# Patient Record
Sex: MALE | Race: WHITE | NOT HISPANIC OR LATINO | Employment: FULL TIME | URBAN - METROPOLITAN AREA
[De-identification: names, ages, dates, MRNs, and addresses within clinical notes are randomized per-mention and may not be internally consistent; named-entity substitution may affect disease eponyms.]

---

## 2017-05-30 ENCOUNTER — OFFICE VISIT (OUTPATIENT)
Dept: FAMILY MEDICINE | Facility: CLINIC | Age: 42
End: 2017-05-30
Payer: OTHER GOVERNMENT

## 2017-05-30 ENCOUNTER — LAB VISIT (OUTPATIENT)
Dept: LAB | Facility: HOSPITAL | Age: 42
End: 2017-05-30
Attending: FAMILY MEDICINE
Payer: OTHER GOVERNMENT

## 2017-05-30 VITALS
BODY MASS INDEX: 28.84 KG/M2 | DIASTOLIC BLOOD PRESSURE: 78 MMHG | WEIGHT: 217.63 LBS | HEIGHT: 73 IN | HEART RATE: 62 BPM | SYSTOLIC BLOOD PRESSURE: 122 MMHG | RESPIRATION RATE: 16 BRPM

## 2017-05-30 DIAGNOSIS — Z00.00 ROUTINE ADULT HEALTH MAINTENANCE: Primary | ICD-10-CM

## 2017-05-30 DIAGNOSIS — Z00.00 ROUTINE ADULT HEALTH MAINTENANCE: ICD-10-CM

## 2017-05-30 LAB
ALBUMIN SERPL BCP-MCNC: 3.8 G/DL
ALP SERPL-CCNC: 89 U/L
ALT SERPL W/O P-5'-P-CCNC: 30 U/L
ANION GAP SERPL CALC-SCNC: 9 MMOL/L
AST SERPL-CCNC: 28 U/L
BILIRUB SERPL-MCNC: 0.6 MG/DL
BUN SERPL-MCNC: 18 MG/DL
CALCIUM SERPL-MCNC: 9.1 MG/DL
CHLORIDE SERPL-SCNC: 104 MMOL/L
CHOLEST/HDLC SERPL: 4.2 {RATIO}
CO2 SERPL-SCNC: 26 MMOL/L
CREAT SERPL-MCNC: 1.2 MG/DL
EST. GFR  (AFRICAN AMERICAN): >60 ML/MIN/1.73 M^2
EST. GFR  (NON AFRICAN AMERICAN): >60 ML/MIN/1.73 M^2
GLUCOSE SERPL-MCNC: 89 MG/DL
HDL/CHOLESTEROL RATIO: 23.6 %
HDLC SERPL-MCNC: 174 MG/DL
HDLC SERPL-MCNC: 41 MG/DL
LDLC SERPL CALC-MCNC: 93.6 MG/DL
NONHDLC SERPL-MCNC: 133 MG/DL
POTASSIUM SERPL-SCNC: 4.2 MMOL/L
PROT SERPL-MCNC: 7.4 G/DL
SODIUM SERPL-SCNC: 139 MMOL/L
TRIGL SERPL-MCNC: 197 MG/DL

## 2017-05-30 PROCEDURE — 80053 COMPREHEN METABOLIC PANEL: CPT

## 2017-05-30 PROCEDURE — 99396 PREV VISIT EST AGE 40-64: CPT | Mod: S$PBB,,, | Performed by: FAMILY MEDICINE

## 2017-05-30 PROCEDURE — 36415 COLL VENOUS BLD VENIPUNCTURE: CPT | Mod: PO

## 2017-05-30 PROCEDURE — 80061 LIPID PANEL: CPT

## 2017-05-30 PROCEDURE — 99999 PR PBB SHADOW E&M-EST. PATIENT-LVL III: CPT | Mod: PBBFAC,,, | Performed by: FAMILY MEDICINE

## 2017-05-30 NOTE — PROGRESS NOTES
HPI  Hany Caba is a 41 y.o. male with multiple medical diagnoses as listed in the medical history and problem list that presents for Annual Exam (hm due: tetanus)  .      Not using CPAP, discussed dental device.      Here today for routine health maintenance.     PAST MEDICAL HISTORY:  Past Medical History:   Diagnosis Date    AR (allergic rhinitis)     KATELYNN (obstructive sleep apnea)     PPD positive, treated         PAST SURGICAL HISTORY:  Past Surgical History:   Procedure Laterality Date    APPENDECTOMY      SEPTORHINOPLASTY      ULNAR NERVE TRANSPOSITION         SOCIAL HISTORY:  Social History     Social History    Marital status:      Spouse name: N/A    Number of children: N/A    Years of education: N/A     Occupational History    Physician Assistant [employer]     Social History Main Topics    Smoking status: Never Smoker    Smokeless tobacco: Never Used    Alcohol use Yes      Comment: Occasional    Drug use: No    Sexual activity: Not on file     Other Topics Concern    Not on file     Social History Narrative    No narrative on file       FAMILY HISTORY:  Family History   Problem Relation Age of Onset    Diabetes Mother        ALLERGIES AND MEDICATIONS: updated and reviewed.  Review of patient's allergies indicates:  No Known Allergies  No current outpatient prescriptions on file.     No current facility-administered medications for this visit.        ROS  Review of Systems   Constitutional: Negative for appetite change and fatigue.   HENT: Negative for ear pain and hearing loss.    Eyes: Negative for visual disturbance.   Respiratory: Negative for cough, chest tightness and shortness of breath.    Cardiovascular: Negative for chest pain and palpitations.   Gastrointestinal: Negative for abdominal pain, blood in stool, constipation, diarrhea, nausea and vomiting.   Genitourinary: Negative for difficulty urinating.   Musculoskeletal: Negative for back  "pain, joint swelling, neck pain and neck stiffness.   Skin: Negative.    Neurological: Negative for weakness and headaches.   Psychiatric/Behavioral: Negative for dysphoric mood.       Physical Exam  Vitals:    05/30/17 0857   BP: 122/78   Pulse: 62   Resp: 16    Body mass index is 28.71 kg/m².  Weight: 98.7 kg (217 lb 9.5 oz)   Height: 6' 1" (185.4 cm)     Physical Exam   Constitutional: He is oriented to person, place, and time. He appears well-developed and well-nourished. No distress.   HENT:   Head: Normocephalic and atraumatic.   Right Ear: External ear normal.   Left Ear: External ear normal.   Eyes: Conjunctivae and EOM are normal. Pupils are equal, round, and reactive to light. No scleral icterus.   Neck: Normal range of motion. Neck supple. No JVD present. No thyromegaly present.   Cardiovascular: Normal rate, regular rhythm and intact distal pulses.  Exam reveals no gallop and no friction rub.    No murmur heard.  Pulmonary/Chest: Effort normal and breath sounds normal. He has no wheezes. He has no rales.   Abdominal: Soft. Bowel sounds are normal. He exhibits no distension and no mass. There is no tenderness.   Musculoskeletal: Normal range of motion. He exhibits no edema or tenderness.   Lymphadenopathy:     He has no cervical adenopathy.   Neurological: He is alert and oriented to person, place, and time. No cranial nerve deficit. Coordination normal.   Skin: Skin is warm and dry. No rash noted.   Psychiatric: He has a normal mood and affect.   Vitals reviewed.      Health Maintenance       Date Due Completion Date    TETANUS VACCINE 11/03/1993 ---    Influenza Vaccine 08/01/2017 10/28/2015 (Done)    Override on 10/28/2015: Done    Override on 9/1/2014: Done (through )    Lipid Panel 03/15/2021 3/15/2016          Assessment & Plan    Routine adult health maintenance  -     Comprehensive metabolic panel; Future; Expected date: 05/30/2017  -     Lipid panel; Future; Expected date: " 05/30/2017  - Health maintenance reviewed  - Diet and exercise education.        Return in about 1 year (around 5/30/2018).

## 2017-08-02 ENCOUNTER — PATIENT MESSAGE (OUTPATIENT)
Dept: FAMILY MEDICINE | Facility: CLINIC | Age: 42
End: 2017-08-02

## 2017-08-02 NOTE — TELEPHONE ENCOUNTER
Please advise, pt is wanting 90 day supply of glucosamine and chondroitin sent to mail order.  Med isnt in [facility].

## 2018-03-21 ENCOUNTER — OFFICE VISIT (OUTPATIENT)
Dept: FAMILY MEDICINE | Facility: CLINIC | Age: 43
End: 2018-03-21
Payer: OTHER GOVERNMENT

## 2018-03-21 ENCOUNTER — LAB VISIT (OUTPATIENT)
Dept: LAB | Facility: HOSPITAL | Age: 43
End: 2018-03-21
Attending: FAMILY MEDICINE
Payer: OTHER GOVERNMENT

## 2018-03-21 VITALS
SYSTOLIC BLOOD PRESSURE: 124 MMHG | WEIGHT: 216.25 LBS | HEART RATE: 54 BPM | DIASTOLIC BLOOD PRESSURE: 74 MMHG | HEIGHT: 73 IN | RESPIRATION RATE: 16 BRPM | BODY MASS INDEX: 28.66 KG/M2

## 2018-03-21 DIAGNOSIS — L82.1 SK (SEBORRHEIC KERATOSIS): ICD-10-CM

## 2018-03-21 DIAGNOSIS — Z00.00 ROUTINE ADULT HEALTH MAINTENANCE: ICD-10-CM

## 2018-03-21 DIAGNOSIS — I49.3 PVC (PREMATURE VENTRICULAR CONTRACTION): ICD-10-CM

## 2018-03-21 DIAGNOSIS — L91.8 SKIN TAG: ICD-10-CM

## 2018-03-21 DIAGNOSIS — Z00.00 ROUTINE ADULT HEALTH MAINTENANCE: Primary | ICD-10-CM

## 2018-03-21 LAB
ALBUMIN SERPL BCP-MCNC: 4.1 G/DL
ALP SERPL-CCNC: 82 U/L
ALT SERPL W/O P-5'-P-CCNC: 46 U/L
ANION GAP SERPL CALC-SCNC: 6 MMOL/L
AST SERPL-CCNC: 32 U/L
BILIRUB SERPL-MCNC: 0.6 MG/DL
BUN SERPL-MCNC: 18 MG/DL
CALCIUM SERPL-MCNC: 9.8 MG/DL
CHLORIDE SERPL-SCNC: 105 MMOL/L
CO2 SERPL-SCNC: 28 MMOL/L
CREAT SERPL-MCNC: 1.2 MG/DL
EST. GFR  (AFRICAN AMERICAN): >60 ML/MIN/1.73 M^2
EST. GFR  (NON AFRICAN AMERICAN): >60 ML/MIN/1.73 M^2
GLUCOSE SERPL-MCNC: 92 MG/DL
POTASSIUM SERPL-SCNC: 4.5 MMOL/L
PROT SERPL-MCNC: 7.6 G/DL
SODIUM SERPL-SCNC: 139 MMOL/L

## 2018-03-21 PROCEDURE — 99999 PR PBB SHADOW E&M-EST. PATIENT-LVL III: CPT | Mod: PBBFAC,,, | Performed by: FAMILY MEDICINE

## 2018-03-21 PROCEDURE — 36415 COLL VENOUS BLD VENIPUNCTURE: CPT | Mod: PO

## 2018-03-21 PROCEDURE — 99396 PREV VISIT EST AGE 40-64: CPT | Mod: 25,S$PBB,, | Performed by: FAMILY MEDICINE

## 2018-03-21 PROCEDURE — 17003 DESTRUCT PREMALG LES 2-14: CPT | Mod: PBBFAC,PO | Performed by: FAMILY MEDICINE

## 2018-03-21 PROCEDURE — 17003 DESTRUCT PREMALG LES 2-14: CPT | Mod: S$PBB,,, | Performed by: FAMILY MEDICINE

## 2018-03-21 PROCEDURE — 80053 COMPREHEN METABOLIC PANEL: CPT

## 2018-03-21 PROCEDURE — 17000 DESTRUCT PREMALG LESION: CPT | Mod: S$PBB,,, | Performed by: FAMILY MEDICINE

## 2018-03-21 PROCEDURE — 99213 OFFICE O/P EST LOW 20 MIN: CPT | Mod: PBBFAC,PO | Performed by: FAMILY MEDICINE

## 2018-03-21 PROCEDURE — 17000 DESTRUCT PREMALG LESION: CPT | Mod: PBBFAC,PO | Performed by: FAMILY MEDICINE

## 2018-03-21 NOTE — PROGRESS NOTES
HPI  Hany Caba is a 42 y.o. male with multiple medical diagnoses as listed in the medical history and problem list that presents for Annual Exam (abnormal ekg; hm due: tetanus)  .      HPI  Here today for routine health maintenance.  Recent ECG showed PVCs.  Has pending shoulder surgery for rotator cuff injury  Has recent MRI showing right knee meniscal tear.    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  Review of patient's allergies indicates:  No Known Allergies  No current outpatient prescriptions on file.     No current facility-administered medications for this visit.        ROS  Review of Systems   Constitutional: Negative for activity change and unexpected weight change.   HENT: Negative for hearing loss, rhinorrhea and trouble swallowing.    Eyes: Negative for discharge and visual disturbance.   Respiratory: Negative for chest tightness and wheezing.    Cardiovascular: Negative for chest pain and palpitations.   Gastrointestinal: Negative for  "blood in stool, constipation, diarrhea and vomiting.   Endocrine: Negative for polydipsia and polyuria.   Genitourinary: Negative for difficulty urinating, hematuria and urgency.   Musculoskeletal: Negative for arthralgias, joint swelling and neck pain.   Neurological: Negative for weakness and headaches.   Psychiatric/Behavioral: Negative for confusion and dysphoric mood.       Physical Exam  Vitals:    03/21/18 0848   BP: 124/74   Pulse: (!) 54   Resp: 16    Body mass index is 28.53 kg/m².  Weight: 98.1 kg (216 lb 4.3 oz)   Height: 6' 1" (185.4 cm)     Physical Exam   Constitutional: He is oriented to person, place, and time. He appears well-developed and well-nourished. No distress.   HENT:   Head: Normocephalic and atraumatic.   Right Ear: External ear normal.   Left Ear: External ear normal.   Eyes: Conjunctivae and EOM are normal. Pupils are equal, round, and reactive to light. No scleral icterus.   Neck: Normal range of motion. Neck supple. No JVD present. No thyromegaly present.   Cardiovascular: Normal rate, regular rhythm and intact distal pulses.  Exam reveals no gallop and no friction rub.    No murmur heard.  Pulmonary/Chest: Effort normal and breath sounds normal. He has no wheezes. He has no rales.   Abdominal: Soft. Bowel sounds are normal. He exhibits no distension and no mass. There is no tenderness.   Musculoskeletal: Normal range of motion. He exhibits no edema or tenderness.   Lymphadenopathy:     He has no cervical adenopathy.   Neurological: He is alert and oriented to person, place, and time. No cranial nerve deficit. Coordination normal.   Skin: Skin is warm and dry. No rash noted.   Seborrheic keratosis on the left neck and right thigh  Skin tag on the right eyelid   Psychiatric: He has a normal mood and affect.   Vitals reviewed.      Health Maintenance       Date Due Completion Date    TETANUS VACCINE 11/03/1993 ---    Influenza Vaccine 08/01/2017 10/28/2015 (Done)    Override on " 10/28/2015: Done    Override on 9/1/2014: Done (through )    Lipid Panel 05/30/2022 5/30/2017          Assessment & Plan    Routine adult health maintenance  -     Comprehensive metabolic panel; Future; Expected date: 03/21/2018  - Health maintenance reviewed  - Diet and exercise education.    PVC (premature ventricular contraction)  -     Holter monitor - 48 hour; Future    SK (seborrheic keratosis)  - Cryotherapy    Skin tag  -     Ambulatory referral to Ophthalmology          Follow-up in about 1 year (around 3/21/2019).

## 2018-04-12 ENCOUNTER — CLINICAL SUPPORT (OUTPATIENT)
Dept: CARDIOLOGY | Facility: CLINIC | Age: 43
End: 2018-04-12
Attending: FAMILY MEDICINE
Payer: OTHER GOVERNMENT

## 2018-04-12 DIAGNOSIS — I49.3 PVC (PREMATURE VENTRICULAR CONTRACTION): ICD-10-CM

## 2018-04-12 PROCEDURE — 93226 XTRNL ECG REC<48 HR SCAN A/R: CPT | Mod: PBBFAC,PO | Performed by: INTERNAL MEDICINE

## 2018-04-12 PROCEDURE — 93227 XTRNL ECG REC<48 HR R&I: CPT | Mod: S$PBB,,, | Performed by: INTERNAL MEDICINE

## 2018-05-10 ENCOUNTER — INITIAL CONSULT (OUTPATIENT)
Dept: OPHTHALMOLOGY | Facility: CLINIC | Age: 43
End: 2018-05-10
Payer: OTHER GOVERNMENT

## 2018-05-10 DIAGNOSIS — D23.10 PAPILLOMA OF EYELID, RIGHT: Primary | ICD-10-CM

## 2018-05-10 PROCEDURE — 99999 PR PBB SHADOW E&M-EST. PATIENT-LVL II: CPT | Mod: PBBFAC,,, | Performed by: OPHTHALMOLOGY

## 2018-05-10 PROCEDURE — 67840 REMOVE EYELID LESION: CPT | Mod: E3,S$PBB,, | Performed by: OPHTHALMOLOGY

## 2018-05-10 PROCEDURE — 92002 INTRM OPH EXAM NEW PATIENT: CPT | Mod: S$PBB,,, | Performed by: OPHTHALMOLOGY

## 2018-05-10 PROCEDURE — 99212 OFFICE O/P EST SF 10 MIN: CPT | Mod: PBBFAC,PO | Performed by: OPHTHALMOLOGY

## 2018-05-10 PROCEDURE — 67840 REMOVE EYELID LESION: CPT | Mod: E3,PBBFAC,PO | Performed by: OPHTHALMOLOGY

## 2018-05-10 RX ORDER — ERYTHROMYCIN 5 MG/G
OINTMENT OPHTHALMIC
Qty: 3.5 G | Refills: 0 | Status: SHIPPED | OUTPATIENT
Start: 2018-05-10 | End: 2018-06-26

## 2018-06-01 ENCOUNTER — PATIENT MESSAGE (OUTPATIENT)
Dept: FAMILY MEDICINE | Facility: CLINIC | Age: 43
End: 2018-06-01

## 2018-06-01 DIAGNOSIS — I49.3 PVC (PREMATURE VENTRICULAR CONTRACTION): Primary | ICD-10-CM

## 2018-06-06 ENCOUNTER — PATIENT MESSAGE (OUTPATIENT)
Dept: FAMILY MEDICINE | Facility: CLINIC | Age: 43
End: 2018-06-06

## 2018-09-13 ENCOUNTER — OFFICE VISIT (OUTPATIENT)
Dept: DERMATOLOGY | Facility: CLINIC | Age: 43
End: 2018-09-13
Payer: OTHER GOVERNMENT

## 2018-09-13 VITALS — WEIGHT: 211 LBS | BODY MASS INDEX: 27.96 KG/M2 | HEIGHT: 73 IN

## 2018-09-13 DIAGNOSIS — L81.4 LENTIGINES: ICD-10-CM

## 2018-09-13 DIAGNOSIS — L91.0 HYPERTROPHIC SCAR: ICD-10-CM

## 2018-09-13 DIAGNOSIS — L82.1 SEBORRHEIC KERATOSES: Primary | ICD-10-CM

## 2018-09-13 DIAGNOSIS — D22.9 MULTIPLE BENIGN NEVI: ICD-10-CM

## 2018-09-13 DIAGNOSIS — D18.00 ANGIOMA: ICD-10-CM

## 2018-09-13 PROCEDURE — 99212 OFFICE O/P EST SF 10 MIN: CPT | Mod: PBBFAC,PO | Performed by: DERMATOLOGY

## 2018-09-13 PROCEDURE — 99203 OFFICE O/P NEW LOW 30 MIN: CPT | Mod: S$PBB,,, | Performed by: DERMATOLOGY

## 2018-09-13 PROCEDURE — 99999 PR PBB SHADOW E&M-EST. PATIENT-LVL II: CPT | Mod: PBBFAC,,, | Performed by: DERMATOLOGY

## 2018-09-13 NOTE — PROGRESS NOTES
Subjective:       Patient ID:  Hany Caba is a 42 y.o. male who presents for   Chief Complaint   Patient presents with    Skin Check    Lesion     43 yo M presents for skin check.  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; works in ER        Review of Systems   Skin: Positive for activity-related sunscreen use and tendency to form keloidal scars (L shoulder surgery scar). Negative for daily sunscreen use and recent sunburn.   Hematologic/Lymphatic: Does not bruise/bleed easily.        Objective:    Physical Exam   Constitutional: He appears well-developed and well-nourished.   HENT:   Mouth/Throat: Lips normal.    Eyes: Lids are normal.    Neurological: He is alert and oriented to person, place, and time.   Psychiatric: He has a normal mood and affect.   Skin:   Areas Examined (abnormalities noted in diagram):   Scalp / Hair Palpated and Inspected  Head / Face Inspection Performed  Neck Inspection Performed  Chest / Axilla Inspection Performed  Abdomen Inspection Performed  Back Inspection Performed  RUE Inspected  LUE Inspection Performed  RLE Inspected  LLE Inspection Performed                   Diagram Legend     Erythematous scaling macule/papule c/w actinic keratosis       Vascular papule c/w angioma      Pigmented verrucoid papule/plaque c/w seborrheic keratosis      Yellow umbilicated papule c/w sebaceous hyperplasia      Irregularly shaped tan macule c/w lentigo     1-2 mm smooth white papules consistent with Milia      Movable subcutaneous cyst with punctum c/w epidermal inclusion cyst      Subcutaneous movable cyst c/w pilar cyst      Firm pink to brown papule c/w dermatofibroma      Pedunculated fleshy papule(s) c/w skin tag(s)      Evenly pigmented macule c/w junctional nevus     Mildly variegated pigmented, slightly  irregular-bordered macule c/w mildly atypical nevus      Flesh colored to evenly pigmented papule c/w intradermal nevus       Pink pearly papule/plaque c/w basal cell carcinoma      Erythematous hyperkeratotic cursted plaque c/w SCC      Surgical scar with no sign of skin cancer recurrence      Open and closed comedones      Inflammatory papules and pustules      Verrucoid papule consistent consistent with wart     Erythematous eczematous patches and plaques     Dystrophic onycholytic nail with subungual debris c/w onychomycosis     Umbilicated papule    Erythematous-base heme-crusted tan verrucoid plaque consistent with inflamed seborrheic keratosis     Erythematous Silvery Scaling Plaque c/w Psoriasis     See annotation      Assessment / Plan:        Seborrheic keratoses  These are benign inherited growths without a malignant potential. Reassurance given to patient. No treatment is necessary.   Discussed LN    Angioma  This is a benign vascular lesion. Reassurance given. No treatment required.     Lentigines  These are benign hyperpigmented sun induced lesions. Daily sun protection will reduce the number of new lesions    Multiple benign nevi  Reassurance that his nevi appear benign with regular and consistent pigment pattern on dermoscopy    Hypertrophic scar  Asymptomatic.  Declined ILK           Follow-up in about 1 year (around 9/13/2019).

## 2019-05-15 ENCOUNTER — PATIENT MESSAGE (OUTPATIENT)
Dept: FAMILY MEDICINE | Facility: CLINIC | Age: 44
End: 2019-05-15

## 2019-05-15 DIAGNOSIS — E78.5 HYPERLIPIDEMIA, UNSPECIFIED HYPERLIPIDEMIA TYPE: Primary | ICD-10-CM

## 2019-05-16 ENCOUNTER — LAB VISIT (OUTPATIENT)
Dept: LAB | Facility: HOSPITAL | Age: 44
End: 2019-05-16
Attending: FAMILY MEDICINE
Payer: OTHER GOVERNMENT

## 2019-05-16 DIAGNOSIS — E78.5 HYPERLIPIDEMIA, UNSPECIFIED HYPERLIPIDEMIA TYPE: ICD-10-CM

## 2019-05-16 LAB
ALBUMIN SERPL BCP-MCNC: 4 G/DL (ref 3.5–5.2)
ALP SERPL-CCNC: 101 U/L (ref 55–135)
ALT SERPL W/O P-5'-P-CCNC: 30 U/L (ref 10–44)
ANION GAP SERPL CALC-SCNC: 10 MMOL/L (ref 8–16)
AST SERPL-CCNC: 21 U/L (ref 10–40)
BILIRUB SERPL-MCNC: 0.3 MG/DL (ref 0.1–1)
BUN SERPL-MCNC: 22 MG/DL (ref 6–20)
CALCIUM SERPL-MCNC: 10 MG/DL (ref 8.7–10.5)
CHLORIDE SERPL-SCNC: 103 MMOL/L (ref 95–110)
CHOLEST SERPL-MCNC: 210 MG/DL (ref 120–199)
CHOLEST/HDLC SERPL: 3.7 {RATIO} (ref 2–5)
CO2 SERPL-SCNC: 24 MMOL/L (ref 23–29)
CREAT SERPL-MCNC: 1.1 MG/DL (ref 0.5–1.4)
EST. GFR  (AFRICAN AMERICAN): >60 ML/MIN/1.73 M^2
EST. GFR  (NON AFRICAN AMERICAN): >60 ML/MIN/1.73 M^2
GLUCOSE SERPL-MCNC: 117 MG/DL (ref 70–110)
HDLC SERPL-MCNC: 57 MG/DL (ref 40–75)
HDLC SERPL: 27.1 % (ref 20–50)
LDLC SERPL CALC-MCNC: 109.6 MG/DL (ref 63–159)
NONHDLC SERPL-MCNC: 153 MG/DL
POTASSIUM SERPL-SCNC: 4.2 MMOL/L (ref 3.5–5.1)
PROT SERPL-MCNC: 7.7 G/DL (ref 6–8.4)
SODIUM SERPL-SCNC: 137 MMOL/L (ref 136–145)
TRIGL SERPL-MCNC: 217 MG/DL (ref 30–150)

## 2019-05-16 PROCEDURE — 80053 COMPREHEN METABOLIC PANEL: CPT

## 2019-05-16 PROCEDURE — 80061 LIPID PANEL: CPT

## 2019-05-16 PROCEDURE — 36415 COLL VENOUS BLD VENIPUNCTURE: CPT | Mod: PO

## 2019-05-19 DIAGNOSIS — R73.9 HYPERGLYCEMIA: Primary | ICD-10-CM

## 2019-05-21 ENCOUNTER — LAB VISIT (OUTPATIENT)
Dept: LAB | Facility: HOSPITAL | Age: 44
End: 2019-05-21
Attending: FAMILY MEDICINE
Payer: OTHER GOVERNMENT

## 2019-05-21 DIAGNOSIS — R73.9 HYPERGLYCEMIA: ICD-10-CM

## 2019-05-21 LAB
ESTIMATED AVG GLUCOSE: 108 MG/DL (ref 68–131)
HBA1C MFR BLD HPLC: 5.4 % (ref 4–5.6)

## 2019-05-21 PROCEDURE — 83036 HEMOGLOBIN GLYCOSYLATED A1C: CPT

## 2019-05-21 PROCEDURE — 36415 COLL VENOUS BLD VENIPUNCTURE: CPT | Mod: PO

## 2019-06-24 ENCOUNTER — OFFICE VISIT (OUTPATIENT)
Dept: DERMATOLOGY | Facility: CLINIC | Age: 44
End: 2019-06-24
Payer: OTHER GOVERNMENT

## 2019-06-24 VITALS — HEIGHT: 73 IN | WEIGHT: 211 LBS | BODY MASS INDEX: 27.96 KG/M2

## 2019-06-24 DIAGNOSIS — L91.0 HYPERTROPHIC SCAR: ICD-10-CM

## 2019-06-24 DIAGNOSIS — L81.4 LENTIGINES: ICD-10-CM

## 2019-06-24 DIAGNOSIS — L82.1 SEBORRHEIC KERATOSES: Primary | ICD-10-CM

## 2019-06-24 DIAGNOSIS — D22.9 MULTIPLE BENIGN NEVI: ICD-10-CM

## 2019-06-24 PROCEDURE — 99213 OFFICE O/P EST LOW 20 MIN: CPT | Mod: S$PBB,,, | Performed by: DERMATOLOGY

## 2019-06-24 PROCEDURE — 99999 PR PBB SHADOW E&M-EST. PATIENT-LVL III: ICD-10-PCS | Mod: PBBFAC,,, | Performed by: DERMATOLOGY

## 2019-06-24 PROCEDURE — 99213 OFFICE O/P EST LOW 20 MIN: CPT | Mod: PBBFAC,PO | Performed by: DERMATOLOGY

## 2019-06-24 PROCEDURE — 99999 PR PBB SHADOW E&M-EST. PATIENT-LVL III: CPT | Mod: PBBFAC,,, | Performed by: DERMATOLOGY

## 2019-06-24 PROCEDURE — 99213 PR OFFICE/OUTPT VISIT, EST, LEVL III, 20-29 MIN: ICD-10-PCS | Mod: S$PBB,,, | Performed by: DERMATOLOGY

## 2019-06-24 NOTE — PROGRESS NOTES
Subjective:       Patient ID:  Hany Caba is a 43 y.o. male who presents for   Chief Complaint   Patient presents with    Lesion     Last OV 09/03/2018    44 y/o M present with sons for evaluation of a lesion on his back x few weeks,irritated. His wife noticed it.  No prior tx. 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  Past Medical History:   Diagnosis Date    AR (allergic rhinitis)     KATELYNN (obstructive sleep apnea)     PPD positive, treated     Skin tag     Right upper eyelid     Review of Systems   Skin: Positive for itching, activity-related sunscreen use, tendency to form keloidal scars (L shoulder surgery scar) and wears hat. Negative for dry skin, daily sunscreen use and recent sunburn.   Hematologic/Lymphatic: Does not bruise/bleed easily.        Objective:    Physical Exam   Constitutional: He appears well-developed and well-nourished.   Neurological: He is alert and oriented to person, place, and time.   Psychiatric: He has a normal mood and affect.   Skin:   Areas Examined (abnormalities noted in diagram):   Head / Face Inspection Performed  Neck Inspection Performed  Chest / Axilla Inspection Performed  Abdomen Inspection Performed  RLE Inspected              Diagram Legend     Erythematous scaling macule/papule c/w actinic keratosis       Vascular papule c/w angioma      Pigmented verrucoid papule/plaque c/w seborrheic keratosis      Yellow umbilicated papule c/w sebaceous hyperplasia      Irregularly shaped tan macule c/w lentigo     1-2 mm smooth white papules consistent with Milia      Movable subcutaneous cyst with punctum c/w epidermal inclusion cyst      Subcutaneous movable cyst c/w pilar cyst      Firm pink to brown papule c/w dermatofibroma      Pedunculated fleshy papule(s) c/w skin tag(s)      Evenly pigmented macule c/w junctional nevus     Mildly variegated pigmented, slightly  irregular-bordered macule c/w mildly atypical nevus      Flesh colored to evenly pigmented papule c/w intradermal nevus       Pink pearly papule/plaque c/w basal cell carcinoma      Erythematous hyperkeratotic cursted plaque c/w SCC      Surgical scar with no sign of skin cancer recurrence      Open and closed comedones      Inflammatory papules and pustules      Verrucoid papule consistent consistent with wart     Erythematous eczematous patches and plaques     Dystrophic onycholytic nail with subungual debris c/w onychomycosis     Umbilicated papule    Erythematous-base heme-crusted tan verrucoid plaque consistent with inflamed seborrheic keratosis     Erythematous Silvery Scaling Plaque c/w Psoriasis     See annotation      Assessment / Plan:        Seborrheic keratoses  These are benign inherited growths without a malignant potential. Reassurance given to patient. No treatment is necessary.   Discussed LN to lesions on R thigh and mid back    Multiple benign nevi  Reassurance that his nevi appear benign with regular and consistent pigment pattern on dermoscopy    Lentigines  These are benign hyperpigmented sun induced lesions. Daily sun protection will reduce the number of new lesions    Hypertrophic scar  Declined ILK at last OV           Follow up if symptoms worsen or fail to improve.

## 2019-07-24 ENCOUNTER — PATIENT MESSAGE (OUTPATIENT)
Dept: FAMILY MEDICINE | Facility: CLINIC | Age: 44
End: 2019-07-24

## 2019-09-30 ENCOUNTER — PATIENT MESSAGE (OUTPATIENT)
Dept: FAMILY MEDICINE | Facility: CLINIC | Age: 44
End: 2019-09-30

## 2019-09-30 ENCOUNTER — OFFICE VISIT (OUTPATIENT)
Dept: FAMILY MEDICINE | Facility: CLINIC | Age: 44
End: 2019-09-30
Payer: OTHER GOVERNMENT

## 2019-09-30 ENCOUNTER — HOSPITAL ENCOUNTER (OUTPATIENT)
Dept: RADIOLOGY | Facility: HOSPITAL | Age: 44
Discharge: HOME | End: 2019-09-30
Attending: FAMILY MEDICINE
Payer: OTHER GOVERNMENT

## 2019-09-30 VITALS
SYSTOLIC BLOOD PRESSURE: 98 MMHG | HEIGHT: 73 IN | HEART RATE: 70 BPM | WEIGHT: 213.38 LBS | BODY MASS INDEX: 28.28 KG/M2 | TEMPERATURE: 98 F | OXYGEN SATURATION: 98 % | DIASTOLIC BLOOD PRESSURE: 74 MMHG

## 2019-09-30 DIAGNOSIS — M25.551 PAIN OF BOTH HIP JOINTS: ICD-10-CM

## 2019-09-30 DIAGNOSIS — Z30.09 VASECTOMY EVALUATION: ICD-10-CM

## 2019-09-30 DIAGNOSIS — M25.552 PAIN OF BOTH HIP JOINTS: ICD-10-CM

## 2019-09-30 DIAGNOSIS — Z00.00 ROUTINE HEALTH MAINTENANCE: Primary | ICD-10-CM

## 2019-09-30 PROCEDURE — 99999 PR PBB SHADOW E&M-EST. PATIENT-LVL IV: CPT | Mod: PBBFAC,,, | Performed by: FAMILY MEDICINE

## 2019-09-30 PROCEDURE — 73522 X-RAY EXAM HIPS BI 3-4 VIEWS: CPT | Mod: 50,TC,FY,PO

## 2019-09-30 PROCEDURE — 99396 PR PREVENTIVE VISIT,EST,40-64: ICD-10-PCS | Mod: S$PBB,,, | Performed by: FAMILY MEDICINE

## 2019-09-30 PROCEDURE — 73522 XR HIP 3 OR 4 VIEWS BILATERAL: ICD-10-PCS | Mod: 26,,, | Performed by: RADIOLOGY

## 2019-09-30 PROCEDURE — 99396 PREV VISIT EST AGE 40-64: CPT | Mod: S$PBB,,, | Performed by: FAMILY MEDICINE

## 2019-09-30 PROCEDURE — 99214 OFFICE O/P EST MOD 30 MIN: CPT | Mod: PBBFAC,PO,25 | Performed by: FAMILY MEDICINE

## 2019-09-30 PROCEDURE — 73522 X-RAY EXAM HIPS BI 3-4 VIEWS: CPT | Mod: 26,,, | Performed by: RADIOLOGY

## 2019-09-30 PROCEDURE — 90471 IMMUNIZATION ADMIN: CPT | Mod: PBBFAC,PO

## 2019-09-30 PROCEDURE — 99999 PR PBB SHADOW E&M-EST. PATIENT-LVL IV: ICD-10-PCS | Mod: PBBFAC,,, | Performed by: FAMILY MEDICINE

## 2019-09-30 RX ORDER — PANTOPRAZOLE SODIUM 40 MG/1
40 TABLET, DELAYED RELEASE ORAL DAILY
Qty: 90 TABLET | Refills: 3 | Status: SHIPPED | OUTPATIENT
Start: 2019-09-30 | End: 2020-07-22

## 2019-09-30 NOTE — PROGRESS NOTES
HPI  Hany Caba is a 43 y.o. male with multiple medical diagnoses as listed in the medical history and problem list that presents for Annual Exam; Hip Pain (left; noticed with certain movements); and Flu Vaccine  .      HPI  Here today for routine health maintenance.    PAST MEDICAL HISTORY:  Past Medical History:   Diagnosis Date    AR (allergic rhinitis)     KATELYNN (obstructive sleep apnea)     PPD positive, treated     Skin tag     Right upper eyelid       PAST SURGICAL HISTORY:  Past Surgical History:   Procedure Laterality Date    APPENDECTOMY      ROTATOR CUFF REPAIR Right     SEPTORHINOPLASTY      ULNAR NERVE TRANSPOSITION         SOCIAL HISTORY:  Social History     Socioeconomic History    Marital status:      Spouse name: Lauryn    Number of children: 3    Years of education: 6    Highest education level: Not on file   Occupational History    Occupation: Physician Assistant     Employer: [employer]     Comment: [employer]   Social Needs    Financial resource strain: Not very hard    Food insecurity:     Worry: Never true     Inability: Never true    Transportation needs:     Medical: No     Non-medical: No   Tobacco Use    Smoking status: Never Smoker    Smokeless tobacco: Never Used   Substance and Sexual Activity    Alcohol use: Yes     Frequency: Monthly or less     Drinks per session: 1 or 2     Binge frequency: Never     Comment: Occasional    Drug use: No    Sexual activity: Not on file   Lifestyle    Physical activity:     Days per week: 5 days     Minutes per session: Not on file    Stress: To some extent   Relationships    Social connections:     Talks on phone: Twice a week     Gets together: Never     Attends Hoahaoism service: Not on file     Active member of club or organization: No     Attends meetings of clubs or organizations: Never     Relationship status:    Other Topics Concern    Not on file   Social History Narrative    Not on file  "      FAMILY HISTORY:  Family History   Problem Relation Age of Onset    Diabetes Mother     Cancer Maternal Grandmother         breast    Macular degeneration Maternal Grandmother     Amblyopia Neg Hx     Blindness Neg Hx     Cataracts Neg Hx     Glaucoma Neg Hx     Hypertension Neg Hx     Retinal detachment Neg Hx     Strabismus Neg Hx     Stroke Neg Hx     Thyroid disease Neg Hx        ALLERGIES AND MEDICATIONS: updated and reviewed.  Review of patient's allergies indicates:  No Known Allergies  Current Outpatient Medications   Medication Sig Dispense Refill    meloxicam (MOBIC) 15 MG tablet Take 15 mg by mouth once daily.       No current facility-administered medications for this visit.        ROS  Review of Systems   Constitutional: Negative for activity change and unexpected weight change.   HENT: Negative for hearing loss, rhinorrhea and trouble swallowing.    Eyes: Negative for discharge and visual disturbance.   Respiratory: Negative for chest tightness and wheezing.    Cardiovascular: Negative for chest pain and palpitations.   Gastrointestinal: Negative for blood in stool, constipation, diarrhea and vomiting.   Endocrine: Negative for polydipsia and polyuria.   Genitourinary: Negative for difficulty urinating, hematuria and urgency.   Musculoskeletal: Positive for arthralgias (hip). Negative for joint swelling and neck pain.   Neurological: Negative for weakness and headaches.   Psychiatric/Behavioral: Negative for confusion and dysphoric mood.       Physical Exam  Vitals:    09/30/19 0943   BP: 98/74   Pulse: 70   Temp: 98 °F (36.7 °C)    Body mass index is 28.16 kg/m².  Weight: 96.8 kg (213 lb 6.5 oz)   Height: 6' 1" (185.4 cm)     Physical Exam   Constitutional: He is oriented to person, place, and time. He appears well-developed and well-nourished. No distress.   HENT:   Head: Normocephalic and atraumatic.   Right Ear: External ear normal.   Left Ear: External ear normal.   Eyes: Pupils " are equal, round, and reactive to light. Conjunctivae and EOM are normal. No scleral icterus.   Neck: Normal range of motion. Neck supple. No JVD present. No thyromegaly present.   Cardiovascular: Normal rate, regular rhythm and intact distal pulses. Exam reveals no gallop and no friction rub.   No murmur heard.  Pulmonary/Chest: Effort normal and breath sounds normal. He has no wheezes. He has no rales.   Abdominal: Soft. Bowel sounds are normal. He exhibits no distension and no mass. There is no tenderness.   Musculoskeletal: Normal range of motion. He exhibits no edema or tenderness.   Lymphadenopathy:     He has no cervical adenopathy.   Neurological: He is alert and oriented to person, place, and time. No cranial nerve deficit. Coordination normal.   Skin: Skin is warm and dry. No rash noted.   Psychiatric: He has a normal mood and affect.   Vitals reviewed.    Results for orders placed or performed in visit on 05/21/19   Hemoglobin A1c   Result Value Ref Range    Hemoglobin A1C 5.4 4.0 - 5.6 %    Estimated Avg Glucose 108 68 - 131 mg/dL         Health Maintenance       Date Due Completion Date    TETANUS VACCINE 11/03/1993 ---    Influenza Vaccine (1) 09/01/2019 10/3/2018    Lipid Panel 05/16/2024 5/16/2019          Assessment & Plan    Routine health maintenance  -     Ambulatory consult to Optometry  - Health maintenance reviewed  - Diet and exercise education.    Pain of both hip joints  -     X-Ray Pelvis 3 View inc Hip 2 view Bilat; Future; Expected date: 09/30/2019    Vasectomy evaluation  -     Ambulatory consult to Urology        Follow up in about 1 year (around 9/30/2020).

## 2019-10-22 ENCOUNTER — PATIENT MESSAGE (OUTPATIENT)
Dept: FAMILY MEDICINE | Facility: CLINIC | Age: 44
End: 2019-10-22

## 2019-11-05 ENCOUNTER — PATIENT MESSAGE (OUTPATIENT)
Dept: FAMILY MEDICINE | Facility: CLINIC | Age: 44
End: 2019-11-05

## 2019-12-19 ENCOUNTER — OFFICE VISIT (OUTPATIENT)
Dept: DERMATOLOGY | Facility: CLINIC | Age: 44
End: 2019-12-19
Payer: OTHER GOVERNMENT

## 2019-12-19 VITALS — BODY MASS INDEX: 28.28 KG/M2 | HEIGHT: 73 IN | WEIGHT: 213.38 LBS | RESPIRATION RATE: 18 BRPM

## 2019-12-19 DIAGNOSIS — B07.8 COMMON WART: Primary | ICD-10-CM

## 2019-12-19 PROCEDURE — 99999 PR PBB SHADOW E&M-EST. PATIENT-LVL III: ICD-10-PCS | Mod: PBBFAC,,, | Performed by: DERMATOLOGY

## 2019-12-19 PROCEDURE — 99999 PR PBB SHADOW E&M-EST. PATIENT-LVL III: CPT | Mod: PBBFAC,,, | Performed by: DERMATOLOGY

## 2019-12-19 PROCEDURE — 17110 DESTRUCTION B9 LES UP TO 14: CPT | Mod: S$PBB,,, | Performed by: DERMATOLOGY

## 2019-12-19 PROCEDURE — 17110 PR DESTRUCTION BENIGN LESIONS UP TO 14: ICD-10-PCS | Mod: S$PBB,,, | Performed by: DERMATOLOGY

## 2019-12-19 PROCEDURE — 99213 OFFICE O/P EST LOW 20 MIN: CPT | Mod: PBBFAC,PO | Performed by: DERMATOLOGY

## 2019-12-19 PROCEDURE — 99499 UNLISTED E&M SERVICE: CPT | Mod: S$PBB,,, | Performed by: DERMATOLOGY

## 2019-12-19 PROCEDURE — 17110 DESTRUCTION B9 LES UP TO 14: CPT | Mod: PBBFAC,PO | Performed by: DERMATOLOGY

## 2019-12-19 PROCEDURE — 99499 NO LOS: ICD-10-PCS | Mod: S$PBB,,, | Performed by: DERMATOLOGY

## 2019-12-19 NOTE — PROGRESS NOTES
Subjective:       Patient ID:  Hany Caba is a 44 y.o. male who presents for   Chief Complaint   Patient presents with    Follow-up     44 y.o. M presents for a possible nail fungal infection on his L middle finger that has been present for years. Started as nail thickening in the corner and here recently started cracking and is painful.     Patient hasn't tried any OTC medications.  Patient was last seen on 06-.     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 Negative for dry skin, daily sunscreen use and recent sunburn.   Hematologic/Lymphatic: Does not bruise/bleed easily.       Past Medical History:   Diagnosis Date    AR (allergic rhinitis)     KATELYNN (obstructive sleep apnea)     PPD positive, treated     Skin tag     Right upper eyelid     Objective:    Physical Exam   Constitutional: He appears well-developed and well-nourished.   HENT:   Mouth/Throat: Lips normal.    Eyes: Lids are normal.    Neurological: He is alert and oriented to person, place, and time.   Psychiatric: He has a normal mood and affect.   Skin:   Areas Examined (abnormalities noted in diagram):   LUE Inspection Performed             Diagram Legend       Verrucoid papule consistent consistent with wart       Assessment / Plan:        Common wart    Cryosurgery procedure note:    Verbal consent from the patient is obtained. Liquid nitrogen cryosurgery is applied to 1 verruca, as detailed in the physical exam, to produce a freeze injury. 3 consecutive freeze thaw cycles are applied to each verruca. The patient is aware that blisters (possibly blood blisters) may form.         Pt will send picture and msg in 2 weeks at which time will determine which topical treatment is best    Follow up for Pt will send msg via [facility].

## 2020-01-02 ENCOUNTER — PATIENT MESSAGE (OUTPATIENT)
Dept: DERMATOLOGY | Facility: CLINIC | Age: 45
End: 2020-01-02

## 2020-01-11 ENCOUNTER — PATIENT MESSAGE (OUTPATIENT)
Dept: DERMATOLOGY | Facility: CLINIC | Age: 45
End: 2020-01-11

## 2020-02-03 ENCOUNTER — PATIENT OUTREACH (OUTPATIENT)
Dept: ADMINISTRATIVE | Facility: OTHER | Age: 45
End: 2020-02-03

## 2020-02-04 ENCOUNTER — OFFICE VISIT (OUTPATIENT)
Dept: DERMATOLOGY | Facility: CLINIC | Age: 45
End: 2020-02-04
Payer: OTHER GOVERNMENT

## 2020-02-04 VITALS — BODY MASS INDEX: 28.28 KG/M2 | RESPIRATION RATE: 18 BRPM | WEIGHT: 213.38 LBS | HEIGHT: 73 IN

## 2020-02-04 DIAGNOSIS — B07.8 COMMON WART: Primary | ICD-10-CM

## 2020-02-04 PROBLEM — I49.3 PVC (PREMATURE VENTRICULAR CONTRACTION): Status: ACTIVE | Noted: 2020-02-04

## 2020-02-04 PROCEDURE — 17110 PR DESTRUCTION BENIGN LESIONS UP TO 14: ICD-10-PCS | Mod: S$PBB,,, | Performed by: DERMATOLOGY

## 2020-02-04 PROCEDURE — 17110 DESTRUCTION B9 LES UP TO 14: CPT | Mod: PBBFAC,PO | Performed by: DERMATOLOGY

## 2020-02-04 PROCEDURE — 99999 PR PBB SHADOW E&M-EST. PATIENT-LVL III: ICD-10-PCS | Mod: PBBFAC,,, | Performed by: DERMATOLOGY

## 2020-02-04 PROCEDURE — 99499 NO LOS: ICD-10-PCS | Mod: S$PBB,,, | Performed by: DERMATOLOGY

## 2020-02-04 PROCEDURE — 99999 PR PBB SHADOW E&M-EST. PATIENT-LVL III: CPT | Mod: PBBFAC,,, | Performed by: DERMATOLOGY

## 2020-02-04 PROCEDURE — 17110 DESTRUCTION B9 LES UP TO 14: CPT | Mod: S$PBB,,, | Performed by: DERMATOLOGY

## 2020-02-04 PROCEDURE — 99499 UNLISTED E&M SERVICE: CPT | Mod: S$PBB,,, | Performed by: DERMATOLOGY

## 2020-02-04 PROCEDURE — 99213 OFFICE O/P EST LOW 20 MIN: CPT | Mod: PBBFAC,PO | Performed by: DERMATOLOGY

## 2020-02-04 NOTE — PROGRESS NOTES
Subjective:       Patient ID:  Hany Caba is a 44 y.o. male who presents for   Chief Complaint   Patient presents with    Warts     43 yo M presents for wart f/u.  He has a wart on L middle finger. Treated with cryotherapy at last visit 12/19/19.  He thinks the lesion is getting smaller with therapy.    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        Objective:    Physical Exam   Constitutional: He appears well-developed and well-nourished.   HENT:   Mouth/Throat: Lips normal.    Eyes: Lids are normal.    Neurological: He is alert and oriented to person, place, and time.   Psychiatric: He has a normal mood and affect.   Skin:   Areas Examined (abnormalities noted in diagram):   LUE Inspection Performed             Diagram Legend       Verrucoid papule consistent consistent with wart      Assessment / Plan:             Common Wart  Cryosurgery procedure note:    Verbal consent from the patient is obtained. Liquid nitrogen cryosurgery is applied to 1 verruca, as detailed in the physical exam, to produce a freeze injury. 3 consecutive freeze thaw cycles are applied to each verruca. The patient is aware that blisters (possibly blood blisters) may form    Pt will send photo

## 2020-02-05 ENCOUNTER — HOSPITAL ENCOUNTER (OUTPATIENT)
Dept: RADIOLOGY | Facility: HOSPITAL | Age: 45
Discharge: HOME | End: 2020-02-05
Attending: ORTHOPAEDIC SURGERY
Payer: OTHER GOVERNMENT

## 2020-02-05 DIAGNOSIS — M79.662 PAIN OF LEFT CALF: ICD-10-CM

## 2020-02-05 PROCEDURE — 93971 EXTREMITY STUDY: CPT | Mod: 26,LT,, | Performed by: RADIOLOGY

## 2020-02-05 PROCEDURE — 93971 EXTREMITY STUDY: CPT | Mod: TC,PO,LT

## 2020-02-05 PROCEDURE — 93971 US LOWER EXTREMITY VEINS LEFT: ICD-10-PCS | Mod: 26,LT,, | Performed by: RADIOLOGY

## 2020-02-20 ENCOUNTER — PATIENT MESSAGE (OUTPATIENT)
Dept: DERMATOLOGY | Facility: CLINIC | Age: 45
End: 2020-02-20

## 2020-02-20 NOTE — TELEPHONE ENCOUNTER
2 week F/U post last cryo L 3rd finger.     Similar thickening I have noted to my right 2nd finger as well.     We're did we leave the conversation on orals? Who knows if I spread it with the clippers over all those years.     Thoughts?          Attachments     L 3rd finger p 2nd cryo   Responsible Party

## 2020-03-24 ENCOUNTER — CLINICAL SUPPORT (OUTPATIENT)
Dept: URGENT CARE | Facility: CLINIC | Age: 45
End: 2020-03-24
Payer: OTHER GOVERNMENT

## 2020-03-24 DIAGNOSIS — R05.9 COUGH: Primary | ICD-10-CM

## 2020-03-24 PROCEDURE — U0002 COVID-19 LAB TEST NON-CDC: HCPCS

## 2020-03-25 LAB — SARS-COV-2 RNA RESP QL NAA+PROBE: NOT DETECTED

## 2020-03-31 ENCOUNTER — TELEPHONE (OUTPATIENT)
Dept: OPTOMETRY | Facility: CLINIC | Age: 45
End: 2020-03-31

## 2020-04-23 DIAGNOSIS — Z01.84 ANTIBODY RESPONSE EXAMINATION: ICD-10-CM

## 2020-04-27 ENCOUNTER — LAB VISIT (OUTPATIENT)
Dept: LAB | Facility: HOSPITAL | Age: 45
End: 2020-04-27
Attending: INTERNAL MEDICINE
Payer: OTHER GOVERNMENT

## 2020-04-27 DIAGNOSIS — Z01.84 ANTIBODY RESPONSE EXAMINATION: ICD-10-CM

## 2020-04-27 LAB — SARS-COV-2 IGG SERPL QL IA: NEGATIVE

## 2020-04-27 PROCEDURE — 86769 SARS-COV-2 COVID-19 ANTIBODY: CPT

## 2020-04-27 PROCEDURE — 36415 COLL VENOUS BLD VENIPUNCTURE: CPT

## 2020-05-13 ENCOUNTER — HOSPITAL ENCOUNTER (OUTPATIENT)
Dept: RADIOLOGY | Facility: HOSPITAL | Age: 45
Discharge: HOME | End: 2020-05-13
Attending: FAMILY MEDICINE
Payer: OTHER GOVERNMENT

## 2020-05-13 ENCOUNTER — PATIENT MESSAGE (OUTPATIENT)
Dept: FAMILY MEDICINE | Facility: CLINIC | Age: 45
End: 2020-05-13

## 2020-05-13 ENCOUNTER — OFFICE VISIT (OUTPATIENT)
Dept: FAMILY MEDICINE | Facility: CLINIC | Age: 45
End: 2020-05-13
Payer: OTHER GOVERNMENT

## 2020-05-13 VITALS
HEART RATE: 78 BPM | BODY MASS INDEX: 30.09 KG/M2 | HEIGHT: 73 IN | WEIGHT: 227.06 LBS | DIASTOLIC BLOOD PRESSURE: 70 MMHG | TEMPERATURE: 99 F | OXYGEN SATURATION: 97 % | SYSTOLIC BLOOD PRESSURE: 124 MMHG

## 2020-05-13 DIAGNOSIS — K21.9 GASTROESOPHAGEAL REFLUX DISEASE WITHOUT ESOPHAGITIS: ICD-10-CM

## 2020-05-13 DIAGNOSIS — R06.02 SHORTNESS OF BREATH: ICD-10-CM

## 2020-05-13 DIAGNOSIS — Z00.01 ENCOUNTER FOR ROUTINE ADULT HEALTH EXAMINATION WITH ABNORMAL FINDINGS: Primary | ICD-10-CM

## 2020-05-13 DIAGNOSIS — I49.8 BIGEMINY: ICD-10-CM

## 2020-05-13 DIAGNOSIS — R76.11 PPD POSITIVE, TREATED: ICD-10-CM

## 2020-05-13 DIAGNOSIS — M54.50 CHRONIC MIDLINE LOW BACK PAIN WITHOUT SCIATICA: ICD-10-CM

## 2020-05-13 DIAGNOSIS — R53.83 OTHER FATIGUE: ICD-10-CM

## 2020-05-13 DIAGNOSIS — G89.29 CHRONIC MIDLINE LOW BACK PAIN WITHOUT SCIATICA: ICD-10-CM

## 2020-05-13 DIAGNOSIS — R73.09 ABNORMAL GLUCOSE: ICD-10-CM

## 2020-05-13 PROCEDURE — 99396 PR PREVENTIVE VISIT,EST,40-64: ICD-10-PCS | Mod: S$PBB,,, | Performed by: FAMILY MEDICINE

## 2020-05-13 PROCEDURE — 72100 XR LUMBAR SPINE AP AND LATERAL: ICD-10-PCS | Mod: 26,,, | Performed by: RADIOLOGY

## 2020-05-13 PROCEDURE — 99999 PR PBB SHADOW E&M-EST. PATIENT-LVL IV: ICD-10-PCS | Mod: PBBFAC,,, | Performed by: FAMILY MEDICINE

## 2020-05-13 PROCEDURE — 71046 X-RAY EXAM CHEST 2 VIEWS: CPT | Mod: TC,FY,PO

## 2020-05-13 PROCEDURE — 71046 X-RAY EXAM CHEST 2 VIEWS: CPT | Mod: 26,,, | Performed by: RADIOLOGY

## 2020-05-13 PROCEDURE — 99396 PREV VISIT EST AGE 40-64: CPT | Mod: S$PBB,,, | Performed by: FAMILY MEDICINE

## 2020-05-13 PROCEDURE — 71046 XR CHEST PA AND LATERAL: ICD-10-PCS | Mod: 26,,, | Performed by: RADIOLOGY

## 2020-05-13 PROCEDURE — 72100 X-RAY EXAM L-S SPINE 2/3 VWS: CPT | Mod: TC,FY,PO

## 2020-05-13 PROCEDURE — 99999 PR PBB SHADOW E&M-EST. PATIENT-LVL IV: CPT | Mod: PBBFAC,,, | Performed by: FAMILY MEDICINE

## 2020-05-13 PROCEDURE — 72100 X-RAY EXAM L-S SPINE 2/3 VWS: CPT | Mod: 26,,, | Performed by: RADIOLOGY

## 2020-05-13 PROCEDURE — 99214 OFFICE O/P EST MOD 30 MIN: CPT | Mod: PBBFAC,25,PO | Performed by: FAMILY MEDICINE

## 2020-05-14 DIAGNOSIS — G89.29 CHRONIC MIDLINE LOW BACK PAIN WITHOUT SCIATICA: Primary | ICD-10-CM

## 2020-05-14 DIAGNOSIS — M54.50 CHRONIC MIDLINE LOW BACK PAIN WITHOUT SCIATICA: Primary | ICD-10-CM

## 2020-05-14 NOTE — PROGRESS NOTES
Subjective:       Patient ID: Hany Caba is a 44 y.o. male.    Chief Complaint:  Annual    HPI     The patient is coming here today to establish a new primary care physician and for an annual checkup.  Fatigued, gaining weight, also pain on the lower back without radiation for months, the symptoms are getting worse.  The patient is a PA for orthopedics.  The patient also was diagnosed with bigeminy, he was placed on verapamil secondary to this problem but the patient stated that even with the medication he is having some shortness of breath which is mild.  He had a abnormal PPD that requires x-rays, the last few x-rays that were done were normal.  The patient denies any symptoms of chest pain, but he is complaining of palpitations.    Past medical history, past social history was reviewed and discussed with the patient.    Review of Systems   Constitutional: Positive for activity change, fatigue and unexpected weight change.   HENT: Negative for hearing loss, rhinorrhea and trouble swallowing.    Eyes: Positive for visual disturbance. Negative for discharge.   Respiratory: Positive for shortness of breath. Negative for chest tightness and wheezing.    Cardiovascular: Positive for palpitations. Negative for chest pain.   Gastrointestinal: Negative for blood in stool, constipation, diarrhea and vomiting.   Endocrine: Negative for polydipsia and polyuria.   Genitourinary: Negative for difficulty urinating, hematuria and urgency.   Musculoskeletal: Positive for arthralgias and back pain. Negative for joint swelling and neck pain.   Neurological: Negative for weakness and headaches.   Psychiatric/Behavioral: Negative for confusion and dysphoric mood.       Objective:      Physical Exam   Constitutional: He appears well-developed and well-nourished. No distress.   HENT:   Head: Normocephalic and atraumatic.   Right Ear: External ear normal.   Left Ear: External ear normal.   Nose: Nose normal.   Mouth/Throat: No  oropharyngeal exudate.   Eyes: Pupils are equal, round, and reactive to light. Right eye exhibits no discharge. Left eye exhibits no discharge. No scleral icterus.   Neck: Normal range of motion. Neck supple. No thyromegaly present.   Cardiovascular: Normal rate, regular rhythm and normal heart sounds. Exam reveals no friction rub.   No murmur heard.  Pulmonary/Chest: Effort normal and breath sounds normal. No respiratory distress. He has no wheezes.   Abdominal: Soft. Bowel sounds are normal. There is no tenderness. There is no guarding.   Musculoskeletal: He exhibits tenderness (Lumbar spine). He exhibits no edema.   Neurological: No cranial nerve deficit. Coordination normal.   Skin: Skin is warm and dry. No rash noted. He is not diaphoretic. No erythema. No pallor.   Psychiatric: He has a normal mood and affect. His behavior is normal. Judgment and thought content normal.   Nursing note and vitals reviewed.      Assessment:       1. Encounter for routine adult health examination with abnormal findings    2. Bigeminy    3. Abnormal glucose    4. Other fatigue    5. Gastroesophageal reflux disease without esophagitis    6. Shortness of breath    7. Chronic midline low back pain without sciatica    8. PPD positive, treated        Plan:       Encounter for routine adult health examination with abnormal findings  -     Comprehensive metabolic panel; Future; Expected date: 05/13/2020  -     Lipid Panel; Future; Expected date: 05/13/2020  -     CBC auto differential; Future; Expected date: 05/13/2020  -     TSH; Future; Expected date: 05/13/2020  -     Hemoglobin A1C; Future; Expected date: 05/13/2020  -     Testosterone; Future; Expected date: 05/13/2020  -     HIV 1/2 Ag/Ab (4th Gen); Future; Expected date: 05/13/2020    Bigeminy:  New problem, next visit workup    Abnormal glucose:  New problem workup needed  -     Hemoglobin A1C; Future; Expected date: 05/13/2020  -     INSULIN, RANDOM; Future; Expected date:  05/13/2020    Other fatigue:  New problem workup needed  -     Comprehensive metabolic panel; Future; Expected date: 05/13/2020  -     CBC auto differential; Future; Expected date: 05/13/2020  -     TSH; Future; Expected date: 05/13/2020  -     Testosterone; Future; Expected date: 05/13/2020    Gastroesophageal reflux disease without esophagitis:  New problem, no further workup needed    Shortness of breath:  New problem workup needed  -     Brain Natriuretic Peptide; Future; Expected date: 05/13/2020    Chronic midline low back pain without sciatica:  New problem workup needed  -     X-Ray Lumbar Spine AP And Lateral; Future; Expected date: 05/13/2020    PPD positive, treated:  New problem workup needed  -     X-Ray Chest PA And Lateral; Future; Expected date: 05/13/2020    Will call the patient after we have the results of the test, healthy habits, avoid fried foods, red meat and processed starches.  The patient's BMI has been recorded in the chart. The patient has been provided educational materials regarding the benefits of attaining and maintaining a normal weight. We will continue to address and follow this issue during follow up visits.   Patient agreed with assessment and plan. Patient verbalized understanding.

## 2020-05-14 NOTE — PATIENT INSTRUCTIONS
Eating Heart-Healthy Food: Using the DASH Plan    Eating for your heart doesnt have to be hard or boring. You just need to know how to make healthier choices. The DASH eating plan has been developed to help you do just that. DASH stands for Dietary Approaches to Stop Hypertension. It is a plan that has been proven to be healthier for your heart and to lower your risk for high blood pressure. It can also help lower your risk for cancer, heart disease, osteoporosis, and diabetes.  Choosing from each food group  Choose foods from each of the food groups below each day. Try to get the recommended number of servings for each food group. The serving numbers are based on a diet of 2,000 calories a day. Talk to your doctor if youre unsure about your calorie needs. Along with getting the correct servings, the DASH plan also recommends a sodium intake less than 2,300 mg per day.        Grains  Servings: 6 to 8 a day  A serving is:  · 1 slice bread  · 1 ounce dry cereal  · Half a cup cooked rice, pasta or cereal  Best choices: Whole grains and any grains high in fiber. Vegetables  Servings: 4 to 5 a day  A serving is:  · 1 cup raw leafy vegetable  · Half a cup cut-up raw or cooked vegetable  · Half a cup vegetable juice  Best choices: Fresh or frozen vegetables prepared without added salt or fat.   Fruits  Servings: 4 to 5 a day  A serving is:  · 1 medium fruit  · One-quarter cup dried fruit  · Half a cup fresh, frozen, or canned fruit  · Half a cup of 100% fruit juices  Best choices: A variety of fresh fruits of different colors. Whole fruits are a better choice than fruit juices. Low-fat or fat-free dairy  Servings: 2 to 3 a day  A serving is:  · 1 cup milk  · 1 cup yogurt  · One and a half ounces cheese  Best choices: Skim or 1% milk, low-fat or fat-free yogurt or buttermilk, and low-fat cheeses.         Lean meats, poultry, fish  Servings: 6 or fewer a day  A serving is:  · 1 ounce cooked meats, poultry, or fish  · 1  egg  Best choices: Lean poultry and fish. Trim away visible fat. Broil, grill, roast, or boil instead of frying. Remove skin from poultry before eating. Limit how much red meat you eat.  Nuts, seeds, beans  Servings: 4 to 5 a week  A serving is:  · One-third cup nuts (one and a half ounces)  · 2 tablespoons nut butter or seeds  · Half a cup cooked dry beans or legumes  Best choices: Dry roasted nuts with no salt added, lentils, kidney beans, garbanzo beans, and whole delarosa beans.   Fats and oils  Servings: 2 to 3 a day  A serving is:  · 1 teaspoon vegetable oil  · 1 teaspoon soft margarine  · 1 tablespoon mayonnaise  · 2 tablespoons salad dressing  Best choices: Nut and vegetable oils (nontropical vegetable oils), such as olive and canola oil. Sweets  Servings: 5 a week or fewer  A serving is:  · 1 tablespoon sugar, maple syrup, or honey  · 1 tablespoon jam or jelly  · 1 half-ounce jelly beans (about 15)  · 1 cup lemonade  Best choices: Dried fruit can be a satisfying sweet. Choose low-fat sweets. And watch your serving sizes!      For more on the DASH eating plan, visit:  www.nhlbi.nih.gov/health/health-topics/topics/dash   Date Last Reviewed: 6/1/2016  © 4383-2051 [employer]. [address] [place] [address], [place] [address]. All rights reserved. This information is not intended as a substitute for professional medical care. Always follow your healthcare professional's instructions.

## 2020-05-15 ENCOUNTER — LAB VISIT (OUTPATIENT)
Dept: LAB | Facility: HOSPITAL | Age: 45
End: 2020-05-15
Attending: FAMILY MEDICINE
Payer: OTHER GOVERNMENT

## 2020-05-15 DIAGNOSIS — R06.02 SHORTNESS OF BREATH: ICD-10-CM

## 2020-05-15 DIAGNOSIS — R73.09 ABNORMAL GLUCOSE: ICD-10-CM

## 2020-05-15 DIAGNOSIS — R53.83 OTHER FATIGUE: ICD-10-CM

## 2020-05-15 DIAGNOSIS — Z00.01 ENCOUNTER FOR ROUTINE ADULT HEALTH EXAMINATION WITH ABNORMAL FINDINGS: ICD-10-CM

## 2020-05-15 LAB
ALBUMIN SERPL BCP-MCNC: 4.1 G/DL (ref 3.5–5.2)
ALP SERPL-CCNC: 89 U/L (ref 55–135)
ALT SERPL W/O P-5'-P-CCNC: 33 U/L (ref 10–44)
ANION GAP SERPL CALC-SCNC: 8 MMOL/L (ref 8–16)
AST SERPL-CCNC: 25 U/L (ref 10–40)
BASOPHILS # BLD AUTO: 0.03 K/UL (ref 0–0.2)
BASOPHILS NFR BLD: 0.4 % (ref 0–1.9)
BILIRUB SERPL-MCNC: 0.7 MG/DL (ref 0.1–1)
BNP SERPL-MCNC: <10 PG/ML (ref 0–99)
BUN SERPL-MCNC: 15 MG/DL (ref 6–20)
CALCIUM SERPL-MCNC: 9.4 MG/DL (ref 8.7–10.5)
CHLORIDE SERPL-SCNC: 105 MMOL/L (ref 95–110)
CHOLEST SERPL-MCNC: 202 MG/DL (ref 120–199)
CHOLEST/HDLC SERPL: 4.8 {RATIO} (ref 2–5)
CO2 SERPL-SCNC: 27 MMOL/L (ref 23–29)
CREAT SERPL-MCNC: 1.3 MG/DL (ref 0.5–1.4)
DIFFERENTIAL METHOD: NORMAL
EOSINOPHIL # BLD AUTO: 0.4 K/UL (ref 0–0.5)
EOSINOPHIL NFR BLD: 5.1 % (ref 0–8)
ERYTHROCYTE [DISTWIDTH] IN BLOOD BY AUTOMATED COUNT: 12.9 % (ref 11.5–14.5)
EST. GFR  (AFRICAN AMERICAN): >60 ML/MIN/1.73 M^2
EST. GFR  (NON AFRICAN AMERICAN): >60 ML/MIN/1.73 M^2
ESTIMATED AVG GLUCOSE: 105 MG/DL (ref 68–131)
GLUCOSE SERPL-MCNC: 99 MG/DL (ref 70–110)
HBA1C MFR BLD HPLC: 5.3 % (ref 4–5.6)
HCT VFR BLD AUTO: 50.9 % (ref 40–54)
HDLC SERPL-MCNC: 42 MG/DL (ref 40–75)
HDLC SERPL: 20.8 % (ref 20–50)
HGB BLD-MCNC: 16.4 G/DL (ref 14–18)
HIV 1+2 AB+HIV1 P24 AG SERPL QL IA: NEGATIVE
IMM GRANULOCYTES # BLD AUTO: 0.03 K/UL (ref 0–0.04)
IMM GRANULOCYTES NFR BLD AUTO: 0.4 % (ref 0–0.5)
INSULIN COLLECTION INTERVAL: NORMAL
INSULIN SERPL-ACNC: 15.5 UU/ML
LDLC SERPL CALC-MCNC: 100.8 MG/DL (ref 63–159)
LYMPHOCYTES # BLD AUTO: 1.6 K/UL (ref 1–4.8)
LYMPHOCYTES NFR BLD: 23.4 % (ref 18–48)
MCH RBC QN AUTO: 28.7 PG (ref 27–31)
MCHC RBC AUTO-ENTMCNC: 32.2 G/DL (ref 32–36)
MCV RBC AUTO: 89 FL (ref 82–98)
MONOCYTES # BLD AUTO: 0.6 K/UL (ref 0.3–1)
MONOCYTES NFR BLD: 9.2 % (ref 4–15)
NEUTROPHILS # BLD AUTO: 4.2 K/UL (ref 1.8–7.7)
NEUTROPHILS NFR BLD: 61.5 % (ref 38–73)
NONHDLC SERPL-MCNC: 160 MG/DL
NRBC BLD-RTO: 0 /100 WBC
PLATELET # BLD AUTO: 252 K/UL (ref 150–350)
PMV BLD AUTO: 9.7 FL (ref 9.2–12.9)
POTASSIUM SERPL-SCNC: 4.2 MMOL/L (ref 3.5–5.1)
PROT SERPL-MCNC: 7.6 G/DL (ref 6–8.4)
RBC # BLD AUTO: 5.72 M/UL (ref 4.6–6.2)
SODIUM SERPL-SCNC: 140 MMOL/L (ref 136–145)
TESTOST SERPL-MCNC: 400 NG/DL (ref 304–1227)
TRIGL SERPL-MCNC: 296 MG/DL (ref 30–150)
TSH SERPL DL<=0.005 MIU/L-ACNC: 1.14 UIU/ML (ref 0.4–4)
WBC # BLD AUTO: 6.88 K/UL (ref 3.9–12.7)

## 2020-05-15 PROCEDURE — 85025 COMPLETE CBC W/AUTO DIFF WBC: CPT

## 2020-05-15 PROCEDURE — 84403 ASSAY OF TOTAL TESTOSTERONE: CPT

## 2020-05-15 PROCEDURE — 83525 ASSAY OF INSULIN: CPT

## 2020-05-15 PROCEDURE — 86703 HIV-1/HIV-2 1 RESULT ANTBDY: CPT

## 2020-05-15 PROCEDURE — 36415 COLL VENOUS BLD VENIPUNCTURE: CPT | Mod: PO

## 2020-05-15 PROCEDURE — 83036 HEMOGLOBIN GLYCOSYLATED A1C: CPT

## 2020-05-15 PROCEDURE — 80053 COMPREHEN METABOLIC PANEL: CPT

## 2020-05-15 PROCEDURE — 84443 ASSAY THYROID STIM HORMONE: CPT

## 2020-05-15 PROCEDURE — 83880 ASSAY OF NATRIURETIC PEPTIDE: CPT

## 2020-05-15 PROCEDURE — 80061 LIPID PANEL: CPT

## 2020-05-27 ENCOUNTER — CLINICAL SUPPORT (OUTPATIENT)
Dept: REHABILITATION | Facility: HOSPITAL | Age: 45
End: 2020-05-27
Attending: FAMILY MEDICINE
Payer: OTHER GOVERNMENT

## 2020-05-27 ENCOUNTER — PATIENT MESSAGE (OUTPATIENT)
Dept: FAMILY MEDICINE | Facility: CLINIC | Age: 45
End: 2020-05-27

## 2020-05-27 DIAGNOSIS — G89.29 CHRONIC MIDLINE LOW BACK PAIN WITHOUT SCIATICA: ICD-10-CM

## 2020-05-27 DIAGNOSIS — M54.50 LUMBAR PAIN ON PALPATION: ICD-10-CM

## 2020-05-27 DIAGNOSIS — M54.50 CHRONIC MIDLINE LOW BACK PAIN WITHOUT SCIATICA: ICD-10-CM

## 2020-05-27 PROCEDURE — 97110 THERAPEUTIC EXERCISES: CPT | Mod: PO

## 2020-05-27 PROCEDURE — 97161 PT EVAL LOW COMPLEX 20 MIN: CPT | Mod: PO

## 2020-05-27 NOTE — PLAN OF CARE
[facility]  Physical Therapy Initial Evaluation    Name: Hany Caba  Clinic Number: [id number]    Therapy Diagnosis:   Encounter Diagnoses   Name Primary?    Chronic midline low back pain without sciatica     Lumbar pain on palpation      Physician: Ariella Cid MD    Physician Orders: PT Eval and Treat   Medical Diagnosis from Referral: M54.5,G89.29 (ICD-10-CM) - Chronic midline low back pain without sciatica     Evaluation Date: 5/27/2020  Authorization Period Expiration:12/31/2020  Plan of Care Expiration: 7/24/2020  Visit # / Visits authorized: 1/ 1    Time In: 1500  Time Out: 1545  Total Billable Time: 45 minutes    Precautions: Standard    Subjective   Date of onset: few weeks ago  History of current condition - Hany reports: an insidious onset of lower midline lumbar pain with pressure. He doesn't have increased pain with movement or trying to do normal daily activities but with pressure in that area it causes pain. He may have some mild pain in that area if he is on his feet for a long day in clinic. 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 He hasn't had chronic back in the past nothing consistent. He also is an [employer]. No numbness/tingling reported       Past Medical History:   Diagnosis Date    AR (allergic rhinitis)     KATELYNN (obstructive sleep apnea)     PPD positive, treated     Skin tag     Right upper eyelid     Hany Caba  has a past surgical history that includes Appendectomy; Ulnar nerve transposition; Septorhinoplasty; and Rotator cuff repair (Right).    Hany has a current medication list which includes the following prescription(s): pantoprazole and verapamil.    Review of patient's allergies indicates:  No Known Allergies     Imaging, X-Ray  FINDINGS:  Bone density is normal.  Minimal chronic anterior wedging of T12 and L1 is likely physiologic.  Otherwise, the lumbar vertebral bodies maintain  normal height and alignment.  There is no acute fracture or traumatic subluxation.  The facet joints maintain normal articulation.  The disc spaces are preserved.  The paraspinous soft tissues are normal.      Impression       1. As above      Electronically signed by: Barry Clark MD  Date: 05/13/2020  Time: 16:08     FINDINGS:  Mild hip joint space loss is noted bilaterally.  No convincing fracture or dislocation is noted.      Impression       See above      Electronically signed by: Joselito Voss MD  Date: 09/30/2019  Time: 13:10       Prior Therapy: none  Social History:  lives with their family  Occupation: PA  Prior Level of Function: no limitations  Current Level of Function: No limitations    Pain:  Current 0/10, worst 3/10, best 0/10   Location: Midline Lumbar spine   Description: Aching and Dull  Aggravating Factors: Prolonged Standing(a little) and pressure  Easing Factors: change position, relief pressure    Pts goals: get rid of pain      Objective   Mental status: oriented x3  Posture/ Alignment: Good    GAIT DEVIATIONS: Hany amb with normal gait.    ROM: thoracolumbar  ROM:   AROM  Comment   Flexion: WNL     Extension: WNL     Lat Flex R: WNL     Lat Flex L: WNL     Rotation R: WNL     Rotation L: WNL               *pain  HIP  * = pain,  amount of   OP = overpressure  Initial    Right° Left°   Flexion WNL WNL   Extension WNL WNL   Internal Rotation WNL WNL   External Rotation WNL WNL   Abduction WNL WNL          Hip flexion 5/5 bilateral  Hip abd left 4+/5, right 5/5    Functional Tests (* indicates w/ pain)   Squat w/o pain    Palpation:  + TTP L4-S1 left greater than right midline:multfidus area    Joint Play:  Normal jnt mobility    Pt/family was provided educational information, including: role of PT, goals for PT, scheduling - pt verbalized understanding. Discussed insurance plan with pt.         CMS Impairment/Limitation/Restriction for FOTO lumbar Survey    Therapist reviewed FOTO  scores for Hany Caba on 5/27/2020.   FOTO documents entered into [employer] - see Media section.    Limitation Score: 6%           TREATMENT   Treatment Time In: 1535  Treatment Time Out: 1545  Total Treatment time separate from Evaluation: 10 minutes    Hany received therapeutic exercises to develop strength and flexibility for 10 minutes including:  Supine pelvic tilts  Supine trunk rotations       Home Exercises and Patient Education Provided    Education provided:   - progression of therapy  - benefits of dry needling  - progression of exercises    Written Home Exercises Provided: Patient instructed to cont prior HEP.  Exercises were reviewed and Hany was able to demonstrate them prior to the end of the session.  Hany demonstrated good  understanding of the education provided.     See EMR under Patient Instructions for exercises provided 5/27/2020.      Assessment   Pt presents with midline LBP that is mostly brought on by pressure to that area and at times prolonged standing at work. He has some localized tenderness in the area of L4-S1 more on left vs right side that should respond well to a trial of dry needling and exercises. I started him on some basic exercises for mobility of that area today that I think will be helpful and we discussed a trial of dry needling which he is interested in so I will bring him next week to start that. He will benefit from long term healthy back program that will continue to implement over our visits .    Pt prognosis is Good.   Pt will benefit from skilled outpatient Physical Therapy to address the deficits stated above and in the chart below, provide pt/family education, and to maximize pt's level of independence.     Plan of care discussed with patient: Yes  Pt's spiritual, cultural and educational needs considered and patient is agreeable to the plan of care and goals as stated below:     Anticipated Barriers for therapy: none    Medical Necessity is demonstrated by  the following  History  Co-morbidities and personal factors that may impact the plan of care Co-morbidities:   PVC, SOB, Palpations    Personal Factors:   no deficits     low   Examination  Body Structures and Functions, activity limitations and participation restrictions that may impact the plan of care Body Regions:   back  lower extremities    Body Systems:    gross symmetry  ROM  strength  gross coordinated movement  gait  transfers    Participation Restrictions:   none    Activity limitations:   Learning and applying knowledge  no deficits    General Tasks and Commands  no deficits    Communication  no deficits    Mobility  no deficits    Self care  no deficits    Domestic Life  no deficits    Interactions/Relationships  no deficits    Life Areas  no deficits    Community and Social Life  no deficits         low   Clinical Presentation stable and uncomplicated low   Decision Making/ Complexity Score: low     Goals:  Short Term Goals: 3 weeks   Decrease pain with pressure in lumbar spine  by 50% or more  Able to stand at work/clinic w/o pain    Long Term Goals: 6 weeks   1/10 or less pain with pressure in lumbar spine  Independent with HEP        Plan   Plan of care Certification: 5/27/2020 to 7/24/2020 .    Outpatient Physical Therapy 2 times weekly for 8 weeks to include the following interventions: Manual Therapy, Neuromuscular Re-ed, Patient Education, Therapeutic Exercise and dry needling.     Nick Ji, PT

## 2020-05-28 PROBLEM — M54.50 LUMBAR PAIN ON PALPATION: Status: ACTIVE | Noted: 2020-05-28

## 2020-06-03 ENCOUNTER — CLINICAL SUPPORT (OUTPATIENT)
Dept: REHABILITATION | Facility: HOSPITAL | Age: 45
End: 2020-06-03
Attending: FAMILY MEDICINE
Payer: OTHER GOVERNMENT

## 2020-06-03 DIAGNOSIS — M54.50 LUMBAR PAIN ON PALPATION: ICD-10-CM

## 2020-06-03 PROCEDURE — 97140 MANUAL THERAPY 1/> REGIONS: CPT | Mod: PO

## 2020-06-03 NOTE — PROGRESS NOTES
Physical Therapy Daily Treatment Note     Name: Hany [facility] Number: [id number]    Therapy Diagnosis:   Encounter Diagnosis   Name Primary?    Lumbar pain on palpation      Physician: Ariella Cid MD    Visit Date: 6/3/2020   Physician Orders: PT Eval and Treat   Medical Diagnosis from Referral: M54.5,G89.29 (ICD-10-CM) - Chronic midline low back pain without sciatica      Evaluation Date: 5/27/2020  Authorization Period Expiration:12/31/2020  Plan of Care Expiration: 7/24/2020  Visit # / Visits authorized: 2/ 12     Time In: 1634  Time Out: 1705  Total Billable Time: 25 minutes     Precautions: Standard      Subjective     Pt reports: no change in symptoms still midline lumbar pain with pressure in that area  .  He was compliant with home exercise program.  Response to previous treatment: no changes  Functional change: no changes    Pain: 0/10  Location: midline back      Objective     Hany received therapeutic exercises to develop flexibility for 1 minutes including:  lexx pose stretch    Hany received the following manual therapy techniques: Soft tissue Mobilization were applied to the: lumbar spine for 25 minutes, including:  Soft tissue mobilization to multifidus /paraspinals      Soft Tissue Palpation Assessment to determine the necessity for Functional Dry Needling  .   Patient provided written and verbal consent to FDN.   FDN performed to L4-5 multfidus bilateral with electrical stimulation      Home Exercises Provided and Patient Education Provided     Education provided:   - stay moving  - may have some localized soreness    Written Home Exercises Provided: Patient instructed to cont prior HEP.  Exercises were reviewed and Hany was able to demonstrate them prior to the end of the session.  Hany demonstrated good  understanding of the education provided.     See EMR under Patient Instructions for exercises provided prior visit.    Assessment     Patient did well with treatment today  with decreased pain to pressure in the area afterwards. He will continue on HEP and then follow up next week for possible additional treatment if needed.     Hany is progressing well towards his goals.   Pt prognosis is Good.     Pt will continue to benefit from skilled outpatient physical therapy to address the deficits listed in the problem list box on initial evaluation, provide pt/family education and to maximize pt's level of independence in the home and community environment.     Pt's spiritual, cultural and educational needs considered and pt agreeable to plan of care and goals.    Anticipated barriers to physical therapy: none    Goals:  Short Term Goals: 3 weeks   Decrease pain with pressure in lumbar spine  by 50% or more, progressing, not met  Able to stand at work/clinic w/o pain, progressing, not met     Long Term Goals: 6 weeks   1/10 or less pain with pressure in lumbar spine, progressing, not met  Independent with HEP, progressing, not met           Plan   Plan of care Certification: 5/27/2020 to 7/24/2020 .     Outpatient Physical Therapy 2 times weekly for 8 weeks to include the following interventions: Manual Therapy, Neuromuscular Re-ed, Patient Education, Therapeutic Exercise and dry needling.       Nick Ji, PT

## 2020-06-12 NOTE — PROGRESS NOTES
Physical Therapy Daily Treatment Note     Name: Hany [facility] Number: [id number]    Therapy Diagnosis:   Encounter Diagnosis   Name Primary?    Lumbar pain on palpation      Physician: Ariella Cid MD    Visit Date: 6/15/2020   Physician Orders: PT Eval and Treat   Medical Diagnosis from Referral: M54.5,G89.29 (ICD-10-CM) - Chronic midline low back pain without sciatica      Evaluation Date: 5/27/2020  Authorization Period Expiration:12/31/2020  Plan of Care Expiration: 7/24/2020  Visit # / Visits authorized: 3/ 12     Time In: 1630  Time Out: 1700  Total Billable Time: 30 minutes     Precautions: Standard      Subjective     Pt reports: he is having some decrease in pressure point pain in that area from last visit.   .  He was compliant with home exercise program.  Response to previous treatment: no changes  Functional change: no changes    Pain: 0/10  Location: midline back      Objective     Hany received therapeutic exercises to develop flexibility for 0 minutes including:      Hany received the following manual therapy techniques: Soft tissue Mobilization were applied to the: lumbar spine for 25 minutes, including:  Soft tissue mobilization to lumbar multifidus /paraspinals      Soft Tissue Palpation Assessment to determine the necessity for Functional Dry Needling  .   Patient provided written and verbal consent to FDN.   FDN performed to L3-5 multfidus bilateral with electrical stimulation      Home Exercises Provided and Patient Education Provided     Education provided:   - may have some localized soreness    Written Home Exercises Provided: Patient instructed to cont prior HEP and add in prone and lateral planks  Exercises were reviewed and Hany was able to demonstrate them prior to the end of the session.  Hany demonstrated good  understanding of the education provided.     See EMR under Patient Instructions for exercises provided prior visit.    Assessment     Patient is noting some  overall improvement with his pain after last treatment but does still have some pain in that area with pressure. I extended his time with electrical stimulation today to see if that would be helpful and gave him some additional exercises. I may need to add some additional Posterior to anterior central lumbar mobilization on next visit .     Hany is progressing well towards his goals.   Pt prognosis is Good.     Pt will continue to benefit from skilled outpatient physical therapy to address the deficits listed in the problem list box on initial evaluation, provide pt/family education and to maximize pt's level of independence in the home and community environment.     Pt's spiritual, cultural and educational needs considered and pt agreeable to plan of care and goals.    Anticipated barriers to physical therapy: none    Goals:  Short Term Goals: 3 weeks   Decrease pain with pressure in lumbar spine  by 50% or more, progressing, not met  Able to stand at work/clinic w/o pain, progressing, not met     Long Term Goals: 6 weeks   1/10 or less pain with pressure in lumbar spine, progressing, not met  Independent with HEP, progressing, not met           Plan   Plan of care Certification: 5/27/2020 to 7/24/2020 .     Outpatient Physical Therapy 2 times weekly for 8 weeks to include the following interventions: Manual Therapy, Neuromuscular Re-ed, Patient Education, Therapeutic Exercise and dry needling.       Nick Ji, PT

## 2020-06-15 ENCOUNTER — CLINICAL SUPPORT (OUTPATIENT)
Dept: REHABILITATION | Facility: HOSPITAL | Age: 45
End: 2020-06-15
Attending: FAMILY MEDICINE
Payer: OTHER GOVERNMENT

## 2020-06-15 DIAGNOSIS — M54.50 LUMBAR PAIN ON PALPATION: ICD-10-CM

## 2020-06-15 PROCEDURE — 97140 MANUAL THERAPY 1/> REGIONS: CPT | Mod: PO

## 2020-06-29 ENCOUNTER — CLINICAL SUPPORT (OUTPATIENT)
Dept: REHABILITATION | Facility: HOSPITAL | Age: 45
End: 2020-06-29
Attending: FAMILY MEDICINE
Payer: OTHER GOVERNMENT

## 2020-06-29 DIAGNOSIS — M54.50 LUMBAR PAIN ON PALPATION: Primary | ICD-10-CM

## 2020-06-29 PROCEDURE — 97140 MANUAL THERAPY 1/> REGIONS: CPT | Mod: PO

## 2020-06-29 NOTE — PROGRESS NOTES
Physical Therapy Daily Treatment Note     [id number]    Therapy Diagnosis:   Encounter Diagnosis   Name Primary?    Lumbar pain on palpation Yes     Physician: Ariella Cid MD    Visit Date: 6/29/2020   Physician Orders: PT Eval and Treat   Medical Diagnosis from Referral: M54.5,G89.29 (ICD-10-CM) - Chronic midline low back pain without sciatica      Evaluation Date: 5/27/2020  Authorization Period Expiration:12/31/2020  Plan of Care Expiration: 7/24/2020  Visit # / Visits authorized: 4/ 12     Time In: 1630  Time Out: 1700  Total Billable Time: 30 minutes     Precautions: Standard      Subjective     Pt reports: his pain is better than previously but hasn't noticed a big change since last visit.   .  He was compliant with home exercise program.  Response to previous treatment: able to tolerate more pressure  Functional change:     Pain: 0/10  Location: midline back      Objective     Hany received therapeutic exercises to develop flexibility for 0 minutes including:      Hany received the following manual therapy techniques: Soft tissue Mobilization were applied to the: lumbar spine for 25 minutes, including:  Soft tissue mobilization to lumbar multifidus /paraspinals      Soft Tissue Palpation Assessment to determine the necessity for Functional Dry Needling  .   Patient provided written and verbal consent to FDN.   FDN performed to L3-5 multfidus bilateral with electrical stimulation      Home Exercises Provided and Patient Education Provided     Education provided:   - instructed in self lumbar rotational mobilization    Written Home Exercises Provided: Patient instructed to cont prior HEP and add in prone and lateral planks  Exercises were reviewed and Hany was able to demonstrate them prior to the end of the session.  Hany demonstrated good  understanding of the education provided.     See EMR under Patient Instructions for exercises provided prior  visit.    Assessment     Patient has had some benefit with treatment to date so I provided an additional exercise that may be beneficial to help mobilize his lumbar spine area which could help with pain generation in the area. He will see how he does over the next 2 weeks and then follow up with me.     Hany is progressing well towards his goals.   Pt prognosis is Good.     Pt will continue to benefit from skilled outpatient physical therapy to address the deficits listed in the problem list box on initial evaluation, provide pt/family education and to maximize pt's level of independence in the home and community environment.     Pt's spiritual, cultural and educational needs considered and pt agreeable to plan of care and goals.    Anticipated barriers to physical therapy: none    Goals:  Short Term Goals: 3 weeks   Decrease pain with pressure in lumbar spine  by 50% or more, progressing, not met  Able to stand at work/clinic w/o pain, progressing, not met     Long Term Goals: 6 weeks   1/10 or less pain with pressure in lumbar spine, progressing, not met  Independent with HEP, progressing, not met           Plan   Plan of care Certification: 5/27/2020 to 7/24/2020 .     Outpatient Physical Therapy 2 times weekly for 8 weeks to include the following interventions: Manual Therapy, Neuromuscular Re-ed, Patient Education, Therapeutic Exercise and dry needling.       Nick Ji, PT

## 2020-07-15 ENCOUNTER — CLINICAL SUPPORT (OUTPATIENT)
Dept: REHABILITATION | Facility: HOSPITAL | Age: 45
End: 2020-07-15
Attending: FAMILY MEDICINE
Payer: OTHER GOVERNMENT

## 2020-07-15 DIAGNOSIS — M54.50 LUMBAR PAIN ON PALPATION: Primary | ICD-10-CM

## 2020-07-15 PROCEDURE — 97110 THERAPEUTIC EXERCISES: CPT | Mod: PO

## 2020-07-16 NOTE — PLAN OF CARE
[facility]  Physical Therapy reassessment / updated POC    Name: Hany Caba  Clinic Number: [id number]    Therapy Diagnosis:   Encounter Diagnosis   Name Primary?    Lumbar pain on palpation Yes     Physician: Ariella Cid MD    Physician Orders: PT Eval and Treat   Medical Diagnosis from Referral: M54.5,G89.29 (ICD-10-CM) - Chronic midline low back pain without sciatica     Evaluation Date: 7/15/2020  Authorization Period Expiration:12/31/2020  Plan of Care Expiration: 7/24/2020  Visit # / Visits authorized: 5/ 12    Time In: 1630  Time Out: 1715  Total Billable Time: 25 minutes    Precautions: Standard    Subjective   Date of onset: few weeks ago  History of current condition - Hany reports: an insidious onset of lower midline lumbar pain with pressure. He doesn't have increased pain with movement or trying to do normal daily activities but with pressure in that area it causes pain. He may have some mild pain in that area if he is on his feet for a long day in clinic. 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 He hasn't had chronic back in the past nothing consistent. He also is an [employer]. No numbness/tingling reported    7/16/2020  Patient report she has been doing well and hasn't thought about the pain since last visit so he thinks it has been helping.       Past Medical History:   Diagnosis Date    AR (allergic rhinitis)     KATELYNN (obstructive sleep apnea)     PPD positive, treated     Skin tag     Right upper eyelid     Hany Caba  has a past surgical history that includes Appendectomy; Ulnar nerve transposition; Septorhinoplasty; and Rotator cuff repair (Right).    Hany has a current medication list which includes the following prescription(s): pantoprazole and verapamil.    Review of patient's allergies indicates:  No Known Allergies     Imaging, X-Ray  FINDINGS:  Bone density is normal.  Minimal chronic  anterior wedging of T12 and L1 is likely physiologic.  Otherwise, the lumbar vertebral bodies maintain normal height and alignment.  There is no acute fracture or traumatic subluxation.  The facet joints maintain normal articulation.  The disc spaces are preserved.  The paraspinous soft tissues are normal.      Impression       1. As above      Electronically signed by: Barry Clark MD  Date: 05/13/2020  Time: 16:08     FINDINGS:  Mild hip joint space loss is noted bilaterally.  No convincing fracture or dislocation is noted.      Impression       See above      Electronically signed by: Joselito Voss MD  Date: 09/30/2019  Time: 13:10       Prior Therapy: none  Social History:  lives with their family  Occupation: PA  Prior Level of Function: no limitations  Current Level of Function: No limitations    Pain:  Current 0/10, worst 1/10, best 0/10   Location: Midline Lumbar spine   Description: Aching and Dull  Aggravating Factors: Prolonged Standing(a little) and pressure  Easing Factors: change position, relief pressure    Pts goals: get rid of pain      Objective   Mental status: oriented x3  Posture/ Alignment: Good    GAIT DEVIATIONS: Hany amb with normal gait.    ROM: thoracolumbar  ROM:   AROM  Comment   Flexion: WNL     Extension: WNL     Lat Flex R: WNL     Lat Flex L: WNL     Rotation R: WNL     Rotation L: WNL               *pain  HIP  * = pain,  amount of   OP = overpressure  Initial    Right° Left°   Flexion WNL WNL   Extension WNL WNL   Internal Rotation WNL WNL   External Rotation WNL WNL   Abduction WNL WNL          Hip flexion 5/5 bilateral  Hip abd left 4+/5, right 5/5    Functional Tests (* indicates w/ pain)   Squat w/o pain    Palpation:  + TTP L4-S1,  midline:multfidus area    Joint Play:  Normal jnt mobility    Pt/family was provided educational information, including: role of PT, goals for PT, scheduling - pt verbalized understanding. Discussed insurance plan with pt.              TREATMENT   Treatment Time In: 1645  Treatment Time Out: 1710  Total Treatment time separate from Evaluation: 25 minutes    Hany received therapeutic exercises to develop flexibility for 0 minutes including:        Hany received the following manual therapy techniques: Soft tissue Mobilization were applied to the: lumbar spine for 25 minutes, including:  Soft tissue mobilization to lumbar multifidus /paraspinals        Soft Tissue Palpation Assessment to determine the necessity for Functional Dry Needling  .   Patient provided written and verbal consent to FDN.   FDN performed to L2-S1 multfidus bilateral with electrical stimulation    Home Exercises and Patient Education Provided    Education provided:   - progression of therapy  - progression of exercises    Written Home Exercises Provided: Patient instructed to cont prior HEP.  Exercises were reviewed and Hany was able to demonstrate them prior to the end of the session.  Hany demonstrated good  understanding of the education provided.     See EMR under Patient Instructions for exercises provided 5/27/2020.      Assessment   Pt is doing well with more localized and less intense pain with pressure. He will continue on HEP and follow up in 2-3 weeks.     Pt prognosis is Good.   Pt will benefit from skilled outpatient Physical Therapy to address the deficits stated above and in the chart below, provide pt/family education, and to maximize pt's level of independence.     Plan of care discussed with patient: Yes  Pt's spiritual, cultural and educational needs considered and patient is agreeable to the plan of care and goals as stated below:     Anticipated Barriers for therapy: none    Goals:  Short Term Goals: 3 weeks   Decrease pain with pressure in lumbar spine  by 50% or more: met  Able to stand at work/clinic w/o pain: met    Long Term Goals: 6 weeks   1/10 or less pain with pressure in lumbar spine: progressing, not met  Independent with HEP:  met        Plan   Plan of care Certification: 7/15/2020 to 1/24/2020 .    Outpatient Physical Therapy 2 times weekly for 6 weeks to include the following interventions: Manual Therapy, Neuromuscular Re-ed, Patient Education, Therapeutic Exercise and dry needling.     Nick Ji, PT

## 2020-07-21 NOTE — TELEPHONE ENCOUNTER
No new care gaps identified.  Powered by [employer]. Reference number: [id number]. 7/21/2020 7:36:07 AM GARYT

## 2020-07-22 RX ORDER — PANTOPRAZOLE SODIUM 40 MG/1
TABLET, DELAYED RELEASE ORAL
Qty: 90 TABLET | Refills: 3 | Status: SHIPPED | OUTPATIENT
Start: 2020-07-22 | End: 2021-03-26 | Stop reason: SDUPTHER

## 2020-07-22 NOTE — PROGRESS NOTES
Refill Routing Note   Medication(s) are not appropriate for processing by [facility]:       - Medication not previously prescribed by PCP        Medication Therapy Plan: GERD LCO at LOV; Pantoprazole not previously prescribed by you; Defer to you   Medication reconciliation completed: No      Automatic Epic Generated Protocol Data:        Requested Prescriptions   Pending Prescriptions Disp Refills    pantoprazole (PROTONIX) 40 MG tablet [Pharmacy Med Name: PANTOPRAZOLE SODIUM DR TABS 40MG] 90 tablet 3     Sig: TAKE 1 TABLET DAILY       Gastroenterology: Proton Pump Inhibitors 2 Failed - 7/21/2020  7:35 AM        Failed - An appropriate indication is on the problem list        Passed - Patient is at least 18 years old        Passed - Osteoporosis is not on problem list        Passed - Office visit in past 6 months or future 90 days.     Recent Outpatient Visits            2 months ago PVC (premature ventricular contraction)    [facility] Cardiology Raul Pop III, MD    2 months ago Encounter for routine adult health examination with abnormal findings    [facility] Medicine Ariella Cid MD    5 months ago PVC (premature ventricular contraction)    [facility] Cardiology Jeanine Yarbrough NP    5 months ago [facility] - Dermatology Madalyn Byrd MD    7 months ago [facility] Dermatology Madalyn Byrd MD          Future Appointments              In 3 weeks Nick Ji, SANDRA Kennedysmarylu [place] [facility], [place]    In 3 weeks LISET Blanchard OD [place] [facility], [place]    In 2 months Raul Pop III, MD [facility] Cardiology, [facility] Card                      Appointments  past 12m or future 3m with PCP    Date Provider   Last Visit   5/13/2020 Ariella Cid MD   Next Visit   Visit date not found Ariella Cid MD   ED visits in past 90 days: 0     Note composed:11:11 AM 07/22/2020

## 2020-08-14 ENCOUNTER — OFFICE VISIT (OUTPATIENT)
Dept: OPTOMETRY | Facility: CLINIC | Age: 45
End: 2020-08-14
Payer: OTHER GOVERNMENT

## 2020-08-14 DIAGNOSIS — D31.31 CHOROIDAL NEVUS, RIGHT: ICD-10-CM

## 2020-08-14 DIAGNOSIS — H43.393 VITREOUS FLOATERS, BILATERAL: ICD-10-CM

## 2020-08-14 DIAGNOSIS — H52.4 MYOPIA WITH ASTIGMATISM AND PRESBYOPIA, BILATERAL: ICD-10-CM

## 2020-08-14 DIAGNOSIS — Z01.00 EXAMINATION OF EYES AND VISION: Primary | ICD-10-CM

## 2020-08-14 DIAGNOSIS — H52.203 MYOPIA WITH ASTIGMATISM AND PRESBYOPIA, BILATERAL: ICD-10-CM

## 2020-08-14 DIAGNOSIS — H52.13 MYOPIA WITH ASTIGMATISM AND PRESBYOPIA, BILATERAL: ICD-10-CM

## 2020-08-14 PROCEDURE — 99213 OFFICE O/P EST LOW 20 MIN: CPT | Mod: PBBFAC,PO | Performed by: OPTOMETRIST

## 2020-08-14 PROCEDURE — 92015 DETERMINE REFRACTIVE STATE: CPT | Mod: ,,, | Performed by: OPTOMETRIST

## 2020-08-14 PROCEDURE — 99999 PR PBB SHADOW E&M-EST. PATIENT-LVL III: ICD-10-PCS | Mod: PBBFAC,,, | Performed by: OPTOMETRIST

## 2020-08-14 PROCEDURE — 92015 PR REFRACTION: ICD-10-PCS | Mod: ,,, | Performed by: OPTOMETRIST

## 2020-08-14 PROCEDURE — 99999 PR PBB SHADOW E&M-EST. PATIENT-LVL III: CPT | Mod: PBBFAC,,, | Performed by: OPTOMETRIST

## 2020-08-14 PROCEDURE — 92004 PR EYE EXAM, NEW PATIENT,COMPREHESV: ICD-10-PCS | Mod: S$PBB,,, | Performed by: OPTOMETRIST

## 2020-08-14 PROCEDURE — 92004 COMPRE OPH EXAM NEW PT 1/>: CPT | Mod: S$PBB,,, | Performed by: OPTOMETRIST

## 2020-08-14 NOTE — PATIENT INSTRUCTIONS
"DRY EYES -- BURNING OR ROBINSON SYMPTOMS:  Use Over The Counter artificial tears as needed for dry eye symptoms.   Some common brands include:  Systane, Optive, Refresh, and Thera-Tears.  These drops can be used as frequently as desired, but may be most helpful use during long periods of concentrated work.  For example, reading / working at the computer. Start with 3-4x per day.     Nighttime Ophthalmic gel or ointments are available: Refresh PM, Genteal, and Lacrilube.    Avoid drops that "get redness out" (Visine, Murine, Clear Eyes), as these may contain medication that could further irritate the eyes, especially with chronic use.    ALLERGY EYES -- ITCHING SYMPTOMS:  Over the counter medications include--Pataday, Zaditor, and Alaway.  Use as directed 1-2 drops daily for symptoms of itching / watering eyes.  These drops will not help for dry eye or exposure symptoms.    REDNESS RELIEF:  Lumify---is a good redness reliever that will not cause irritation if used chronically.         FLASHES / FLOATERS / POSTERIOR VITREOUS DETACHMENT    Call the clinic if you have any further changes in symptoms.  Including:  Increased numbers of floaters or flashing lights, dimness or darkness that moves through or stays constant in your vision, or any pain in the eye (s).    You may sometimes see small specks or clouds moving in your field of vision.  They are called FLOATERS.  You can often see them when looking at a plain background, like a blank wall or blue clifton.  Floaters are actually tiny clumps of gel or cells inside the VITREOUS, the clear jelly-like fluid that fills the inside of your eye.    While these objects look like they are in front of your eye, they are actually floating inside.  What you see are the shadows they cast on the RETINA, the nerve layer at the back of the eye that senses light and allows you to see.      POSTERIOR VITREOUS DETACHMENT    The appearance of new floaters may be alarming.  If you suddenly " develop new floaters, you should contact your eye care professional  right away.    The retina can tear if the shrinking vitreous pulls away from the wall of the eye.  This sometimes causes a small amount of bleeding in the eye that may appear as new floaters.    A torn retina is always a serious problem, since it can lead to a retinal detachment.  You should see your eye care professional as soon as possible if:     even one new floater appears suddenly;   you see sudden flashes of light;   you notice other symptoms, like the loss of side vision, or a curtain closes down in your vision        POSTERIOR VITREOUS DETACHMENT is more common for people who:     are nearsighted;   have had cataract surgery;   have had YAG laser surgery of the eye;   have had inflammation inside the eye;   are over age 60.      While some floaters may remain visible, many of them will fade over time and become less noticeable.  Even if you've had some floaters for years, you should have your eyes checked as soon as possible if you notice new ones.    FLASHING LIGHTS    When the vitreous gel rubs or pulls on the retina, you may see what look like flashing lights or lightning streaks.  These flashes can appear off and on for several weeks or months.      Some people experience flashes of light that appear as jagged lines or heat waves in both eyes, lasting 10-20 minutes.  These flashes are caused by a spasm of blood vessels in the brain, which is called a migraine.    If a headache follows these flashes, it's called a migraine headache.  If   no headache occurs, these flashes are called Ophthalmic or Ocular Migraine.

## 2020-08-14 NOTE — PROGRESS NOTES
HPI     Routine eye exam-dle-5 years    Pt complains of blurred vision at distance. Has rx glasses he wears for   night driving sometimes. Denies any eye pain. No flashes or floaters.     Last edited by Beverly Vega on 8/14/2020  2:57 PM. (History)        ROS     Positive for: Eyes    Negative for: Constitutional, Gastrointestinal, Neurological, Skin,   Genitourinary, Musculoskeletal, HENT, Endocrine, Cardiovascular,   Respiratory, Psychiatric, Allergic/Imm, Heme/Lymph    Last edited by LISET Blanchard, OD on 8/14/2020  3:03 PM. (History)        Assessment /Plan     For exam results, see Encounter Report.    Examination of eyes and vision    Vitreous floaters, bilateral    Myopia with astigmatism and presbyopia, bilateral    Choroidal nevus, right      1. Ocular health exam OU  2. Mild OU, RD precautions given and reviewed. Patient knows to call/ message if any further changes in symptoms occur.  3. Updated specs for distance only  Discussed s/s of presbyopia , may need readers or bifocals next exam  4. 2DD flat nevus / hyperpig sup nasal OD    Discussed and educated patient on current findings /plan.  RTC 1 year, prn if any changes / issues

## 2020-09-14 ENCOUNTER — ANESTHESIA EVENT (OUTPATIENT)
Dept: SURGERY | Facility: HOSPITAL | Age: 45
End: 2020-09-14
Payer: OTHER GOVERNMENT

## 2020-09-14 ENCOUNTER — ANESTHESIA (OUTPATIENT)
Dept: SURGERY | Facility: HOSPITAL | Age: 45
End: 2020-09-14
Payer: OTHER GOVERNMENT

## 2020-09-14 ENCOUNTER — OFFICE VISIT (OUTPATIENT)
Dept: ORTHOPEDICS | Facility: CLINIC | Age: 45
End: 2020-09-14
Payer: OTHER GOVERNMENT

## 2020-09-14 ENCOUNTER — HOSPITAL ENCOUNTER (OUTPATIENT)
Facility: HOSPITAL | Age: 45
Discharge: HOME | End: 2020-09-14
Attending: ORTHOPAEDIC SURGERY | Admitting: ORTHOPAEDIC SURGERY
Payer: OTHER GOVERNMENT

## 2020-09-14 VITALS
HEIGHT: 73 IN | DIASTOLIC BLOOD PRESSURE: 63 MMHG | OXYGEN SATURATION: 96 % | TEMPERATURE: 97 F | RESPIRATION RATE: 12 BRPM | BODY MASS INDEX: 26.9 KG/M2 | WEIGHT: 203 LBS | SYSTOLIC BLOOD PRESSURE: 126 MMHG | HEART RATE: 60 BPM

## 2020-09-14 DIAGNOSIS — S52.551A OTHER CLOSED EXTRA-ARTICULAR FRACTURE OF DISTAL END OF RIGHT RADIUS, INITIAL ENCOUNTER: Primary | ICD-10-CM

## 2020-09-14 DIAGNOSIS — Z01.818 PREOP EXAMINATION: ICD-10-CM

## 2020-09-14 DIAGNOSIS — S52.551A CLOSED EXTRA-ARTICULAR FRACTURE OF DISTAL END OF RIGHT RADIUS, INITIAL ENCOUNTER: ICD-10-CM

## 2020-09-14 DIAGNOSIS — S33.5XXA LUMBAR SPRAIN, INITIAL ENCOUNTER: ICD-10-CM

## 2020-09-14 DIAGNOSIS — S52.551A: ICD-10-CM

## 2020-09-14 DIAGNOSIS — S52.551A OTHER CLOSED EXTRA-ARTICULAR FRACTURE OF DISTAL END OF RIGHT RADIUS, INITIAL ENCOUNTER: ICD-10-CM
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
## 2020-09-14 DIAGNOSIS — S83.8X1A SPRAIN OF OTHER LIGAMENT OF RIGHT KNEE, INITIAL ENCOUNTER: ICD-10-CM

## 2020-09-14 LAB
ALBUMIN SERPL BCP-MCNC: 4.1 G/DL (ref 3.5–5.2)
ALP SERPL-CCNC: 95 U/L (ref 55–135)
ALT SERPL W/O P-5'-P-CCNC: 25 U/L (ref 10–44)
ANION GAP SERPL CALC-SCNC: 8 MMOL/L (ref 8–16)
AST SERPL-CCNC: 27 U/L (ref 10–40)
BASOPHILS # BLD AUTO: 0.03 K/UL (ref 0–0.2)
BASOPHILS NFR BLD: 0.5 % (ref 0–1.9)
BILIRUB SERPL-MCNC: 0.5 MG/DL (ref 0.1–1)
BUN SERPL-MCNC: 18 MG/DL (ref 6–20)
CALCIUM SERPL-MCNC: 8.9 MG/DL (ref 8.7–10.5)
CHLORIDE SERPL-SCNC: 107 MMOL/L (ref 95–110)
CO2 SERPL-SCNC: 25 MMOL/L (ref 23–29)
CREAT SERPL-MCNC: 1.1 MG/DL (ref 0.5–1.4)
DIFFERENTIAL METHOD: NORMAL
EOSINOPHIL # BLD AUTO: 0.2 K/UL (ref 0–0.5)
EOSINOPHIL NFR BLD: 2.6 % (ref 0–8)
ERYTHROCYTE [DISTWIDTH] IN BLOOD BY AUTOMATED COUNT: 13.1 % (ref 11.5–14.5)
EST. GFR  (AFRICAN AMERICAN): >60 ML/MIN/1.73 M^2
EST. GFR  (NON AFRICAN AMERICAN): >60 ML/MIN/1.73 M^2
GLUCOSE SERPL-MCNC: 85 MG/DL (ref 70–110)
HCT VFR BLD AUTO: 43.1 % (ref 40–54)
HGB BLD-MCNC: 14.1 G/DL (ref 14–18)
IMM GRANULOCYTES # BLD AUTO: 0.01 K/UL (ref 0–0.04)
IMM GRANULOCYTES NFR BLD AUTO: 0.2 % (ref 0–0.5)
LYMPHOCYTES # BLD AUTO: 1.6 K/UL (ref 1–4.8)
LYMPHOCYTES NFR BLD: 24.8 % (ref 18–48)
MCH RBC QN AUTO: 28.7 PG (ref 27–31)
MCHC RBC AUTO-ENTMCNC: 32.7 G/DL (ref 32–36)
MCV RBC AUTO: 88 FL (ref 82–98)
MONOCYTES # BLD AUTO: 0.6 K/UL (ref 0.3–1)
MONOCYTES NFR BLD: 8.3 % (ref 4–15)
NEUTROPHILS # BLD AUTO: 4.2 K/UL (ref 1.8–7.7)
NEUTROPHILS NFR BLD: 63.6 % (ref 38–73)
NRBC BLD-RTO: 0 /100 WBC
PLATELET # BLD AUTO: 204 K/UL (ref 150–350)
PMV BLD AUTO: 9.4 FL (ref 9.2–12.9)
POTASSIUM SERPL-SCNC: 3.7 MMOL/L (ref 3.5–5.1)
PROT SERPL-MCNC: 6.9 G/DL (ref 6–8.4)
RBC # BLD AUTO: 4.91 M/UL (ref 4.6–6.2)
SARS-COV-2 RDRP RESP QL NAA+PROBE: NEGATIVE
SODIUM SERPL-SCNC: 140 MMOL/L (ref 136–145)
WBC # BLD AUTO: 6.62 K/UL (ref 3.9–12.7)

## 2020-09-14 PROCEDURE — 64415 NJX AA&/STRD BRCH PLXS IMG: CPT | Performed by: ANESTHESIOLOGY

## 2020-09-14 PROCEDURE — 64415 NJX AA&/STRD BRCH PLXS IMG: CPT | Mod: 59,RT,, | Performed by: ANESTHESIOLOGY

## 2020-09-14 PROCEDURE — 36000706: Performed by: ORTHOPAEDIC SURGERY

## 2020-09-14 PROCEDURE — 63600175 PHARM REV CODE 636 W HCPCS: Performed by: ANESTHESIOLOGY

## 2020-09-14 PROCEDURE — 25000003 PHARM REV CODE 250: Performed by: ORTHOPAEDIC SURGERY

## 2020-09-14 PROCEDURE — 27200651 HC AIRWAY, LMA: Performed by: NURSE ANESTHETIST, CERTIFIED REGISTERED

## 2020-09-14 PROCEDURE — 71000033 HC RECOVERY, INTIAL HOUR: Performed by: ORTHOPAEDIC SURGERY

## 2020-09-14 PROCEDURE — 36415 COLL VENOUS BLD VENIPUNCTURE: CPT

## 2020-09-14 PROCEDURE — D9220A PRA ANESTHESIA: Mod: CRNA,,, | Performed by: NURSE ANESTHETIST, CERTIFIED REGISTERED

## 2020-09-14 PROCEDURE — 71000039 HC RECOVERY, EACH ADD'L HOUR: Performed by: ORTHOPAEDIC SURGERY

## 2020-09-14 PROCEDURE — 27200750 HC INSULATED NEEDLE/ STIMUPLEX: Performed by: ANESTHESIOLOGY

## 2020-09-14 PROCEDURE — C9290 INJ, BUPIVACAINE LIPOSOME: HCPCS | Performed by: ANESTHESIOLOGY

## 2020-09-14 PROCEDURE — D9220A PRA ANESTHESIA: Mod: ANES,,, | Performed by: ANESTHESIOLOGY

## 2020-09-14 PROCEDURE — 76942 PR U/S GUIDANCE FOR NEEDLE GUIDANCE: ICD-10-PCS | Mod: 26,,, | Performed by: ANESTHESIOLOGY

## 2020-09-14 PROCEDURE — D9220A PRA ANESTHESIA: ICD-10-PCS | Mod: ANES,,, | Performed by: ANESTHESIOLOGY

## 2020-09-14 PROCEDURE — C1769 GUIDE WIRE: HCPCS | Performed by: ORTHOPAEDIC SURGERY

## 2020-09-14 PROCEDURE — 63600175 PHARM REV CODE 636 W HCPCS: Performed by: NURSE ANESTHETIST, CERTIFIED REGISTERED

## 2020-09-14 PROCEDURE — 01820 ANES CLSD PX RDS U/W/H BONES: CPT | Performed by: ORTHOPAEDIC SURGERY

## 2020-09-14 PROCEDURE — 25000003 PHARM REV CODE 250: Performed by: ANESTHESIOLOGY

## 2020-09-14 PROCEDURE — 64415 PR NERVE BLOCK INJ, ANES/STEROID, BRACHIAL PLEXUS, INCL IMAG GUIDANCE: ICD-10-PCS | Mod: 59,RT,, | Performed by: ANESTHESIOLOGY

## 2020-09-14 PROCEDURE — 37000008 HC ANESTHESIA 1ST 15 MINUTES: Performed by: ORTHOPAEDIC SURGERY

## 2020-09-14 PROCEDURE — U0002 COVID-19 LAB TEST NON-CDC: HCPCS

## 2020-09-14 PROCEDURE — 99203 OFFICE O/P NEW LOW 30 MIN: CPT | Mod: 57,S$GLB,, | Performed by: ORTHOPAEDIC SURGERY

## 2020-09-14 PROCEDURE — 76942 ECHO GUIDE FOR BIOPSY: CPT | Mod: 59 | Performed by: ANESTHESIOLOGY

## 2020-09-14 PROCEDURE — 99900103 DSU ONLY-NO CHARGE-INITIAL HR (STAT): Performed by: ORTHOPAEDIC SURGERY

## 2020-09-14 PROCEDURE — 71000015 HC POSTOP RECOV 1ST HR: Performed by: ORTHOPAEDIC SURGERY

## 2020-09-14 PROCEDURE — 71000016 HC POSTOP RECOV ADDL HR: Performed by: ORTHOPAEDIC SURGERY

## 2020-09-14 PROCEDURE — D9220A PRA ANESTHESIA: ICD-10-PCS | Mod: CRNA,,, | Performed by: NURSE ANESTHETIST, CERTIFIED REGISTERED

## 2020-09-14 PROCEDURE — 80053 COMPREHEN METABOLIC PANEL: CPT

## 2020-09-14 PROCEDURE — 85025 COMPLETE CBC W/AUTO DIFF WBC: CPT

## 2020-09-14 PROCEDURE — 99203 PR OFFICE/OUTPT VISIT, NEW, LEVL III, 30-44 MIN: ICD-10-PCS | Mod: 57,S$GLB,, | Performed by: ORTHOPAEDIC SURGERY

## 2020-09-14 PROCEDURE — 36000707: Performed by: ORTHOPAEDIC SURGERY

## 2020-09-14 PROCEDURE — 37000009 HC ANESTHESIA EA ADD 15 MINS: Performed by: ORTHOPAEDIC SURGERY

## 2020-09-14 PROCEDURE — 76942 ECHO GUIDE FOR BIOPSY: CPT | Mod: 26,,, | Performed by: ANESTHESIOLOGY

## 2020-09-14 PROCEDURE — 63600175 PHARM REV CODE 636 W HCPCS: Performed by: ORTHOPAEDIC SURGERY

## 2020-09-14 PROCEDURE — 99900104 DSU ONLY-NO CHARGE-EA ADD'L HR (STAT): Performed by: ORTHOPAEDIC SURGERY

## 2020-09-14 DEVICE — K-WIRE TRCR PT1.6MM DIA 150MM: Type: IMPLANTABLE DEVICE | Site: WRIST | Status: FUNCTIONAL

## 2020-09-14 DEVICE — WIRE-K 20MM X 150MM.: Type: IMPLANTABLE DEVICE | Site: WRIST | Status: FUNCTIONAL

## 2020-09-14 RX ORDER — DEXAMETHASONE SODIUM PHOSPHATE 4 MG/ML
INJECTION, SOLUTION INTRA-ARTICULAR; INTRALESIONAL; INTRAMUSCULAR; INTRAVENOUS; SOFT TISSUE
Status: DISCONTINUED | OUTPATIENT
Start: 2020-09-14 | End: 2020-09-14

## 2020-09-14 RX ORDER — SODIUM CHLORIDE 0.9 % (FLUSH) 0.9 %
10 SYRINGE (ML) INJECTION
Status: DISCONTINUED | OUTPATIENT
Start: 2020-09-14 | End: 2020-09-14 | Stop reason: HOSPADM

## 2020-09-14 RX ORDER — HYDROCODONE BITARTRATE AND ACETAMINOPHEN 5; 325 MG/1; MG/1
1 TABLET ORAL EVERY 6 HOURS PRN
Qty: 28 TABLET | Refills: 0 | Status: SHIPPED | OUTPATIENT
Start: 2020-09-14 | End: 2020-09-21

## 2020-09-14 RX ORDER — OXYCODONE HYDROCHLORIDE 5 MG/1
5 TABLET ORAL ONCE AS NEEDED
Status: DISCONTINUED | OUTPATIENT
Start: 2020-09-14 | End: 2020-09-14 | Stop reason: HOSPADM

## 2020-09-14 RX ORDER — CEFAZOLIN SODIUM 2 G/50ML
2 SOLUTION INTRAVENOUS
Status: COMPLETED | OUTPATIENT
Start: 2020-09-14 | End: 2020-09-14

## 2020-09-14 RX ORDER — LIDOCAINE HYDROCHLORIDE 20 MG/ML
INJECTION INTRAVENOUS
Status: DISCONTINUED | OUTPATIENT
Start: 2020-09-14 | End: 2020-09-14

## 2020-09-14 RX ORDER — SODIUM CHLORIDE, SODIUM LACTATE, POTASSIUM CHLORIDE, CALCIUM CHLORIDE 600; 310; 30; 20 MG/100ML; MG/100ML; MG/100ML; MG/100ML
INJECTION, SOLUTION INTRAVENOUS CONTINUOUS
Status: CANCELLED | OUTPATIENT
Start: 2020-09-14

## 2020-09-14 RX ORDER — ACETAMINOPHEN 10 MG/ML
INJECTION, SOLUTION INTRAVENOUS
Status: DISCONTINUED | OUTPATIENT
Start: 2020-09-14 | End: 2020-09-14

## 2020-09-14 RX ORDER — LIDOCAINE HYDROCHLORIDE 10 MG/ML
1 INJECTION, SOLUTION EPIDURAL; INFILTRATION; INTRACAUDAL; PERINEURAL ONCE
Status: DISCONTINUED | OUTPATIENT
Start: 2020-09-14 | End: 2020-09-14 | Stop reason: HOSPADM

## 2020-09-14 RX ORDER — FENTANYL CITRATE 50 UG/ML
25 INJECTION, SOLUTION INTRAMUSCULAR; INTRAVENOUS EVERY 5 MIN PRN
Status: DISCONTINUED | OUTPATIENT
Start: 2020-09-14 | End: 2020-09-14 | Stop reason: HOSPADM

## 2020-09-14 RX ORDER — MIDAZOLAM HYDROCHLORIDE 1 MG/ML
INJECTION, SOLUTION INTRAMUSCULAR; INTRAVENOUS
Status: DISCONTINUED | OUTPATIENT
Start: 2020-09-14 | End: 2020-09-14

## 2020-09-14 RX ORDER — FENTANYL CITRATE 50 UG/ML
INJECTION, SOLUTION INTRAMUSCULAR; INTRAVENOUS
Status: DISCONTINUED | OUTPATIENT
Start: 2020-09-14 | End: 2020-09-14

## 2020-09-14 RX ORDER — ONDANSETRON 2 MG/ML
4 INJECTION INTRAMUSCULAR; INTRAVENOUS EVERY 12 HOURS PRN
Status: CANCELLED | OUTPATIENT
Start: 2020-09-14

## 2020-09-14 RX ORDER — ONDANSETRON 2 MG/ML
INJECTION INTRAMUSCULAR; INTRAVENOUS
Status: DISCONTINUED | OUTPATIENT
Start: 2020-09-14 | End: 2020-09-14

## 2020-09-14 RX ORDER — KETOROLAC TROMETHAMINE 30 MG/ML
INJECTION, SOLUTION INTRAMUSCULAR; INTRAVENOUS
Status: DISCONTINUED | OUTPATIENT
Start: 2020-09-14 | End: 2020-09-14

## 2020-09-14 RX ORDER — MUPIROCIN 20 MG/G
OINTMENT TOPICAL
Status: CANCELLED | OUTPATIENT
Start: 2020-09-14

## 2020-09-14 RX ORDER — HYDROCODONE BITARTRATE AND ACETAMINOPHEN 5; 325 MG/1; MG/1
1 TABLET ORAL EVERY 4 HOURS PRN
Status: CANCELLED | OUTPATIENT
Start: 2020-09-14

## 2020-09-14 RX ORDER — MUPIROCIN 20 MG/G
OINTMENT TOPICAL
Status: DISCONTINUED | OUTPATIENT
Start: 2020-09-14 | End: 2020-09-14 | Stop reason: HOSPADM

## 2020-09-14 RX ORDER — PROPOFOL 10 MG/ML
VIAL (ML) INTRAVENOUS
Status: DISCONTINUED | OUTPATIENT
Start: 2020-09-14 | End: 2020-09-14

## 2020-09-14 RX ORDER — OXYCODONE HYDROCHLORIDE 10 MG/1
10 TABLET ORAL EVERY 4 HOURS PRN
Status: CANCELLED | OUTPATIENT
Start: 2020-09-14

## 2020-09-14 RX ORDER — METOCLOPRAMIDE HYDROCHLORIDE 5 MG/ML
5 INJECTION INTRAMUSCULAR; INTRAVENOUS EVERY 6 HOURS PRN
Status: CANCELLED | OUTPATIENT
Start: 2020-09-14

## 2020-09-14 RX ORDER — ONDANSETRON 2 MG/ML
4 INJECTION INTRAMUSCULAR; INTRAVENOUS ONCE AS NEEDED
Status: DISCONTINUED | OUTPATIENT
Start: 2020-09-14 | End: 2020-09-14 | Stop reason: HOSPADM

## 2020-09-14 RX ORDER — ACETAMINOPHEN 500 MG
1000 TABLET ORAL EVERY 6 HOURS PRN
COMMUNITY
End: 2020-10-13

## 2020-09-14 RX ADMIN — MIDAZOLAM 2 MG: 1 INJECTION INTRAMUSCULAR; INTRAVENOUS at 03:09

## 2020-09-14 RX ADMIN — CEFAZOLIN SODIUM 2 G: 2 SOLUTION INTRAVENOUS at 03:09

## 2020-09-14 RX ADMIN — SODIUM CHLORIDE, SODIUM GLUCONATE, SODIUM ACETATE, POTASSIUM CHLORIDE, MAGNESIUM CHLORIDE, SODIUM PHOSPHATE, DIBASIC, AND POTASSIUM PHOSPHATE: .53; .5; .37; .037; .03; .012; .00082 INJECTION, SOLUTION INTRAVENOUS at 03:09

## 2020-09-14 RX ADMIN — ACETAMINOPHEN 1000 MG: 10 INJECTION, SOLUTION INTRAVENOUS at 03:09

## 2020-09-14 RX ADMIN — PROPOFOL 200 MG: 10 INJECTION, EMULSION INTRAVENOUS at 03:09

## 2020-09-14 RX ADMIN — SODIUM CHLORIDE, SODIUM GLUCONATE, SODIUM ACETATE, POTASSIUM CHLORIDE, MAGNESIUM CHLORIDE, SODIUM PHOSPHATE, DIBASIC, AND POTASSIUM PHOSPHATE: .53; .5; .37; .037; .03; .012; .00082 INJECTION, SOLUTION INTRAVENOUS at 04:09

## 2020-09-14 RX ADMIN — MUPIROCIN: 20 OINTMENT TOPICAL at 03:09

## 2020-09-14 RX ADMIN — ONDANSETRON 4 MG: 2 INJECTION, SOLUTION INTRAMUSCULAR; INTRAVENOUS at 03:09

## 2020-09-14 RX ADMIN — FENTANYL CITRATE 100 MCG: 50 INJECTION, SOLUTION INTRAMUSCULAR; INTRAVENOUS at 03:09

## 2020-09-14 RX ADMIN — DEXAMETHASONE SODIUM PHOSPHATE 8 MG: 4 INJECTION, SOLUTION INTRA-ARTICULAR; INTRALESIONAL; INTRAMUSCULAR; INTRAVENOUS; SOFT TISSUE at 03:09

## 2020-09-14 RX ADMIN — KETOROLAC TROMETHAMINE 30 MG: 30 INJECTION, SOLUTION INTRAMUSCULAR; INTRAVENOUS at 04:09

## 2020-09-14 RX ADMIN — BUPIVACAINE 15 ML: 13.3 INJECTION, SUSPENSION, LIPOSOMAL INFILTRATION at 03:09

## 2020-09-14 RX ADMIN — LIDOCAINE HYDROCHLORIDE 100 MG: 20 INJECTION, SOLUTION INTRAVENOUS at 03:09

## 2020-09-14 NOTE — ANESTHESIA PROCEDURE NOTES
Intubation  Performed by: Tash Bergeron CRNA  Authorized by: Tash Bergeron CRNA     Intubation:     Induction:  Intravenous    Intubated:  Postinduction    Mask Ventilation:  Easy mask    Attempts:  1    Attempted By:  CRNA    Difficult Airway Encountered?: No      Complications:  None    Airway Device:  Supraglottic airway/LMA    Airway Device Size:  4.0    Style/Cuff Inflation:  Cuffed (inflated to minimal occlusive pressure)    Secured at:  The lips    Placement Verified By:  Capnometry    Complicating Factors:  None    Findings Post-Intubation:  BS equal bilateral and atraumatic/condition of teeth unchanged

## 2020-09-14 NOTE — PLAN OF CARE
Report to Amy.  Patient denies pain, nerve block to right shoulder, sling, ice, vs stable,  Respirations even and unlabored. Resting comfortably, wife  At bedside.

## 2020-09-14 NOTE — DISCHARGE INSTRUCTIONS
Post op instructions for prevention of DVT  What is deep vein thrombosis?  Deep vein thrombosis (DVT) is the medical term for blood clots in the deep veins of the leg.  These blood clots can be dangerous.  A DVT can block a blood vessel and keep blood from getting where it needs to go.  Another problem is that the clot can travel to other parts of the body such as the lungs.  A clot that travels to the lungs is called a pulmonary embolus (PE) and can cause serious problems with breathing which can lead to death.  Am I at risk for DVT/PE?  If you are not very active, you are at risk of DVT.  Anyone confined to bed, sitting for long periods of time, recovering from surgery, etc. increases the risk of DVT.  Other risk factors are cancer diagnosis, certain medications, estrogen replacement in any form,older age, obesity, pregnancy, smoking, history of clotting disorders, and dehydration.  How will I know if I have a DVT?   Swelling in the lower leg   Pain   Warmth, redness, hardness or bulging of the vein  If you have any of these symptoms, call your doctors office right away.  Some people will not have any symptoms until the clot moves to the lungs.  What are the symptoms of a PE?   Panting, shortness of breath, or trouble breathing   Sharp, knife-like chest pain when you breathe   Coughing or coughing up blood   Rapid heartbeat  If you have any of these symptoms or get worse quickly, call 911 for emergency treatment.  How can I prevent a DVT?   Avoid long periods of inactivity and dont cross your legs--get up and walk around every hour or so.   Stay active--walking after surgery is highly encouraged.  This means you should get out of the house and walk in the neighborhood.  Going up and down stairs will not impair healing (unless advised against such activity by your doctor).     Drink plenty of noncaffeinated, nonalcoholic fluids each day to prevent dehydration.   Wear special support stockings, if they  "have been advised by your doctor.   If you travel, stop at least once an hour and walk around.   Avoid smoking (assistance with stopping is available through your healthcare provider)  Always notify your doctor if you are not able to follow the post operative instructions that are given to you at the time of discharge.  It may be necessary to prescribe one of the medications available to prevent DVT.    Discharge Instructions: After Your Surgery/Procedure  Youve just had surgery. During surgery you were given medicine called anesthesia to keep you relaxed and free of pain. After surgery you may have some pain or nausea. This is common. Here are some tips for feeling better and getting well after surgery.     Stay on schedule with your medication.   Going home  Your doctor or nurse will show you how to take care of yourself when you go home. He or she will also answer your questions. Have an adult family member or friend drive you home.      For your safety we recommend these precaution for the first 24 hours after your procedure:  · Do not drive or use heavy equipment.  · Do not make important decisions or sign legal papers.  · Do not drink alcohol.  · Have someone stay with you, if needed. He or she can watch for problems and help keep you safe.  · Your concentration, balance, coordination, and judgement may be impaired for many hours after anesthesia.  Use caution when ambulating or standing up.     · You may feel weak and "washed out" after anesthesia and surgery.      Subtle residual effects of general anesthesia or sedation with regional / local anesthesia can last more than 24 hours.  Rest for the remainder of the day or longer if your Doctor/Surgeon has advised you to do so.  Although you may feel normal within the first 24 hours, your reflexes and mental ability may be impaired without you realizing it.  You may feel dizzy, lightheaded or sleepy for 24 hours or longer.      Be sure to go to all follow-up " visits with your doctor. And rest after your surgery for as long as your doctor tells you to.  Coping with pain  If you have pain after surgery, pain medicine will help you feel better. Take it as told, before pain becomes severe. Also, ask your doctor or pharmacist about other ways to control pain. This might be with heat, ice, or relaxation. And follow any other instructions your surgeon or nurse gives you.  Tips for taking pain medicine  To get the best relief possible, remember these points:  · Pain medicines can upset your stomach. Taking them with a little food may help.  · Most pain relievers taken by mouth need at least 20 to 30 minutes to start to work.  · Taking medicine on a schedule can help you remember to take it. Try to time your medicine so that you can take it before starting an activity. This might be before you get dressed, go for a walk, or sit down for dinner.  · Constipation is a common side effect of pain medicines. Call your doctor before taking any medicines such as laxatives or stool softeners to help ease constipation. Also ask if you should skip any foods. Drinking lots of fluids and eating foods such as fruits and vegetables that are high in fiber can also help. Remember, do not take laxatives unless your surgeon has prescribed them.  · Drinking alcohol and taking pain medicine can cause dizziness and slow your breathing. It can even be deadly. Do not drink alcohol while taking pain medicine.  · Pain medicine can make you react more slowly to things. Do not drive or run machinery while taking pain medicine.  Your health care provider may tell you to take acetaminophen to help ease your pain. Ask him or her how much you are supposed to take each day. Acetaminophen or other pain relievers may interact with your prescription medicines or other over-the-counter (OTC) drugs. Some prescription medicines have acetaminophen and other ingredients. Using both prescription and OTC acetaminophen for  pain can cause you to overdose. Read the labels on your OTC medicines with care. This will help you to clearly know the list of ingredients, how much to take, and any warnings. It may also help you not take too much acetaminophen. If you have questions or do not understand the information, ask your pharmacist or health care provider to explain it to you before you take the OTC medicine.  Managing nausea  Some people have an upset stomach after surgery. This is often because of anesthesia, pain, or pain medicine, or the stress of surgery. These tips will help you handle nausea and eat healthy foods as you get better. If you were on a special food plan before surgery, ask your doctor if you should follow it while you get better. These tips may help:  · Do not push yourself to eat. Your body will tell you when to eat and how much.  · Start off with clear liquids and soup. They are easier to digest.  · Next try semi-solid foods, such as mashed potatoes, applesauce, and gelatin, as you feel ready.  · Slowly move to solid foods. Dont eat fatty, rich, or spicy foods at first.  · Do not force yourself to have 3 large meals a day. Instead eat smaller amounts more often.  · Take pain medicines with a small amount of solid food, such as crackers or toast, to avoid nausea.     Call your surgeon if  · You still have pain an hour after taking medicine. The medicine may not be strong enough.  · You feel too sleepy, dizzy, or groggy. The medicine may be too strong.  · You have side effects like nausea, vomiting, or skin changes, such as rash, itching, or hives.       If you have obstructive sleep apnea  You were given anesthesia medicine during surgery to keep you comfortable and free of pain. After surgery, you may have more apnea spells because of this medicine and other medicines you were given. The spells may last longer than usual.   At home:  · Keep using the continuous positive airway pressure (CPAP) device when you sleep.  Unless your health care provider tells you not to, use it when you sleep, day or night. CPAP is a common device used to treat obstructive sleep apnea.  · Talk with your provider before taking any pain medicine, muscle relaxants, or sedatives. Your provider will tell you about the possible dangers of taking these medicines.  © 4031-1515 [employer] [address] [place] [address], [place] [address]. All rights reserved. This information is not intended as a substitute for professional medical care. Always follow your healthcare professional's instructions.    General Information:    1.  Do not drink alcoholic beverages including beer for 24 hours or as long as you are on pain medication..  2.  Do not drive a motor vehicle, operate machinery or power tools, or signs legal papers for 24 hours or as long as you are on pain medication.   3.  You may experience light-headedness, dizziness, and sleepiness following surgery. Please do not stay alone. A responsible adult should be with you for this 24 hour period.  4.  Go home and rest.  5. Progress slowly to a normal diet unless instructed.  Otherwise, begin with liquids such as soft drinks, then soup and crackers working up to solid foods. Drink plenty of nonalcoholic fluids.  6.  Certain anesthetics and pain medications produce nausea and vomiting in certain individuals. If nausea becomes a problem at home, call you doctor.  7. A nurse will be calling you sometime after surgery. Do not be alarmed. This is our way of finding out how you are doing.  8. Several times every hour while you are awake, take 2-3 deep breaths and cough. If you had stomach surgery hold a pillow or rolled towel firmly against your stomach before you cough. This will help with any pain the cough might cause.  9. Several times every hour while you are awake, pump and flex your feet 5-6 times and do foot circles. This will help prevent blood clots.  10.Call your doctor for severe pain, bleeding,  fever, or signs or symptoms of infection (pain, swelling, redness, foul odor, drainage).    We hope your stay was comfortable as you heal now, mend and rest.    For we have enjoyed taking care of you by giving your our best.    And as you get better, by regaining your health and strength;   We count it as a privilege to have served you and hope your time at [facility] was well spent.      Thank  You!!!

## 2020-09-14 NOTE — ANESTHESIA PREPROCEDURE EVALUATION
09/14/2020  Hany Caba is a 44 y.o., male.    Anesthesia Evaluation    I have reviewed the Patient Summary Reports.    I have reviewed the Nursing Notes. I have reviewed the NPO Status.   I have reviewed the Medications.     Review of Systems  Anesthesia Hx:  No problems with previous Anesthesia    Social:  Non-Smoker    Hematology/Oncology:  Hematology Normal   Oncology Normal     EENT/Dental:EENT/Dental Normal   Cardiovascular:   Dysrhythmias    Pulmonary:   Sleep Apnea    Renal/:  Renal/ Normal     Hepatic/GI:  Hepatic/GI Normal    Musculoskeletal:   Closed distal radius fracture    Neurological:  Neurology Normal    Endocrine:  Endocrine Normal    Dermatological:  Skin Normal    Psych:  Psychiatric Normal           Physical Exam  General:  Well nourished    Airway/Jaw/Neck:  Airway Findings: Mouth Opening: Normal Tongue: Normal  General Airway Assessment: Adult  Mallampati: II        Eyes/Ears/Nose:  EYES/EARS/NOSE FINDINGS: Normal   Dental:  Dental Findings: In tact   Chest/Lungs:  Chest/Lungs Findings: Clear to auscultation, Normal Respiratory Rate     Heart/Vascular:  Heart Findings: Rate: Normal  Rhythm: Regular Rhythm        Mental Status:  Mental Status Findings:  Cooperative, Alert and Oriented         Anesthesia Plan  Type of Anesthesia, risks & benefits discussed:  Anesthesia Type:  general  Patient's Preference:   Intra-op Monitoring Plan: standard ASA monitors  Intra-op Monitoring Plan Comments:   Post Op Pain Control Plan: multimodal analgesia, peripheral nerve block and IV/PO Opioids PRN  Post Op Pain Control Plan Comments:   Induction:   IV  Beta Blocker:  Patient is not currently on a Beta-Blocker (No further documentation required).       Informed Consent: Patient understands risks and agrees with Anesthesia plan.  Questions answered. Anesthesia consent signed with  patient.  ASA Score: 2     Day of Surgery Review of History & Physical: I have interviewed and examined the patient. I have reviewed the patient's H&P dated:    H&P update referred to the surgeon.         Ready For Surgery From Anesthesia Perspective.

## 2020-09-14 NOTE — PLAN OF CARE
Discharge instructions given to the pt and the pt wife  Pt denies pain sp surgery to the right arm  Dressing is dry and intact and the sling in on   Pt is nub in the right are cap refill less than 3 seconds    Pt will make his appointment with the office.  He works there and will make the appointment    All of the pt belongings were returned to him and accounted for

## 2020-09-14 NOTE — PROGRESS NOTES
[facility]    Subjective:     Chief Complaint:   Chief Complaint   Patient presents with    Right Wrist - Injury     Fx Rt  Distal Radius- DOI 9/12/2020       Past Medical History:   Diagnosis Date    AR (allergic rhinitis)     KATELYNN (obstructive sleep apnea)     PPD positive, treated     Skin tag     Right upper eyelid       Past Surgical History:   Procedure Laterality Date    APPENDECTOMY      ROTATOR CUFF REPAIR Right     SEPTORHINOPLASTY      ULNAR NERVE TRANSPOSITION         Current Outpatient Medications   Medication Sig    pantoprazole (PROTONIX) 40 MG tablet TAKE 1 TABLET DAILY    verapamil (VERELAN) 120 MG C24P Take 1 capsule (120 mg total) by mouth once daily.     No current facility-administered medications for this visit.        Review of patient's allergies indicates:  No Known Allergies    Family History   Problem Relation Age of Onset    Diabetes Mother     Cancer Maternal Grandmother         breast    Macular degeneration Maternal Grandmother     Amblyopia Neg Hx     Blindness Neg Hx     Cataracts Neg Hx     Glaucoma Neg Hx     Hypertension Neg Hx     Retinal detachment Neg Hx     Strabismus Neg Hx     Stroke Neg Hx     Thyroid disease Neg Hx        Social History     Socioeconomic History    Marital status:      Spouse name: Lauryn    Number of children: 3    Years of education: 6    Highest education level: Not on file   Occupational History    Occupation: Physician Assistant     Employer: [employer]     Comment: [employer]   Social Needs    Financial resource strain: Not very hard    Food insecurity     Worry: Never true     Inability: Never true    Transportation needs     Medical: No     Non-medical: No   Tobacco Use    Smoking status: Never Smoker    Smokeless tobacco: Never Used   Substance and Sexual Activity    Alcohol use: Yes     Frequency: Monthly or less     Drinks per session: 1 or 2     Binge frequency: Never     Comment:  Occasional    Drug use: No    Sexual activity: Not on file   Lifestyle    Physical activity     Days per week: 5 days     Minutes per session: Not on file    Stress: To some extent   Relationships    Social connections     Talks on phone: Twice a week     Gets together: Never     Attends Taoist service: Not on file     Active member of club or organization: No     Attends meetings of clubs or organizations: Never     Relationship status:    Other Topics Concern    Not on file   Social History Narrative    Not on file       History of present illness:  He had a parachuting injury over the weekend doing [employer] duty.  He came down awkwardly on the landing and injured his buttocks his right knee and specifically his right wrist.  He did notice the back and right knee much until the next day the risk clearly was injure that day an x-ray done splinted and now he is here for follow-up.  Main injuries right hand and wrist but has some mild pain right knee and mild back pain no leg pain    Review of Systems:    Constitution: Negative for chills, fever, and sweats.  Negative for unexplained weight loss.    HENT:  Negative for headaches and blurry vision.    Cardiovascular:Negative for chest pain or irregular heart beat. Negative for hypertension.    Respiratory:  Negative for cough and shortness of breath.    Gastrointestinal: Negative for abdominal pain, heartburn, melena, nausea, and vomitting.    Genitourinary:  Negative bladder incontinence and dysuria.    Musculoskeletal:  See HPI for details.     Neurological: Negative for numbness.    Psychiatric/Behavioral: Negative for depression.  The patient is not nervous/anxious.      Endocrine: Negative for polyuria    Hematologic/Lymphatic: Negative for bleeding problem.  Does not bruise/bleed easily.    Skin: Negative for poor would healing and rash    Objective:      Physical Examination:    Vital Signs:  There were no vitals filed for this  visit.    There is no height or weight on file to calculate BMI.    This a well-developed, well nourished patient in no acute distress.  They are alert and oriented and cooperative to examination.        So physical exam shows he has no acute distress.  Walks normally.  Right knee has no swelling full range of motion no drawer no abduction or 80 duction stress pain or instability medial Kiki is negative minimal tenderness along the medial joint line no effusion.  Lumbar some vague tenderness lower lumbar right and left.  Minimally tender and midline.  Negative straight leg raise bilateral right wrist has moderate swelling.  He has tenderness in the distal radius.  Fingers are working well no median nerve symptoms.  No obvious deformity but clearly tenderness distal radius  Pertinent New Results:    XRAY Report / Interpretation:   AP lateral lumbar is unremarkable no acute findings.  Disc spaces well maintained and aligned.  AP lateral sunrise right knee is unremarkable.  There is no acute findings nor any significant degenerative changes.  AP lateral oblique right wrist shows that there is a acute distal radius fracture.  There is minimal comminution is probably 1 fragment on the ulnar side that does communicate to the joint but the majority of the joint surface is 1 big distal fragment including the volar section metaphyseal fracture on AP is very nicely aligned.  On lateral there is loss of volar tilt there is more minimal of dorsal displacement and there is no major comminution dorsally.  Overall position is neutral volar tilt with no loss of length.  Electronically Signed By Malvin Hernández JR, MD    Assessment/Plan:      So my impression I think he has a lumbar sprain he has a minimal sprain of the right knee.  I do not think either of those are of significance so far he has continued pain down the road with the right knee would do an MRI.  The right wrist is minimally displaced.  We talked about treatment  options I not convinced that he needs a volar plate if we did trip the operating room we would start with the percutaneous pinning obliquely from the radial styloid which would keep him with our positive volar till we could improve the position a little bit and maintain that to the pins come out so this would be a percutaneous ORIF do not know that a plate would be required.  This would improve the position although the position is only minimally displaced as is.  He is going to think about the treatment he wants.  He has a splint temporary splint on if he wants to continue with close treatment that I would put a more definitive sugar-tong splint or of pain case splint on his right wrist today.      This note was created using Dragon voice recognition software that occasionally misinterpreted phrases or words.

## 2020-09-14 NOTE — OP NOTE
[facility]  Surgery Department  Operative Note    SUMMARY     Date of Procedure: 9/14/2020     Procedure: Procedure(s) (LRB):  CLOSED REDUCTION, WRIST, WITH PERCUTANEOUS PINNING (Right)     Surgeon(s) and Role:     * Malvin Hernández Jr., MD - Primary    Assisting Surgeon: None    Pre-Operative Diagnosis: Other closed extra-articular fracture of distal end of right radius, initial encounter [S52.234U]    Post-Operative Diagnosis: Post-Op Diagnosis Codes:     * Other closed extra-articular fracture of distal end of right radius, initial encounter [S52.162M]    Anesthesia: General    Technical Procedures Used: closed reduct  Perc pin    Description of the Findings of the Procedure:  Patient taken to the operating room placed under general anesthesia.  Right wrist prepped draped satisfactory manner.  We then did a close reduction and I could feel it has changed position he had lost his of volar tilt so were able with the of flexion and distraction was able to get the volar tilt back I then used the to the K-wires percutaneously from the radial styloid across into the far cortex 1 was a 2.01 was a 1.6 mm smooth K-wires obliquely and placed them across the fracture and we got an x-ray which showed that the fracture was reduced very nicely we now had regained our volar tilt toes basically an anatomic reduction and the hardware looked appropriate.  We then cut the pins off and bent them left him outside the skin.  We then applied some Betadine and 4x4s around the pins and then applied a volar splint he was then reversed from anesthesia        Complications none    Estimated Blood Loss (EBL): 0 No values recorded between 9/14/2020  3:54 PM and 9/14/2020  4:14 PM *           Implants:   Implant Name Type Inv. Item Serial No.  Lot No. LRB No. Used Action   WIRE K 1.6MM - [id number]  WIRE K 1.6MM  [employer]  Right 1 Implanted   WIRE K 1.6MM - [id number]  WIRE K 1.6MM  [employer]  Right 1  Implanted and Explanted       Specimens:   Specimen (12h ago, onward)    None                  Condition: Good    Disposition: PACU - hemodynamically stable.    Attestation: I was present and scrubbed for the entire procedure.

## 2020-09-14 NOTE — BRIEF OP NOTE
[facility]  Brief Operative Note     SUMMARY     Surgery Date: 9/14/2020     Surgeon(s) and Role:     * Malvin Hernández Jr., MD - Primary    Assisting Surgeon: None    Pre-op Diagnosis:  Other closed extra-articular fracture of distal end of right radius, initial encounter [S52.551A]    Post-op Diagnosis:  Post-Op Diagnosis Codes:     * Other closed extra-articular fracture of distal end of right radius, initial encounter [S52.551A]    Procedure(s) (LRB):  CLOSED REDUCTION, WRIST, WITH PERCUTANEOUS PINNING (Right)    Anesthesia: General    Description of the findings of the procedure:  2 pins    Estimated Blood Nend0Do values recorded between 9/14/2020  3:54 PM and 9/14/2020  4:14 PM *         Specimens:   Specimen (12h ago, onward)    None          Discharge Note    SUMMARY     Admit Date: 9/14/2020    Discharge Date and Time:  09/14/2020 4:24 PM    Hospital Course (synopsis of major diagnoses, care, treatment, and services provided during the course of the hospital stay): Uneventful Outpatient Surgery     Final Diagnosis: Post-Op Diagnosis Codes:     * Other closed extra-articular fracture of distal end of right radius, initial encounter [S52.551A]    Disposition: Home or Self Care    Follow Up/Patient Instructions:     Medications:  Reconciled Home Medications:   Current Discharge Medication List      CONTINUE these medications which have NOT CHANGED    Details   acetaminophen (TYLENOL) 500 MG tablet Take 1,000 mg by mouth every 6 (six) hours as needed for Pain.      pantoprazole (PROTONIX) 40 MG tablet TAKE 1 TABLET DAILY  Qty: 90 tablet, Refills: 3    Comments: Please inactivate all prior scripts with same name and strength including on holds.      verapamil (VERELAN) 120 MG C24P Take 1 capsule (120 mg total) by mouth once daily.  Qty: 90 capsule, Refills: 3      HYDROcodone-acetaminophen (NORCO) 5-325 mg per tablet Take 1 tablet by mouth every 6 (six) hours as needed for Pain.  Qty: 28  tablet, Refills: 0    Comments: Quantity prescribed more than 7 day supply? No  Associated Diagnoses: Other closed extra-articular fracture of distal end of right radius, initial encounter; Sprain of other ligament of right knee, initial encounter; Lumbar sprain, initial encounter           Discharge Procedure Orders   [facility] FOR HOME USE     Diet general     Keep surgical extremity elevated     Ice to affected area     No driving, operating heavy equipment or signing legal documents while taking pain medication.     Call MD for:  temperature >100.4     Call MD for:  persistent nausea and vomiting     Call MD for:  severe uncontrolled pain     Call MD for:  difficulty breathing, headache or visual disturbances     Call MD for:  redness, tenderness, or signs of infection (pain, swelling, redness, odor or green/yellow discharge around incision site)     Call MD for:  hives     Call MD for:  persistent dizziness or light-headedness     Call MD for:  extreme fatigue     Leave dressing on - Keep it clean, dry, and intact until clinic visit     Follow-up Information     Malvin Hernández Jr, MD In 2 weeks.    Specialties: Orthopedic Surgery, Surgery  Contact information:  [address] [facility]  [address] [place] [id number]  [phone]

## 2020-09-15 NOTE — ANESTHESIA POSTPROCEDURE EVALUATION
Anesthesia Post Evaluation    Patient: Hany Caba    Procedure(s) Performed: Procedure(s) (LRB):  CLOSED REDUCTION, WRIST, WITH PERCUTANEOUS PINNING (Right)    Final Anesthesia Type: general    Patient location during evaluation: PACU  Patient participation: Yes- Able to Participate  Level of consciousness: awake and alert  Post-procedure vital signs: reviewed and stable  Pain management: adequate  Airway patency: patent    PONV status at discharge: No PONV  Anesthetic complications: no      Cardiovascular status: hemodynamically stable  Respiratory status: unassisted and room air  Hydration status: euvolemic  Follow-up not needed.          Vitals Value Taken Time   /63 09/14/20 1735   Temp 36.3 °C (97.3 °F) 09/14/20 1735   Pulse 60 09/14/20 1740   Resp 12 09/14/20 1740   SpO2 96 % 09/14/20 1740         Event Time   Out of Recovery 17:25:00         Pain/Salbador Score: Pain Rating Prior to Med Admin: 0 (9/14/2020  4:50 PM)  Salbador Score: 10 (9/14/2020  6:15 PM)

## 2020-09-15 NOTE — ANESTHESIA PROCEDURE NOTES
Peripheral Block    Patient location during procedure: pre-op   Block not for primary anesthetic.  Reason for block: at surgeon's request and post-op pain management   Post-op Pain Location: right wrist   Start time: 9/14/2020 3:15 PM  Timeout: 9/14/2020 3:15 PM   End time: 9/14/2020 3:21 PM    Staffing  Authorizing Provider: Cabrera Post MD  Performing Provider: Cabrera Post MD    Preanesthetic Checklist  Completed: patient identified, site marked, surgical consent, pre-op evaluation, timeout performed, IV checked, risks and benefits discussed and monitors and equipment checked  Peripheral Block  Patient position: supine  Prep: ChloraPrep  Patient monitoring: heart rate, cardiac monitor, continuous pulse ox, continuous capnometry and frequent blood pressure checks  Block type: supraclavicular  Laterality: right  Injection technique: single shot  Needle  Needle type: Stimuplex   Needle gauge: 22 G  Needle length: 2 in  Needle localization: anatomical landmarks and ultrasound guidance   -ultrasound image captured on disc.  Assessment  Injection assessment: negative aspiration, negative parasthesia and local visualized surrounding nerve  Paresthesia pain: none  Heart rate change: no  Slow fractionated injection: yes  Additional Notes  VSS.  [facility] RN monitoring vitals throughout procedure.  Patient tolerated procedure well.

## 2020-09-28 ENCOUNTER — OFFICE VISIT (OUTPATIENT)
Dept: ORTHOPEDICS | Facility: CLINIC | Age: 45
End: 2020-09-28
Payer: OTHER GOVERNMENT

## 2020-09-28 ENCOUNTER — LAB VISIT (OUTPATIENT)
Dept: LAB | Facility: HOSPITAL | Age: 45
End: 2020-09-28
Attending: ORTHOPAEDIC SURGERY
Payer: OTHER GOVERNMENT

## 2020-09-28 VITALS
WEIGHT: 216 LBS | BODY MASS INDEX: 28.63 KG/M2 | DIASTOLIC BLOOD PRESSURE: 84 MMHG | SYSTOLIC BLOOD PRESSURE: 126 MMHG | HEIGHT: 73 IN

## 2020-09-28 DIAGNOSIS — M25.561 ACUTE PAIN OF RIGHT KNEE: ICD-10-CM
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
## 2020-09-28 DIAGNOSIS — S52.551A CLOSED EXTRA-ARTICULAR FRACTURE OF DISTAL END OF RIGHT RADIUS, INITIAL ENCOUNTER: Primary | ICD-10-CM

## 2020-09-28 DIAGNOSIS — Z94.9: ICD-10-CM

## 2020-09-28 DIAGNOSIS — S52.551A CLOSED EXTRAARTICULAR FRACTURE OF DISTAL RADIUS, RIGHT, INITIAL ENCOUNTER: ICD-10-CM

## 2020-09-28 DIAGNOSIS — Z94.9 UNSPECIFIED ORGAN OR TISSUE REPLACED BY TRANSPLANT: Primary | ICD-10-CM

## 2020-09-28 PROCEDURE — 99024 PR POST-OP FOLLOW-UP VISIT: ICD-10-PCS | Mod: S$GLB,,, | Performed by: ORTHOPAEDIC SURGERY

## 2020-09-28 PROCEDURE — 87102 FUNGUS ISOLATION CULTURE: CPT

## 2020-09-28 PROCEDURE — 87070 CULTURE OTHR SPECIMN AEROBIC: CPT

## 2020-09-28 PROCEDURE — 99024 POSTOP FOLLOW-UP VISIT: CPT | Mod: S$GLB,,, | Performed by: ORTHOPAEDIC SURGERY

## 2020-09-28 PROCEDURE — 87075 CULTR BACTERIA EXCEPT BLOOD: CPT

## 2020-09-28 NOTE — PROGRESS NOTES
[facility] ORTHOPEDICS POST-OP NOTE    Subjective:           Chief Complaint:   Chief Complaint   Patient presents with    Right Wrist - Post-op Evaluation     Perc Pinning Rt Wrist 9/14/2020. Increase in pain started last Thursday. Wound has drainage       Past Medical History:   Diagnosis Date    AR (allergic rhinitis)     KATELYNN (obstructive sleep apnea)     PPD positive, treated     Skin tag     Right upper eyelid       Past Surgical History:   Procedure Laterality Date    APPENDECTOMY      CLOSED REDUCTION AND PERCUTANEOUS PINNING (CRPP) OF WRIST Right 9/14/2020    Procedure: CLOSED REDUCTION, WRIST, WITH PERCUTANEOUS PINNING;  Surgeon: Malvin Hernández Jr., MD;  Location[facility];  Service: Orthopedics;  Laterality: Right;    ROTATOR CUFF REPAIR Right     SEPTORHINOPLASTY      ULNAR NERVE TRANSPOSITION         Current Outpatient Medications   Medication Sig    acetaminophen (TYLENOL) 500 MG tablet Take 1,000 mg by mouth every 6 (six) hours as needed for Pain.    pantoprazole (PROTONIX) 40 MG tablet TAKE 1 TABLET DAILY    verapamil (VERELAN) 120 MG C24P Take 1 capsule (120 mg total) by mouth once daily.     No current facility-administered medications for this visit.        Review of patient's allergies indicates:  No Known Allergies    Family History   Problem Relation Age of Onset    Diabetes Mother     Cancer Maternal Grandmother         breast    Macular degeneration Maternal Grandmother     Amblyopia Neg Hx     Blindness Neg Hx     Cataracts Neg Hx     Glaucoma Neg Hx     Hypertension Neg Hx     Retinal detachment Neg Hx     Strabismus Neg Hx     Stroke Neg Hx     Thyroid disease Neg Hx        Social History     Socioeconomic History    Marital status:      Spouse name: Lauryn    Number of children: 3    Years of education: 6    Highest education level: Not on file   Occupational History    Occupation: Physician Assistant     Employer: [employer]     Comment: [employer]    Social Needs    Financial resource strain: Not very hard    Food insecurity     Worry: Never true     Inability: Never true    Transportation needs     Medical: No     Non-medical: No   Tobacco Use    Smoking status: Never Smoker    Smokeless tobacco: Never Used   Substance and Sexual Activity    Alcohol use: Yes     Frequency: Monthly or less     Drinks per session: 1 or 2     Binge frequency: Never     Comment: Occasional    Drug use: No    Sexual activity: Yes   Lifestyle    Physical activity     Days per week: 5 days     Minutes per session: Not on file    Stress: To some extent   Relationships    Social connections     Talks on phone: Twice a week     Gets together: Never     Attends Yazidism service: Not on file     Active member of club or organization: No     Attends meetings of clubs or organizations: Never     Relationship status:    Other Topics Concern    Not on file   Social History Narrative    Not on file       History of present illness:  So he is 2 weeks follow-up of percutaneous pin distal radius fracture.  It is a little irritation at the pin site.    Review of Systems:    Musculoskeletal:  See HPI      Objective:        Physical Examination:    Vital Signs:    Vitals:    09/28/20 0755   BP: 126/84       Body mass index is 28.5 kg/m².    This a well-developed, well nourished patient in no acute distress.  They are alert and oriented and cooperative to examination.        Physical exam shows that the is a little drainage from the pins at the radial styloid.  We took culture.  He already started on Bactrim.  One of the pain is probably aggravating the scans of pulled back a few mm to give him a little leeway clean the pins with x-ray AP lateral oblique of the left wrist and shows that the he is 2 pins obliquely across the distal radius fracture the distal radius fractures anatomically reduced reduced now we now have a plenty of positive volar tilt a length looks good.   Compared to the preoperative picture where we had the loss of the volar tilt  XRAY Report / Interpretation:   AP lateral oblique left wrist shows the 2 pins obliquely across the distal radius fracture. No comminution.  Fracture was out to length. We have of a positive volar tilt normally compared to the preoperative picture which had a negative volar tilt.  No significant callus formation yet.  Electronically Signed By Malvin Hernández JR, MD    Assessment/Plan:      The plan going to do some pain cleaning Bactrim culture pulled the pin back slightly like to get 1-2 more weeks as the pins. Temporary splint can be removed daily.    This note was created using Dragon voice recognition software that occasionally misinterpreted phrases or words.

## 2020-09-30 LAB — BACTERIA SPEC AEROBE CULT: NORMAL

## 2020-10-02 LAB — BACTERIA SPEC ANAEROBE CULT: NORMAL

## 2020-10-12 ENCOUNTER — PATIENT MESSAGE (OUTPATIENT)
Dept: FAMILY MEDICINE | Facility: CLINIC | Age: 45
End: 2020-10-12

## 2020-10-12 DIAGNOSIS — G89.29 CHRONIC BILATERAL LOW BACK PAIN, UNSPECIFIED WHETHER SCIATICA PRESENT: ICD-10-CM

## 2020-10-12 DIAGNOSIS — M54.50 CHRONIC BILATERAL LOW BACK PAIN, UNSPECIFIED WHETHER SCIATICA PRESENT: ICD-10-CM

## 2020-10-12 DIAGNOSIS — G89.29 CHRONIC PAIN OF RIGHT KNEE: Primary | ICD-10-CM

## 2020-10-12 DIAGNOSIS — M25.561 CHRONIC PAIN OF RIGHT KNEE: Primary | ICD-10-CM

## 2020-10-13 ENCOUNTER — OFFICE VISIT (OUTPATIENT)
Dept: ORTHOPEDICS | Facility: CLINIC | Age: 45
End: 2020-10-13
Payer: OTHER GOVERNMENT

## 2020-10-13 VITALS
HEART RATE: 68 BPM | SYSTOLIC BLOOD PRESSURE: 124 MMHG | DIASTOLIC BLOOD PRESSURE: 66 MMHG | HEIGHT: 73 IN | WEIGHT: 216 LBS | BODY MASS INDEX: 28.63 KG/M2

## 2020-10-13 DIAGNOSIS — Z98.890 S/P WRIST SURGERY: Primary | ICD-10-CM
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
## 2020-10-13 DIAGNOSIS — S52.551A CLOSED EXTRA-ARTICULAR FRACTURE OF DISTAL END OF RIGHT RADIUS, INITIAL ENCOUNTER: ICD-10-CM

## 2020-10-13 PROCEDURE — 99024 PR POST-OP FOLLOW-UP VISIT: ICD-10-PCS | Mod: S$GLB,,, | Performed by: ORTHOPAEDIC SURGERY

## 2020-10-13 PROCEDURE — 99024 POSTOP FOLLOW-UP VISIT: CPT | Mod: S$GLB,,, | Performed by: ORTHOPAEDIC SURGERY

## 2020-10-13 NOTE — PROGRESS NOTES
[facility] ORTHOPEDICS POST-OP NOTE    Subjective:           Chief Complaint:   Chief Complaint   Patient presents with    Right Wrist - Post-op Evaluation     Perc Pinning Rt Wrist 9/14/2020       Past Medical History:   Diagnosis Date    AR (allergic rhinitis)     KATELYNN (obstructive sleep apnea)     PPD positive, treated     Skin tag     Right upper eyelid       Past Surgical History:   Procedure Laterality Date    APPENDECTOMY  1998    Dr. Pacheco    ARTHROSCOPY OF KNEE Left 01/2020    Dr. Diaz[facility]    CLOSED REDUCTION AND PERCUTANEOUS PINNING (CRPP) OF WRIST Right 9/14/2020    Procedure: CLOSED REDUCTION, WRIST, WITH PERCUTANEOUS PINNING;  Surgeon: Malvin Hernández Jr., MD;  [facility];  Service: Orthopedics;  Laterality: Right;    FINGER TENDON REPAIR Right     Dr. Marcus, [facility] Rupt[facility]    REPAIR OF TORSION OF TESTICLE Left     Dr. Pickens, childhood    ROTATOR CUFF REPAIR Right 04/2018    Yakov Osorio / SAD with distal clavicle resection without RTC repair    ROTATOR CUFF REPAIR Left 07/2016    Yakov Osorio / RTC repair with SAD and distal clavicle resection    SEPTORHINOPLASTY  2006    Dr. Cunningham, [facility]    SEPTORHINOPLASTY  2003    Efrain Morales /  with Cautery assisted palate stiffening    ULNAR NERVE TRANSPOSITION Right 1998    Dr. Nunn, [facility]       Current Outpatient Medications   Medication Sig    pantoprazole (PROTONIX) 40 MG tablet TAKE 1 TABLET DAILY    verapamil (VERELAN) 120 MG C24P Take 1 capsule (120 mg total) by mouth once daily.    acetaminophen (TYLENOL) 500 MG tablet Take 1,000 mg by mouth every 6 (six) hours as needed for Pain.     No current facility-administered medications for this visit.        Review of patient's allergies indicates:  No Known Allergies    Family History   Problem Relation Age of Onset    Diabetes Mother     COPD Father     Cancer Maternal Grandmother         breast    Macular  degeneration Maternal Grandmother     Amblyopia Neg Hx     Blindness Neg Hx     Cataracts Neg Hx     Glaucoma Neg Hx     Hypertension Neg Hx     Retinal detachment Neg Hx     Strabismus Neg Hx     Stroke Neg Hx     Thyroid disease Neg Hx        Social History     Socioeconomic History    Marital status:      Spouse name: Lauryn    Number of children: 3    Years of education: 6    Highest education level: Master's degree (e.g., MA, MS, Rommel, MEd, MSW, MARIZA)   Occupational History    Occupation: Physician Assistant     Employer: [employer]     Comment: [employer]    Occupation: [employer]     Employer: [employer]     Comment: [employer]   Social Needs    Financial resource strain: Not very hard    Food insecurity     Worry: Never true     Inability: Never true    Transportation needs     Medical: No     Non-medical: No   Tobacco Use    Smoking status: Never Smoker    Smokeless tobacco: Never Used   Substance and Sexual Activity    Alcohol use: Yes     Frequency: Monthly or less     Drinks per session: 1 or 2     Binge frequency: Never     Comment: Occasional    Drug use: No    Sexual activity: Yes     Partners: Female     Birth control/protection: Surgical   Lifestyle    Physical activity     Days per week: 5 days     Minutes per session: Not on file    Stress: To some extent   Relationships    Social connections     Talks on phone: Twice a week     Gets together: Never     Attends Church service: Not on file     Active member of club or organization: No     Attends meetings of clubs or organizations: Never     Relationship status:    Other Topics Concern    Not on file   Social History Narrative    Not on file       History of present illness:  Patient returns clinic today for the right wrist, he had a distal radius fracture after a parachute injury with the [employer] was September 12th.  He had percutaneous pinning for stabilization of  the distal radius fracture which happened with the 14th.  He is doing well, he has minimal discomfort from the pins.      Review of Systems:    Musculoskeletal:  See HPI      Objective:        Physical Examination:    Vital Signs:    Vitals:    10/13/20 0833   BP: 124/66   Pulse: 68       Body mass index is 28.5 kg/m².    This a well-developed, well nourished patient in no acute distress.  They are alert and oriented and cooperative to examination.        Examination of the right wrist, 2 percutaneous pins are noted.  Minimal dried drainage noted around the pin site  No signs symptoms of infection.    Pertinent New Results:    XRAY Report / Interpretation:   AP and lateral views of the right wrist taken today in the office showed 2 percutaneous pinned, with a healing distal radius fracture.  Fracture lines are barely visible at this time.  Good callus formation is seen.    Assessment/Plan:      Stable status post percutaneous pinning of a right distal radius fracture sustained during a parachute operation with .  We removed his pins today, will place him in a removable splint for another 2-4 weeks.  Will check him back in another month with a repeat x-ray.    He continues to have some mild right knee pain and back but due to a change in his insurance, he will need a referral for further workup of the right knee and back.  We are waiting on these from his primary care provider    This note was created using Dragon voice recognition software that occasionally misinterpreted phrases or words.

## 2020-10-26 LAB — FUNGUS SPEC CULT: NORMAL

## 2020-10-27 ENCOUNTER — OFFICE VISIT (OUTPATIENT)
Dept: ORTHOPEDICS | Facility: CLINIC | Age: 45
End: 2020-10-27
Payer: OTHER GOVERNMENT

## 2020-10-27 VITALS
HEART RATE: 75 BPM | HEIGHT: 73 IN | BODY MASS INDEX: 26.9 KG/M2 | SYSTOLIC BLOOD PRESSURE: 120 MMHG | WEIGHT: 203 LBS | DIASTOLIC BLOOD PRESSURE: 82 MMHG
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
## 2020-10-27 DIAGNOSIS — S83.8X1D SPRAIN OF OTHER LIGAMENT OF RIGHT KNEE, SUBSEQUENT ENCOUNTER: ICD-10-CM

## 2020-10-27 DIAGNOSIS — M54.50 LUMBAR PAIN: ICD-10-CM

## 2020-10-27 DIAGNOSIS — Z98.890 S/P WRIST SURGERY: Primary | ICD-10-CM

## 2020-10-27 DIAGNOSIS — S33.5XXD LUMBAR SPRAIN, SUBSEQUENT ENCOUNTER: ICD-10-CM

## 2020-10-27 DIAGNOSIS — S52.551D CLOSED EXTRA-ARTICULAR FRACTURE OF DISTAL END OF RIGHT RADIUS WITH ROUTINE HEALING, SUBSEQUENT ENCOUNTER: ICD-10-CM

## 2020-10-27 PROCEDURE — 99024 PR POST-OP FOLLOW-UP VISIT: ICD-10-PCS | Mod: S$GLB,,, | Performed by: ORTHOPAEDIC SURGERY

## 2020-10-27 PROCEDURE — 99024 POSTOP FOLLOW-UP VISIT: CPT | Mod: S$GLB,,, | Performed by: ORTHOPAEDIC SURGERY

## 2020-10-27 NOTE — PROGRESS NOTES
[facility] ORTHOPEDICS POST-OP NOTE    Subjective:           Chief Complaint:   Chief Complaint   Patient presents with    Right Knee - Pain     Right knee pain x 9.12.2020. Pain is constant , gets worse with kneeling,     Lower Back - Pain     Lower back pain x 9.12.2020 States that his pain is midline lower back. Stays in the back, worse with activity.     Right Wrist - Post-op Evaluation     Right wrist pinning 9.14.2020. States that his wrist is still bothering him, getting better with ROM.        Past Medical History:   Diagnosis Date    AR (allergic rhinitis)     KATELYNN (obstructive sleep apnea)     PPD positive, treated     Skin tag     Right upper eyelid       Past Surgical History:   Procedure Laterality Date    APPENDECTOMY  1998    Dr. Pacheco    ARTHROSCOPY OF KNEE Left 01/2020    Dr. Diaz, [facility] Medial [facility]is[facility]    CLOSED REDUCTION AND PERCUTANEOUS PINNING (CRPP) OF WRIST Right 9/14/2020    Procedure: CLOSED REDUCTION, WRIST, WITH PERCUTANEOUS PINNING;  Surgeon: Malvin Hernández Jr., MD;  Location: [facility];  Service: Orthopedics;  Laterality: Right;    FINGER TENDON REPAIR Right     Dr. Marcus, [facility]    REPAIR OF TORSION OF TESTICLE Left     Dr. Pickens, childhood    ROTATOR CUFF REPAIR Right 04/2018    Yakov Osorio / SAD with distal clavicle resection without RTC repair    ROTATOR CUFF REPAIR Left 07/2016    Yakov Osorio / RTC repair with SAD and distal clavicle resection    SEPTORHINOPLASTY  2006    Dr. Cunningham, [facility]    SEPTORHINOPLASTY  2003    Dr. Snow, [facility] /  with Cautery assisted palate stiffening    ULNAR NERVE TRANSPOSITION Right 1998    Dr. Nunn, [facility]       Current Outpatient Medications   Medication Sig    pantoprazole (PROTONIX) 40 MG tablet TAKE 1 TABLET DAILY    verapamil (VERELAN) 120 MG C24P Take 1 capsule (120 mg total) by mouth once daily.     No current facility-administered medications for this visit.        Review of  patient's allergies indicates:  No Known Allergies    Family History   Problem Relation Age of Onset    Diabetes Mother     COPD Father     Cancer Maternal Grandmother         breast    Macular degeneration Maternal Grandmother     Amblyopia Neg Hx     Blindness Neg Hx     Cataracts Neg Hx     Glaucoma Neg Hx     Hypertension Neg Hx     Retinal detachment Neg Hx     Strabismus Neg Hx     Stroke Neg Hx     Thyroid disease Neg Hx        Social History     Socioeconomic History    Marital status:      Spouse name: Lauryn    Number of children: 3    Years of education: 6    Highest education level: Master's degree (e.g., MA, MS, Rommel, MEd, MSW, MARIZA)   Occupational History    Occupation: Physician Assistant     Employer: [employer]     Comment: [employer]    Occupation: LTC [employer]     Employer: [employer]     Comment: [employer]   Social Needs    Financial resource strain: Not very hard    Food insecurity     Worry: Never true     Inability: Never true    Transportation needs     Medical: No     Non-medical: No   Tobacco Use    Smoking status: Never Smoker    Smokeless tobacco: Never Used   Substance and Sexual Activity    Alcohol use: Yes     Frequency: Monthly or less     Drinks per session: 1 or 2     Binge frequency: Never     Comment: Occasional    Drug use: No    Sexual activity: Yes     Partners: Female     Birth control/protection: Surgical   Lifestyle    Physical activity     Days per week: 5 days     Minutes per session: Not on file    Stress: To some extent   Relationships    Social connections     Talks on phone: Twice a week     Gets together: Never     Attends Presybeterian service: Not on file     Active member of club or organization: No     Attends meetings of clubs or organizations: Never     Relationship status:    Other Topics Concern    Not on file   Social History Narrative    Not on file       History of present  illness:  Patient returns to clinic today for his right wrist, he is status post removal of percutaneous pins from a distal radius fracture.  This was on September 12th he is 6 weeks total term since the injury.  This occurred after and fall during tear she operations with the [employer].    Patient still complains of tenderness over the medial aspect of the right knee, he never had any large knee effusion or swelling but complains of pain along the medial joint line with deep bending stooping kneeling.    Patient also has low back pain, this is localized to the low back, midline over the vertebral bodies.  No radicular pain or symptoms.      Review of Systems:    Musculoskeletal:  See HPI      Objective:        Physical Examination:    Vital Signs:    Vitals:    10/27/20 1510   BP: 120/82   Pulse: 75       Body mass index is 28.5 kg/m².    This a well-developed, well nourished patient in no acute distress.  They are alert and oriented and cooperative to examination.        Examination of the right wrist, the patient's site with appropriate can use pins were located is now well healed.  No signs or symptoms of infection.  He has some mild persistent tenderness over the distal radius, he has limitations in flexion extension and ulnar radial deviation.      The right knee, he has tenderness over the medial joint line, no effusion, mild pain with medial Kiki's.  Stable to anterior-posterior varus valgus stress.    Lumbar spine, normal range of motion flexion extension rotation.  Spurling sign is negative.  EHL is normal, straight leg raise is negative.  He has point tenderness over the spinous processes of L 3/L4.  Pertinent New Results:    XRAY Report / Interpretation:   Two views AP and lateral of the right wrist taken today in the office in demonstrates a well-healing distal radius fracture.    Assessment/Plan:      Status post parachute injury, distal radius fractures now well healed.  In follow-up in 6 weeks,  consider physical therapy at that point if range of motion has not significantly improved.  His right knee, he could certainly have a medial meniscal tear.  We will get MRI of his right knee and see him back with these results.  His low back without findings of significant compression fracture or acute injury.  No radicular symptoms by point tenderness.  Will get an MRI of lumbar spine evaluate for occult compression fracture.  Follow up with these results.    This note was created using Dragon voice recognition software that occasionally misinterpreted phrases or words.

## 2020-10-28 ENCOUNTER — PATIENT MESSAGE (OUTPATIENT)
Dept: FAMILY MEDICINE | Facility: CLINIC | Age: 45
End: 2020-10-28

## 2020-10-28 DIAGNOSIS — G89.29 CHRONIC BILATERAL LOW BACK PAIN, UNSPECIFIED WHETHER SCIATICA PRESENT: Primary | ICD-10-CM

## 2020-10-28 DIAGNOSIS — M54.50 CHRONIC BILATERAL LOW BACK PAIN, UNSPECIFIED WHETHER SCIATICA PRESENT: Primary | ICD-10-CM

## 2020-11-20 ENCOUNTER — HOSPITAL ENCOUNTER (OUTPATIENT)
Dept: RADIOLOGY | Facility: HOSPITAL | Age: 45
Discharge: HOME | End: 2020-11-20
Attending: ORTHOPAEDIC SURGERY
Payer: OTHER GOVERNMENT
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
## 2020-11-20 DIAGNOSIS — S83.8X1D SPRAIN OF OTHER LIGAMENT OF RIGHT KNEE, SUBSEQUENT ENCOUNTER: ICD-10-CM

## 2020-11-20 PROCEDURE — 73721 MRI JNT OF LWR EXTRE W/O DYE: CPT | Mod: TC,PO,RT

## 2020-11-24 DIAGNOSIS — M17.11 PRIMARY OSTEOARTHRITIS OF RIGHT KNEE: Primary | ICD-10-CM

## 2020-11-24 RX ORDER — HYALURONATE SODIUM, STABILIZED 88 MG/4 ML
88 SYRINGE (ML) INTRAARTICULAR ONCE
Qty: 4 ML | Refills: 0 | Status: SHIPPED | OUTPATIENT
Start: 2020-11-24 | End: 2020-11-24

## 2020-12-01 ENCOUNTER — PATIENT MESSAGE (OUTPATIENT)
Dept: OPTOMETRY | Facility: CLINIC | Age: 45
End: 2020-12-01

## 2020-12-16 ENCOUNTER — IMMUNIZATION (OUTPATIENT)
Dept: FAMILY MEDICINE | Facility: CLINIC | Age: 45
End: 2020-12-16
Payer: OTHER GOVERNMENT

## 2020-12-16 DIAGNOSIS — Z23 NEED FOR VACCINATION: ICD-10-CM

## 2020-12-16 PROCEDURE — 91300 COVID-19, MRNA, LNP-S, PF, 30 MCG/0.3 ML DOSE VACCINE: ICD-10-PCS | Mod: S$GLB,,, | Performed by: INTERNAL MEDICINE

## 2020-12-16 PROCEDURE — 91300 COVID-19, MRNA, LNP-S, PF, 30 MCG/0.3 ML DOSE VACCINE: CPT | Mod: S$GLB,,, | Performed by: INTERNAL MEDICINE

## 2020-12-16 PROCEDURE — 0001A COVID-19, MRNA, LNP-S, PF, 30 MCG/0.3 ML DOSE VACCINE: CPT | Mod: S$GLB,,, | Performed by: INTERNAL MEDICINE

## 2020-12-16 PROCEDURE — 0001A COVID-19, MRNA, LNP-S, PF, 30 MCG/0.3 ML DOSE VACCINE: ICD-10-PCS | Mod: S$GLB,,, | Performed by: INTERNAL MEDICINE

## 2021-01-06 ENCOUNTER — IMMUNIZATION (OUTPATIENT)
Dept: FAMILY MEDICINE | Facility: CLINIC | Age: 46
End: 2021-01-06
Payer: OTHER GOVERNMENT

## 2021-01-06 DIAGNOSIS — Z23 NEED FOR VACCINATION: ICD-10-CM

## 2021-01-06 PROCEDURE — 0002A COVID-19, MRNA, LNP-S, PF, 30 MCG/0.3 ML DOSE VACCINE: CPT | Mod: CV19,S$GLB,, | Performed by: INTERNAL MEDICINE

## 2021-01-06 PROCEDURE — 91300 COVID-19, MRNA, LNP-S, PF, 30 MCG/0.3 ML DOSE VACCINE: ICD-10-PCS | Mod: S$GLB,,, | Performed by: INTERNAL MEDICINE

## 2021-01-06 PROCEDURE — 91300 COVID-19, MRNA, LNP-S, PF, 30 MCG/0.3 ML DOSE VACCINE: CPT | Mod: S$GLB,,, | Performed by: INTERNAL MEDICINE

## 2021-01-06 PROCEDURE — 0002A COVID-19, MRNA, LNP-S, PF, 30 MCG/0.3 ML DOSE VACCINE: ICD-10-PCS | Mod: CV19,S$GLB,, | Performed by: INTERNAL MEDICINE

## 2021-03-26 ENCOUNTER — OFFICE VISIT (OUTPATIENT)
Dept: FAMILY MEDICINE | Facility: CLINIC | Age: 46
End: 2021-03-26
Payer: OTHER GOVERNMENT

## 2021-03-26 ENCOUNTER — LAB VISIT (OUTPATIENT)
Dept: LAB | Facility: HOSPITAL | Age: 46
End: 2021-03-26
Attending: PHYSICIAN ASSISTANT
Payer: OTHER GOVERNMENT

## 2021-03-26 VITALS
BODY MASS INDEX: 29.22 KG/M2 | WEIGHT: 220.44 LBS | DIASTOLIC BLOOD PRESSURE: 82 MMHG | OXYGEN SATURATION: 95 % | SYSTOLIC BLOOD PRESSURE: 118 MMHG | HEIGHT: 73 IN | HEART RATE: 105 BPM

## 2021-03-26 DIAGNOSIS — I49.3 PVC (PREMATURE VENTRICULAR CONTRACTION): Primary | ICD-10-CM

## 2021-03-26 DIAGNOSIS — E78.1 HYPERTRIGLYCERIDEMIA: ICD-10-CM

## 2021-03-26 DIAGNOSIS — G47.33 OSA (OBSTRUCTIVE SLEEP APNEA): ICD-10-CM

## 2021-03-26 DIAGNOSIS — L91.8 SKIN TAG: ICD-10-CM

## 2021-03-26 LAB
ALBUMIN SERPL BCP-MCNC: 4 G/DL (ref 3.5–5.2)
ALP SERPL-CCNC: 106 U/L (ref 55–135)
ALT SERPL W/O P-5'-P-CCNC: 28 U/L (ref 10–44)
ANION GAP SERPL CALC-SCNC: 7 MMOL/L (ref 8–16)
AST SERPL-CCNC: 24 U/L (ref 10–40)
BILIRUB SERPL-MCNC: 0.5 MG/DL (ref 0.1–1)
BUN SERPL-MCNC: 16 MG/DL (ref 6–20)
CALCIUM SERPL-MCNC: 9.1 MG/DL (ref 8.7–10.5)
CHLORIDE SERPL-SCNC: 109 MMOL/L (ref 95–110)
CHOLEST SERPL-MCNC: 178 MG/DL (ref 120–199)
CHOLEST/HDLC SERPL: 4 {RATIO} (ref 2–5)
CO2 SERPL-SCNC: 25 MMOL/L (ref 23–29)
CREAT SERPL-MCNC: 1.3 MG/DL (ref 0.5–1.4)
EST. GFR  (AFRICAN AMERICAN): >60 ML/MIN/1.73 M^2
EST. GFR  (NON AFRICAN AMERICAN): >60 ML/MIN/1.73 M^2
GLUCOSE SERPL-MCNC: 90 MG/DL (ref 70–110)
HDLC SERPL-MCNC: 45 MG/DL (ref 40–75)
HDLC SERPL: 25.3 % (ref 20–50)
LDLC SERPL CALC-MCNC: 88.6 MG/DL (ref 63–159)
NONHDLC SERPL-MCNC: 133 MG/DL
POTASSIUM SERPL-SCNC: 4.1 MMOL/L (ref 3.5–5.1)
PROT SERPL-MCNC: 7.5 G/DL (ref 6–8.4)
SODIUM SERPL-SCNC: 141 MMOL/L (ref 136–145)
TRIGL SERPL-MCNC: 222 MG/DL (ref 30–150)

## 2021-03-26 PROCEDURE — 99999 PR PBB SHADOW E&M-EST. PATIENT-LVL III: ICD-10-PCS | Mod: PBBFAC,,, | Performed by: PHYSICIAN ASSISTANT

## 2021-03-26 PROCEDURE — 99213 OFFICE O/P EST LOW 20 MIN: CPT | Mod: PBBFAC,PO | Performed by: PHYSICIAN ASSISTANT

## 2021-03-26 PROCEDURE — 99214 PR OFFICE/OUTPT VISIT, EST, LEVL IV, 30-39 MIN: ICD-10-PCS | Mod: S$PBB,,, | Performed by: PHYSICIAN ASSISTANT

## 2021-03-26 PROCEDURE — 80053 COMPREHEN METABOLIC PANEL: CPT | Performed by: PHYSICIAN ASSISTANT

## 2021-03-26 PROCEDURE — 80061 LIPID PANEL: CPT | Performed by: PHYSICIAN ASSISTANT

## 2021-03-26 PROCEDURE — 36415 COLL VENOUS BLD VENIPUNCTURE: CPT | Mod: PO | Performed by: PHYSICIAN ASSISTANT

## 2021-03-26 PROCEDURE — 99999 PR PBB SHADOW E&M-EST. PATIENT-LVL III: CPT | Mod: PBBFAC,,, | Performed by: PHYSICIAN ASSISTANT

## 2021-03-26 PROCEDURE — 99214 OFFICE O/P EST MOD 30 MIN: CPT | Mod: S$PBB,,, | Performed by: PHYSICIAN ASSISTANT

## 2021-03-26 RX ORDER — PANTOPRAZOLE SODIUM 40 MG/1
40 TABLET, DELAYED RELEASE ORAL DAILY
Qty: 90 TABLET | Refills: 3 | Status: SHIPPED | OUTPATIENT
Start: 2021-03-26 | End: 2022-02-11 | Stop reason: SDUPTHER

## 2021-03-31 ENCOUNTER — OFFICE VISIT (OUTPATIENT)
Dept: DERMATOLOGY | Facility: CLINIC | Age: 46
End: 2021-03-31
Payer: OTHER GOVERNMENT

## 2021-03-31 VITALS — RESPIRATION RATE: 18 BRPM | BODY MASS INDEX: 29.22 KG/M2 | HEIGHT: 73 IN | WEIGHT: 220.44 LBS

## 2021-03-31 DIAGNOSIS — L91.8 SKIN TAG: ICD-10-CM

## 2021-03-31 DIAGNOSIS — L57.0 ACTINIC KERATOSIS: ICD-10-CM

## 2021-03-31 DIAGNOSIS — L91.8 CUTANEOUS SKIN TAGS: ICD-10-CM

## 2021-03-31 DIAGNOSIS — L82.1 SEBORRHEIC KERATOSES: Primary | ICD-10-CM

## 2021-03-31 PROCEDURE — 99213 OFFICE O/P EST LOW 20 MIN: CPT | Mod: PBBFAC,PO,25 | Performed by: DERMATOLOGY

## 2021-03-31 PROCEDURE — 99213 OFFICE O/P EST LOW 20 MIN: CPT | Mod: 25,S$PBB,, | Performed by: DERMATOLOGY

## 2021-03-31 PROCEDURE — 99999 PR PBB SHADOW E&M-EST. PATIENT-LVL III: ICD-10-PCS | Mod: PBBFAC,,, | Performed by: DERMATOLOGY

## 2021-03-31 PROCEDURE — 17000 DESTRUCT PREMALG LESION: CPT | Mod: PBBFAC,PO | Performed by: DERMATOLOGY

## 2021-03-31 PROCEDURE — 17000 DESTRUCT PREMALG LESION: CPT | Mod: S$PBB,,, | Performed by: DERMATOLOGY

## 2021-03-31 PROCEDURE — 17000 PR DESTRUCTION(LASER SURGERY,CRYOSURGERY,CHEMOSURGERY),PREMALIGNANT LESIONS,FIRST LESION: ICD-10-PCS | Mod: S$PBB,,, | Performed by: DERMATOLOGY

## 2021-03-31 PROCEDURE — 99999 PR PBB SHADOW E&M-EST. PATIENT-LVL III: CPT | Mod: PBBFAC,,, | Performed by: DERMATOLOGY

## 2021-03-31 PROCEDURE — 99213 PR OFFICE/OUTPT VISIT, EST, LEVL III, 20-29 MIN: ICD-10-PCS | Mod: 25,S$PBB,, | Performed by: DERMATOLOGY

## 2021-04-07 ENCOUNTER — PATIENT MESSAGE (OUTPATIENT)
Dept: FAMILY MEDICINE | Facility: CLINIC | Age: 46
End: 2021-04-07

## 2021-04-07 DIAGNOSIS — M25.562 CHRONIC PAIN OF BOTH KNEES: Primary | ICD-10-CM

## 2021-04-07 DIAGNOSIS — G89.29 CHRONIC PAIN OF BOTH KNEES: Primary | ICD-10-CM

## 2021-04-07 DIAGNOSIS — M25.561 CHRONIC PAIN OF BOTH KNEES: Primary | ICD-10-CM

## 2021-04-08 ENCOUNTER — TELEPHONE (OUTPATIENT)
Dept: FAMILY MEDICINE | Facility: CLINIC | Age: 46
End: 2021-04-08

## 2022-01-30 ENCOUNTER — PATIENT MESSAGE (OUTPATIENT)
Dept: ADMINISTRATIVE | Facility: OTHER | Age: 47
End: 2022-01-30

## 2022-02-11 ENCOUNTER — OFFICE VISIT (OUTPATIENT)
Dept: FAMILY MEDICINE | Facility: CLINIC | Age: 47
End: 2022-02-11
Payer: OTHER GOVERNMENT

## 2022-02-11 ENCOUNTER — LAB VISIT (OUTPATIENT)
Dept: LAB | Facility: HOSPITAL | Age: 47
End: 2022-02-11
Attending: PHYSICIAN ASSISTANT
Payer: OTHER GOVERNMENT

## 2022-02-11 ENCOUNTER — PATIENT MESSAGE (OUTPATIENT)
Dept: ADMINISTRATIVE | Facility: OTHER | Age: 47
End: 2022-02-11
Payer: OTHER GOVERNMENT

## 2022-02-11 VITALS
DIASTOLIC BLOOD PRESSURE: 82 MMHG | BODY MASS INDEX: 27.46 KG/M2 | SYSTOLIC BLOOD PRESSURE: 108 MMHG | HEART RATE: 62 BPM | OXYGEN SATURATION: 97 % | WEIGHT: 208.13 LBS

## 2022-02-11 DIAGNOSIS — Z12.11 SCREENING FOR COLON CANCER: ICD-10-CM

## 2022-02-11 DIAGNOSIS — I49.3 PVC (PREMATURE VENTRICULAR CONTRACTION): ICD-10-CM

## 2022-02-11 DIAGNOSIS — E78.1 HYPERTRIGLYCERIDEMIA: ICD-10-CM

## 2022-02-11 DIAGNOSIS — K21.9 GASTROESOPHAGEAL REFLUX DISEASE, UNSPECIFIED WHETHER ESOPHAGITIS PRESENT: Primary | ICD-10-CM

## 2022-02-11 DIAGNOSIS — G47.33 OSA (OBSTRUCTIVE SLEEP APNEA): ICD-10-CM

## 2022-02-11 PROCEDURE — 36415 COLL VENOUS BLD VENIPUNCTURE: CPT | Mod: PO | Performed by: PHYSICIAN ASSISTANT

## 2022-02-11 PROCEDURE — 80053 COMPREHEN METABOLIC PANEL: CPT | Performed by: PHYSICIAN ASSISTANT

## 2022-02-11 PROCEDURE — 99214 OFFICE O/P EST MOD 30 MIN: CPT | Mod: S$PBB,,, | Performed by: PHYSICIAN ASSISTANT

## 2022-02-11 PROCEDURE — 80061 LIPID PANEL: CPT | Performed by: PHYSICIAN ASSISTANT

## 2022-02-11 PROCEDURE — 99999 PR PBB SHADOW E&M-EST. PATIENT-LVL III: CPT | Mod: PBBFAC,,, | Performed by: PHYSICIAN ASSISTANT

## 2022-02-11 PROCEDURE — 82274 ASSAY TEST FOR BLOOD FECAL: CPT | Performed by: PHYSICIAN ASSISTANT

## 2022-02-11 PROCEDURE — 99999 PR PBB SHADOW E&M-EST. PATIENT-LVL III: ICD-10-PCS | Mod: PBBFAC,,, | Performed by: PHYSICIAN ASSISTANT

## 2022-02-11 PROCEDURE — 99214 PR OFFICE/OUTPT VISIT, EST, LEVL IV, 30-39 MIN: ICD-10-PCS | Mod: S$PBB,,, | Performed by: PHYSICIAN ASSISTANT

## 2022-02-11 PROCEDURE — 99213 OFFICE O/P EST LOW 20 MIN: CPT | Mod: PBBFAC,PO | Performed by: PHYSICIAN ASSISTANT

## 2022-02-11 RX ORDER — PANTOPRAZOLE SODIUM 40 MG/1
40 TABLET, DELAYED RELEASE ORAL DAILY
Qty: 90 TABLET | Refills: 3 | Status: SHIPPED | OUTPATIENT
Start: 2022-02-11 | End: 2023-06-02 | Stop reason: SDUPTHER

## 2022-02-11 NOTE — PROGRESS NOTES
Subjective:       Patient ID: Hany Caba is a 46 y.o. male       Chief Complaint: Annual Exam and GI consult    HPI  Patient's  PCP: Ariella Cid MD.  The patient's last visit with me was on 3/26/2021  Patient's past medical history includes:  Hypertriglyceridemia, obstructive sleep apnea, premature ventricular contractions and GERD    The patient presents today for annual exam.  He CO having some blood in his stool occasionally and he does not ever feel like he is clean.  He denies any constipation or diarrhea.    Past Medical History:   Diagnosis Date    AR (allergic rhinitis)     KATELYNN (obstructive sleep apnea)     PPD positive, treated     Skin tag     Right upper eyelid       Past Surgical History:   Procedure Laterality Date    APPENDECTOMY  1998    Dr. Pacheco    ARTHROSCOPY OF KNEE Left 01/2020    [facility]    CLOSED REDUCTION AND PERCUTANEOUS PINNING (CRPP) OF WRIST Right 9/14/2020    Procedure: CLOSED REDUCTION, WRIST, WITH PERCUTANEOUS PINNING;  Surgeon: Malvin Hernández Jr., MD;  Location: [facility];  Service: Orthopedics;  Laterality: Right;    FINGER TENDON REPAIR Right     Dr. Marcus, [facility] Rupture    REPAIR OF TORSION OF TESTICLE Left     Dr. Pickens, childhood    ROTATOR CUFF REPAIR Right 04/2018    Yakov Osorio / SAD with distal clavicle resection without RTC repair    ROTATOR CUFF REPAIR Left 07/2016    Yakov Osorio / RTC repair with SAD and distal clavicle resection    SEPTORHINOPLASTY  2006    Dr. Cunningham, [facility]    SEPTORHINOPLASTY  2003    Efrain Morales /  with Cautery assisted palate stiffening    ULNAR NERVE TRANSPOSITION Right 1998    Dr. Nunn, [facility]       Family History   Problem Relation Age of Onset    Diabetes Mother     COPD Father     Cancer Maternal Grandmother         breast    Macular degeneration Maternal Grandmother     Amblyopia Neg Hx     Blindness Neg Hx     Cataracts Neg Hx     Glaucoma  Neg Hx     Hypertension Neg Hx     Retinal detachment Neg Hx     Strabismus Neg Hx     Stroke Neg Hx     Thyroid disease Neg Hx        Social History     Socioeconomic History    Marital status:      Spouse name: Lauryn    Number of children: 3    Years of education: 6    Highest education level: Master's degree (e.g., MA, MS, Rommel, MEd, MSW, MARIZA)   Occupational History    Occupation: Physician Assistant     Employer: [employer]     Comment: [employer]    Occupation: LTC [employer]     Employer: [employer]     Comment: [employer]   Tobacco Use    Smoking status: Never Smoker    Smokeless tobacco: Never Used   Substance and Sexual Activity    Alcohol use: Yes     Comment: Occasional    Drug use: No    Sexual activity: Yes     Partners: Female     Birth control/protection: Surgical     Social Determinants of Health     Financial Resource Strain: Low Risk     Difficulty of Paying Living Expenses: Not very hard   Food Insecurity: No Food Insecurity    Worried About Running Out of Food in the Last Year: Never true    Ran Out of Food in the Last Year: Never true   Transportation Needs: No Transportation Needs    Lack of Transportation (Medical): No    Lack of Transportation (Non-Medical): No   Physical Activity: Sufficiently Active    Days of Exercise per Week: 7 days    Minutes of Exercise per Session: 60 min   Stress: No Stress Concern Present    Feeling of Stress : Only a little   Social Connections: Unknown    Frequency of Communication with Friends and Family: Twice a week    Frequency of Social Gatherings with Friends and Family: Once a week    Active Member of Clubs or Organizations: Yes    Attends Club or Organization Meetings: More than 4 times per year    Marital Status:    Housing Stability: Low Risk     Unable to Pay for Housing in the Last Year: No    Number of Places Lived in the Last Year: 1    Unstable Housing in the Last  Year: No       Current Outpatient Medications   Medication Sig Dispense Refill    verapamiL (VERELAN) 120 MG C24P Take 1 capsule (120 mg total) by mouth once daily. 90 capsule 3    pantoprazole (PROTONIX) 40 MG tablet Take 1 tablet (40 mg total) by mouth once daily. 90 tablet 3     No current facility-administered medications for this visit.       Review of patient's allergies indicates:  No Known Allergies      Review of Systems   Constitutional: Negative for activity change and unexpected weight change.   HENT: Negative for hearing loss, rhinorrhea and trouble swallowing.    Eyes: Negative for discharge and visual disturbance.   Respiratory: Negative for chest tightness and wheezing.    Cardiovascular: Negative for chest pain and palpitations.   Gastrointestinal: Negative for blood in stool, constipation, diarrhea and vomiting.   Endocrine: Negative for polydipsia and polyuria.   Genitourinary: Negative for difficulty urinating, hematuria and urgency.   Musculoskeletal: Negative for arthralgias, joint swelling and neck pain.   Neurological: Negative for weakness and headaches.   Psychiatric/Behavioral: Negative for confusion and dysphoric mood.        Objective:   Physical Exam  Constitutional:       General: He is not in acute distress.     Appearance: He is well-developed and well-nourished.   HENT:      Head: Normocephalic and atraumatic.      Right Ear: External ear normal.      Left Ear: External ear normal.      Nose: Nose normal.      Mouth/Throat:      Mouth: Oropharynx is clear and moist.   Eyes:      Extraocular Movements: EOM normal.      Conjunctiva/sclera: Conjunctivae normal.      Pupils: Pupils are equal, round, and reactive to light.   Neck:      Vascular: No JVD.   Cardiovascular:      Rate and Rhythm: Normal rate and regular rhythm.      Heart sounds: Normal heart sounds. No murmur heard.  No friction rub. No gallop.    Pulmonary:      Effort: Pulmonary effort is normal. No respiratory  distress.      Breath sounds: Normal breath sounds. No wheezing or rales.   Abdominal:      General: Bowel sounds are normal. There is no distension.      Palpations: Abdomen is soft. There is no mass.      Tenderness: There is no abdominal tenderness. There is no guarding.   Musculoskeletal:         General: No edema. Normal range of motion.      Cervical back: Normal range of motion and neck supple.   Skin:     General: Skin is warm and dry.   Neurological:      Mental Status: He is alert and oriented to person, place, and time.   Psychiatric:         Mood and Affect: Mood and affect normal.         Behavior: Behavior normal.         Thought Content: Thought content normal.         Judgment: Judgment normal.          Assessment:       1. Gastroesophageal reflux disease, unspecified whether esophagitis present  pantoprazole (PROTONIX) 40 MG tablet    Ambulatory referral/consult to Gastroenterology   2. PVC (premature ventricular contraction)     3. Hypertriglyceridemia  Lipid Panel    Comprehensive Metabolic Panel   4. KATELYNN (obstructive sleep apnea)     5. Screening for colon cancer  Fecal Immunochemical Test (iFOBT)        Plan:       Gastroesophageal reflux disease, unspecified whether esophagitis present  -     pantoprazole (PROTONIX) 40 MG tablet; Take 1 tablet (40 mg total) by mouth once daily.  Dispense: 90 tablet; Refill: 3  -     Ambulatory referral/consult to Gastroenterology; Future; Expected date: 02/18/2022    PVC (premature ventricular contraction)    Hypertriglyceridemia  -     Lipid Panel; Future; Expected date: 02/11/2022  -     Comprehensive Metabolic Panel; Future; Expected date: 02/11/2022    KATELYNN (obstructive sleep apnea)    Screening for colon cancer  -     Fecal Immunochemical Test (iFOBT); Future; Expected date: 02/11/2022         No follow-ups on file.       Rebeca Dinh PA-C   02/11/2022   8:29 AM

## 2022-02-12 LAB
ALBUMIN SERPL BCP-MCNC: 3.9 G/DL (ref 3.5–5.2)
ALP SERPL-CCNC: 92 U/L (ref 55–135)
ALT SERPL W/O P-5'-P-CCNC: 30 U/L (ref 10–44)
ANION GAP SERPL CALC-SCNC: 12 MMOL/L (ref 8–16)
AST SERPL-CCNC: 34 U/L (ref 10–40)
BILIRUB SERPL-MCNC: 0.4 MG/DL (ref 0.1–1)
BUN SERPL-MCNC: 15 MG/DL (ref 6–20)
CALCIUM SERPL-MCNC: 9.7 MG/DL (ref 8.7–10.5)
CHLORIDE SERPL-SCNC: 105 MMOL/L (ref 95–110)
CHOLEST SERPL-MCNC: 160 MG/DL (ref 120–199)
CHOLEST/HDLC SERPL: 3.2 {RATIO} (ref 2–5)
CO2 SERPL-SCNC: 24 MMOL/L (ref 23–29)
CREAT SERPL-MCNC: 1 MG/DL (ref 0.5–1.4)
EST. GFR  (AFRICAN AMERICAN): >60 ML/MIN/1.73 M^2
EST. GFR  (NON AFRICAN AMERICAN): >60 ML/MIN/1.73 M^2
GLUCOSE SERPL-MCNC: 101 MG/DL (ref 70–110)
HDLC SERPL-MCNC: 50 MG/DL (ref 40–75)
HDLC SERPL: 31.3 % (ref 20–50)
LDLC SERPL CALC-MCNC: 80.6 MG/DL (ref 63–159)
NONHDLC SERPL-MCNC: 110 MG/DL
POTASSIUM SERPL-SCNC: 4.4 MMOL/L (ref 3.5–5.1)
PROT SERPL-MCNC: 7.1 G/DL (ref 6–8.4)
SODIUM SERPL-SCNC: 141 MMOL/L (ref 136–145)
TRIGL SERPL-MCNC: 147 MG/DL (ref 30–150)

## 2022-02-22 LAB — HEMOCCULT STL QL IA: NEGATIVE

## 2022-03-29 ENCOUNTER — PATIENT MESSAGE (OUTPATIENT)
Dept: FAMILY MEDICINE | Facility: CLINIC | Age: 47
End: 2022-03-29
Payer: OTHER GOVERNMENT

## 2022-03-29 DIAGNOSIS — L82.1 SK (SEBORRHEIC KERATOSIS): Primary | ICD-10-CM

## 2022-03-31 ENCOUNTER — PATIENT MESSAGE (OUTPATIENT)
Dept: FAMILY MEDICINE | Facility: CLINIC | Age: 47
End: 2022-03-31
Payer: OTHER GOVERNMENT

## 2022-04-12 ENCOUNTER — PATIENT MESSAGE (OUTPATIENT)
Dept: FAMILY MEDICINE | Facility: CLINIC | Age: 47
End: 2022-04-12
Payer: OTHER GOVERNMENT

## 2022-04-19 ENCOUNTER — OFFICE VISIT (OUTPATIENT)
Dept: ORTHOPEDICS | Facility: CLINIC | Age: 47
End: 2022-04-19
Payer: OTHER GOVERNMENT

## 2022-04-19 VITALS — BODY MASS INDEX: 27.57 KG/M2 | HEIGHT: 73 IN | WEIGHT: 208 LBS

## 2022-04-19 DIAGNOSIS — M17.11 PRIMARY OSTEOARTHRITIS OF RIGHT KNEE: Primary | ICD-10-CM

## 2022-04-19 DIAGNOSIS — M17.12 PRIMARY OSTEOARTHRITIS OF LEFT KNEE: ICD-10-CM

## 2022-04-19 PROCEDURE — 20610 DRAIN/INJ JOINT/BURSA W/O US: CPT | Mod: 50,S$GLB,, | Performed by: ORTHOPAEDIC SURGERY

## 2022-04-19 PROCEDURE — 99499 NO LOS: ICD-10-PCS | Mod: S$GLB,,, | Performed by: ORTHOPAEDIC SURGERY

## 2022-04-19 PROCEDURE — 20610 LARGE JOINT ASPIRATION/INJECTION: BILATERAL KNEE: ICD-10-PCS | Mod: 50,S$GLB,, | Performed by: ORTHOPAEDIC SURGERY

## 2022-04-19 PROCEDURE — 99499 UNLISTED E&M SERVICE: CPT | Mod: S$GLB,,, | Performed by: ORTHOPAEDIC SURGERY

## 2022-04-19 NOTE — PROCEDURES
Large Joint Aspiration/Injection: bilateral knee    Date/Time: 4/19/2022 1:00 PM  Performed by: Reno Morgan MD  Authorized by: Reno Morgan MD     Consent Done?:  Yes (Verbal)  Indications:  Pain  Site marked: the procedure site was marked    Timeout: prior to procedure the correct patient, procedure, and site was verified    Prep: patient was prepped and draped in usual sterile fashion      Local anesthesia used?: Yes    Local anesthetic:  Lidocaine 1% without epinephrine    Details:  Needle Size:  20 G  Ultrasonic Guidance for needle placement?: No    Location:  Knee  Laterality:  Bilateral  Site:  Bilateral knee  Medications (Right):  48 mg hylan g-f 20 48 mg/6 mL  Medications (Left):  48 mg hylan g-f 20 48 mg/6 mL  Patient tolerance:  Patient tolerated the procedure well with no immediate complications

## 2022-04-19 NOTE — PROGRESS NOTES
[facility]    Subjective:     Chief Complaint:   Chief Complaint   Patient presents with    Right Knee - Pain     Patient is here to get b/l knee synvisc one injection.    Left Knee - Pain       Past Medical History:   Diagnosis Date    AR (allergic rhinitis)     KATELYNN (obstructive sleep apnea)     PPD positive, treated     Skin tag     Right upper eyelid       Past Surgical History:   Procedure Laterality Date    APPENDECTOMY  1998    Dr. Pacheco    ARTHROSCOPY OF KNEE Left 01/2020    [facility]    CLOSED REDUCTION AND PERCUTANEOUS PINNING (CRPP) OF WRIST Right 9/14/2020    Procedure: CLOSED REDUCTION, WRIST, WITH PERCUTANEOUS PINNING;  Surgeon: Malvin Hernández Jr., MD;  Location: [facility];  Service: Orthopedics;  Laterality: Right;    FINGER TENDON REPAIR Right     Dr. Marcus, [facility] Rupture    REPAIR OF TORSION OF TESTICLE Left     Dr. Pickens, childhood    ROTATOR CUFF REPAIR Right 04/2018    Yakov Osorio / SAD with distal clavicle resection without RTC repair    ROTATOR CUFF REPAIR Left 07/2016    Yakov Osorio / RTC repair with SAD and distal clavicle resection    SEPTORHINOPLASTY  2006    Dr. Cunningham, [facility]    SEPTORHINOPLASTY  2003    Dr. Snow, [facility] /  with Cautery assisted palate stiffening    ULNAR NERVE TRANSPOSITION Right 1998    Dr. Nunn, [facility]       Current Outpatient Medications   Medication Sig    pantoprazole (PROTONIX) 40 MG tablet Take 1 tablet (40 mg total) by mouth once daily.    verapamiL (VERELAN) 120 MG C24P Take 1 capsule (120 mg total) by mouth once daily.     No current facility-administered medications for this visit.       Review of patient's allergies indicates:  No Known Allergies    Family History   Problem Relation Age of Onset    Diabetes Mother     COPD Father     Cancer Maternal Grandmother         breast    Macular degeneration Maternal Grandmother     Amblyopia Neg Hx     Blindness Neg  Hx     Cataracts Neg Hx     Glaucoma Neg Hx     Hypertension Neg Hx     Retinal detachment Neg Hx     Strabismus Neg Hx     Stroke Neg Hx     Thyroid disease Neg Hx        Social History     Socioeconomic History    Marital status:      Spouse name: Lauryn    Number of children: 3    Years of education: 6    Highest education level: Master's degree (e.g., MA, MS, Rommel, MEd, MSW, MARIZA)   Occupational History    Occupation: Physician Assistant     Employer: [employer]     Comment: [employer]    Occupation: LTC [employer]     Employer: [employer]     Comment: [employer]   Tobacco Use    Smoking status: Never Smoker    Smokeless tobacco: Never Used   Substance and Sexual Activity    Alcohol use: Yes     Comment: Occasional    Drug use: No    Sexual activity: Yes     Partners: Female     Birth control/protection: Surgical     Social Determinants of Health     Financial Resource Strain: Low Risk     Difficulty of Paying Living Expenses: Not very hard   Food Insecurity: No Food Insecurity    Worried About Running Out of Food in the Last Year: Never true    Ran Out of Food in the Last Year: Never true   Transportation Needs: No Transportation Needs    Lack of Transportation (Medical): No    Lack of Transportation (Non-Medical): No   Physical Activity: Sufficiently Active    Days of Exercise per Week: 7 days    Minutes of Exercise per Session: 60 min   Stress: No Stress Concern Present    Feeling of Stress : Only a little   Social Connections: Unknown    Frequency of Communication with Friends and Family: Twice a week    Frequency of Social Gatherings with Friends and Family: Once a week    Active Member of Clubs or Organizations: Yes    Attends Club or Organization Meetings: More than 4 times per year    Marital Status:    Housing Stability: Low Risk     Unable to Pay for Housing in the Last Year: No    Number of Places Lived in the Last  Year: 1    Unstable Housing in the Last Year: No       History of present illness:  Hany comes to the clinic today for follow-up for his bilateral knee osteoarthritis.  He has known longstanding arthritis.  He has history of left knee arthroscopy several years ago by Dr. Waqas Diaz on the [place] where he was found to have areas of grade 3-4 chondromalacia tricompartmentally.  Has had multiple corticosteroid injections since then his temporizing/treatment measures.  He has tried multiple NSAIDs to include over-the-counter preparations with intermittent/partial relief.  He comes to the clinic today for viscosupplementation injections for his bilateral knees.  He has pain intermittently but it does cause symptoms on a daily basis.  Pain is increased with increased activity.  He localizes pain primarily along the medial aspect of his bilateral knees.  He did have injections in his bilateral knees approximately 1 week ago with a  provider of Kenalog and lidocaine with decent relief of his symptoms.  It does interfere with his activities of daily living and inhibits his ability to perform sporting activities with his children.  His knees do occasionally swell and limit his range of motion.    Review of Systems:    Constitution: Negative for chills, fever, and sweats.  Negative for unexplained weight loss.    HENT:  Negative for headaches and blurry vision.    Cardiovascular:Negative for chest pain or irregular heart beat. Negative for hypertension.    Respiratory:  Negative for cough and shortness of breath.    Gastrointestinal: Negative for abdominal pain, heartburn, melena, nausea, and vomitting.    Genitourinary:  Negative bladder incontinence and dysuria.    Musculoskeletal:  See HPI for details.     Neurological: Negative for numbness.    Psychiatric/Behavioral: Negative for depression.  The patient is not nervous/anxious.      Endocrine: Negative for polyuria    Hematologic/Lymphatic: Negative  for bleeding problem.  Does not bruise/bleed easily.    Skin: Negative for poor would healing and rash    Objective:      Physical Examination:    Vital Signs:  There were no vitals filed for this visit.    Body mass index is 27.44 kg/m².    This a well-developed, well nourished patient in no acute distress.  They are alert and oriented and cooperative to examination.        Bilateral knee exam:  Skin to his bilateral knees is clean dry and intact.  There is no erythema or ecchymosis.  There are no signs or symptoms of infection.  Patient is neurovascularly intact throughout bilateral lower extremities.  Bilateral calves are soft and nontender.  Bilateral knee range of motion is approximately 0-130 degrees.  Both knees are stable to varus and valgus stresses while held in extension.  Patient is diffusely tender along the medial aspect of his bilateral knees.  He does have crepitus with range of motioning of his bilateral knees.  He is able weight bear as tolerated on his bilateral lower extremities with a mildly antalgic gait.  He has a negative straight leg raise bilaterally.  He has well-preserved range of motion of bilateral hips with internal/external rotation.    Pertinent New Results:    XRAY Report / Interpretation:   No new images were taken in the clinic today.    Assessment/Plan:      1.  Bilateral knee osteoarthritis, posttraumatic.    I anesthetized the patient's bilateral knees via an anterior lateral approach with 1% lidocaine plain.  I then injected the patient's bilateral knees via an anterior lateral approach with Synvisc-One hyaluronic acid injections.  He tolerated these injections well.  More likely than not, due to the patient's young age and active lifestyle with his  duties, he will have intermittent exacerbations of pain in his knees/flare-ups due to the OA.  He will likely need corticosteroid injections intermittently in the future for relief.  There was also good potential for this  "arthritis to advance and warrant further surgical intervention in the future.  Total knee arthroplasty at some point is a realistic possibility.    Dez Cohen, Physician Assistant, served in the capacity as a "scribe" for this patient encounter.  A "face-to-face" encounter occurred with Dr. Reno Morgan on this date.  The treatment plan and medical decision-making is outlined above. Patient was seen and examined with a chaperone.       This note was created using Dragon voice recognition software that occasionally misinterpreted phrases or words.          "

## 2022-05-06 ENCOUNTER — PATIENT MESSAGE (OUTPATIENT)
Dept: FAMILY MEDICINE | Facility: CLINIC | Age: 47
End: 2022-05-06
Payer: OTHER GOVERNMENT

## 2022-05-09 ENCOUNTER — PATIENT MESSAGE (OUTPATIENT)
Dept: SMOKING CESSATION | Facility: CLINIC | Age: 47
End: 2022-05-09
Payer: OTHER GOVERNMENT

## 2022-06-01 ENCOUNTER — OFFICE VISIT (OUTPATIENT)
Dept: SURGERY | Facility: CLINIC | Age: 47
End: 2022-06-01
Payer: OTHER GOVERNMENT

## 2022-06-01 ENCOUNTER — TELEPHONE (OUTPATIENT)
Dept: SURGERY | Facility: CLINIC | Age: 47
End: 2022-06-01
Payer: OTHER GOVERNMENT

## 2022-06-01 VITALS
TEMPERATURE: 98 F | HEIGHT: 73 IN | SYSTOLIC BLOOD PRESSURE: 124 MMHG | WEIGHT: 211.88 LBS | HEART RATE: 54 BPM | DIASTOLIC BLOOD PRESSURE: 84 MMHG | BODY MASS INDEX: 28.08 KG/M2

## 2022-06-01 DIAGNOSIS — K62.89 IRRITATION OF SKIN OF PERIANAL REGION: Primary | ICD-10-CM

## 2022-06-01 PROCEDURE — 99204 PR OFFICE/OUTPT VISIT, NEW, LEVL IV, 45-59 MIN: ICD-10-PCS | Mod: 25,S$PBB,, | Performed by: SURGERY

## 2022-06-01 PROCEDURE — 99999 PR PBB SHADOW E&M-EST. PATIENT-LVL III: CPT | Mod: PBBFAC,,, | Performed by: SURGERY

## 2022-06-01 PROCEDURE — 46600 DIAGNOSTIC ANOSCOPY SPX: CPT | Mod: PBBFAC,PN | Performed by: SURGERY

## 2022-06-01 PROCEDURE — 99204 OFFICE O/P NEW MOD 45 MIN: CPT | Mod: 25,S$PBB,, | Performed by: SURGERY

## 2022-06-01 PROCEDURE — 46600 DIAGNOSTIC ANOSCOPY SPX: CPT | Mod: S$PBB,,, | Performed by: SURGERY

## 2022-06-01 PROCEDURE — 99213 OFFICE O/P EST LOW 20 MIN: CPT | Mod: 25,PBBFAC,PN | Performed by: SURGERY

## 2022-06-01 PROCEDURE — 46600 PR DIAG2STIC A2SCOPY: ICD-10-PCS | Mod: S$PBB,,, | Performed by: SURGERY

## 2022-06-01 PROCEDURE — 99999 PR PBB SHADOW E&M-EST. PATIENT-LVL III: ICD-10-PCS | Mod: PBBFAC,,, | Performed by: SURGERY

## 2022-06-01 NOTE — TELEPHONE ENCOUNTER
----- Message from Nora Calvillo sent at 6/1/2022  2:52 PM CDT -----  Type: Needs Medical Advice  Who Called:  Patient  Symptoms (please be specific):    How long has patient had these symptoms:    Pharmacy name and phone #:    Best Call Back Number: [phone]  Additional Information: Patient called to inform the office that he will be about 15 min. late.

## 2022-06-01 NOTE — PROGRESS NOTES
Subjective:       Patient ID: Hany Caba is a 46 y.o. male.    Chief Complaint: Anal Itching (Rectal itching and occasional bleeding)    HPI  45 yo M who presents to my office for evaluation of perianal discomfort and itching.  Notes he has had moisture leading to irritation and discomfort for the last several months.  Notes he does have slight occasional bleeding with wiping on occasion.  No changes in bowel habits. No other complaints.  He is otherwise healthy with no significant PMhx or PShx.   Review of Systems   Constitutional: Negative for activity change and appetite change.   Respiratory: Negative for shortness of breath.    Cardiovascular: Negative for chest pain and claudication.   Gastrointestinal: Negative for abdominal distention and abdominal pain.   Musculoskeletal: Negative for arthralgias and back pain.   Hematological: Negative for adenopathy. Does not bruise/bleed easily.   Psychiatric/Behavioral: Negative for agitation and decreased concentration.         Objective:      Physical Exam  Vitals reviewed.   Cardiovascular:      Rate and Rhythm: Normal rate.      Pulses: Normal pulses.   Pulmonary:      Effort: Pulmonary effort is normal. No respiratory distress.      Breath sounds: No wheezing.   Abdominal:      General: Abdomen is flat. Bowel sounds are normal. There is no distension.      Tenderness: There is no abdominal tenderness.      Hernia: No hernia is present.   Genitourinary:     Comments: Ext exam with no significant ext hemorrhoids.  Slight perianal skin irritation.  JEY with normal sphincter tone. Anoscopy with mild to moderate int hemorrhoids in the R ant, R post and L l ateral position .    Neurological:      Mental Status: He is alert.         Assessment:     Perianal irritation   Hemorrhoids   Need for screening cscope  Problem List Items Addressed This Visit    None         Plan:       D/w pt> I have recommended proceeding with cscope.  Regarding his irritation I have  recommended daily fiber supplement and also use of barrier cream( calmoseptine)

## 2022-07-10 ENCOUNTER — PATIENT MESSAGE (OUTPATIENT)
Dept: ADMINISTRATIVE | Facility: OTHER | Age: 47
End: 2022-07-10
Payer: OTHER GOVERNMENT

## 2022-07-20 ENCOUNTER — PATIENT MESSAGE (OUTPATIENT)
Dept: SURGERY | Facility: CLINIC | Age: 47
End: 2022-07-20
Payer: OTHER GOVERNMENT

## 2022-07-20 ENCOUNTER — TELEPHONE (OUTPATIENT)
Dept: SURGERY | Facility: CLINIC | Age: 47
End: 2022-07-20
Payer: OTHER GOVERNMENT

## 2022-07-20 NOTE — TELEPHONE ENCOUNTER
----- Message from Aquiles Whiting sent at 7/20/2022 10:23 AM CDT -----  Type: Needs Medical Advice  Who Called:  pt     Best Call Back Number: [phone]    Additional Information: Pt sts he needs orders for a colonoscopy and to get it scheduled.. Would like a call back when done.  Please advise -- Thank you

## 2022-07-26 ENCOUNTER — TELEPHONE (OUTPATIENT)
Dept: ORTHOPEDICS | Facility: CLINIC | Age: 47
End: 2022-07-26

## 2022-07-26 DIAGNOSIS — T75.3XXA MOTION SICKNESS, INITIAL ENCOUNTER: Primary | ICD-10-CM

## 2022-07-26 RX ORDER — SCOLOPAMINE TRANSDERMAL SYSTEM 1 MG/1
1 PATCH, EXTENDED RELEASE TRANSDERMAL
Qty: 4 PATCH | Refills: 0 | Status: SHIPPED | OUTPATIENT
Start: 2022-07-26 | End: 2022-08-31

## 2022-07-26 NOTE — TELEPHONE ENCOUNTER
Patient is known to me.  He is pending a trip out of the country sailing beginning at the end of this week for a period of approximately 7-10 days.  He does have a history of motion sickness.  We will treat prophylactically with topical scopolamine patches.  Electronic prescription completed today for transderm scope.

## 2022-07-28 ENCOUNTER — PATIENT MESSAGE (OUTPATIENT)
Dept: SURGERY | Facility: CLINIC | Age: 47
End: 2022-07-28
Payer: OTHER GOVERNMENT

## 2022-08-03 ENCOUNTER — PATIENT MESSAGE (OUTPATIENT)
Dept: GASTROENTEROLOGY | Facility: CLINIC | Age: 47
End: 2022-08-03
Payer: OTHER GOVERNMENT

## 2022-08-03 ENCOUNTER — TELEPHONE (OUTPATIENT)
Dept: GASTROENTEROLOGY | Facility: CLINIC | Age: 47
End: 2022-08-03
Payer: OTHER GOVERNMENT

## 2022-08-03 ENCOUNTER — PATIENT MESSAGE (OUTPATIENT)
Dept: SURGERY | Facility: CLINIC | Age: 47
End: 2022-08-03
Payer: OTHER GOVERNMENT

## 2022-08-03 DIAGNOSIS — Z12.11 SCREEN FOR COLON CANCER: Primary | ICD-10-CM

## 2022-08-03 RX ORDER — SODIUM CHLORIDE, SODIUM LACTATE, POTASSIUM CHLORIDE, CALCIUM CHLORIDE 600; 310; 30; 20 MG/100ML; MG/100ML; MG/100ML; MG/100ML
INJECTION, SOLUTION INTRAVENOUS CONTINUOUS
Status: CANCELLED | OUTPATIENT
Start: 2022-08-03

## 2022-08-03 RX ORDER — SODIUM CHLORIDE 0.9 % (FLUSH) 0.9 %
10 SYRINGE (ML) INJECTION
Status: CANCELLED | OUTPATIENT
Start: 2022-08-03

## 2022-08-04 ENCOUNTER — PATIENT MESSAGE (OUTPATIENT)
Dept: GASTROENTEROLOGY | Facility: CLINIC | Age: 47
End: 2022-08-04
Payer: OTHER GOVERNMENT

## 2022-09-02 ENCOUNTER — ANESTHESIA (OUTPATIENT)
Dept: ENDOSCOPY | Facility: HOSPITAL | Age: 47
End: 2022-09-02
Payer: OTHER GOVERNMENT

## 2022-09-02 ENCOUNTER — HOSPITAL ENCOUNTER (OUTPATIENT)
Facility: HOSPITAL | Age: 47
Discharge: HOME | End: 2022-09-02
Attending: SURGERY | Admitting: SURGERY
Payer: OTHER GOVERNMENT

## 2022-09-02 ENCOUNTER — ANESTHESIA EVENT (OUTPATIENT)
Dept: ENDOSCOPY | Facility: HOSPITAL | Age: 47
End: 2022-09-02
Payer: OTHER GOVERNMENT

## 2022-09-02 VITALS
HEIGHT: 73 IN | RESPIRATION RATE: 16 BRPM | OXYGEN SATURATION: 97 % | HEART RATE: 66 BPM | TEMPERATURE: 98 F | WEIGHT: 213 LBS | SYSTOLIC BLOOD PRESSURE: 130 MMHG | DIASTOLIC BLOOD PRESSURE: 81 MMHG | BODY MASS INDEX: 28.23 KG/M2

## 2022-09-02 DIAGNOSIS — Z12.11 SCREEN FOR COLON CANCER: ICD-10-CM

## 2022-09-02 PROCEDURE — 37000009 HC ANESTHESIA EA ADD 15 MINS: Mod: PO | Performed by: SURGERY

## 2022-09-02 PROCEDURE — D9220A PRA ANESTHESIA: ICD-10-PCS | Mod: ANES,,, | Performed by: ANESTHESIOLOGY

## 2022-09-02 PROCEDURE — G0121 COLON CA SCRN NOT HI RSK IND: ICD-10-PCS | Mod: ,,, | Performed by: SURGERY

## 2022-09-02 PROCEDURE — 63600175 PHARM REV CODE 636 W HCPCS: Mod: PO | Performed by: SURGERY

## 2022-09-02 PROCEDURE — G0121 COLON CA SCRN NOT HI RSK IND: HCPCS | Mod: ,,, | Performed by: SURGERY

## 2022-09-02 PROCEDURE — D9220A PRA ANESTHESIA: Mod: CRNA,,, | Performed by: NURSE ANESTHETIST, CERTIFIED REGISTERED

## 2022-09-02 PROCEDURE — 00812 ANES LWR INTST SCR COLSC: CPT | Mod: PO | Performed by: SURGERY

## 2022-09-02 PROCEDURE — 37000008 HC ANESTHESIA 1ST 15 MINUTES: Mod: PO | Performed by: SURGERY

## 2022-09-02 PROCEDURE — D9220A PRA ANESTHESIA: Mod: ANES,,, | Performed by: ANESTHESIOLOGY

## 2022-09-02 PROCEDURE — 63600175 PHARM REV CODE 636 W HCPCS: Mod: PO | Performed by: NURSE ANESTHETIST, CERTIFIED REGISTERED

## 2022-09-02 PROCEDURE — D9220A PRA ANESTHESIA: ICD-10-PCS | Mod: CRNA,,, | Performed by: NURSE ANESTHETIST, CERTIFIED REGISTERED

## 2022-09-02 PROCEDURE — G0121 COLON CA SCRN NOT HI RSK IND: HCPCS | Mod: PO | Performed by: SURGERY

## 2022-09-02 PROCEDURE — 25000003 PHARM REV CODE 250: Mod: PO | Performed by: NURSE ANESTHETIST, CERTIFIED REGISTERED

## 2022-09-02 RX ORDER — LIDOCAINE HCL/PF 100 MG/5ML
SYRINGE (ML) INTRAVENOUS
Status: DISCONTINUED | OUTPATIENT
Start: 2022-09-02 | End: 2022-09-02

## 2022-09-02 RX ORDER — SODIUM CHLORIDE 0.9 % (FLUSH) 0.9 %
10 SYRINGE (ML) INJECTION
Status: DISCONTINUED | OUTPATIENT
Start: 2022-09-02 | End: 2022-09-02 | Stop reason: HOSPADM

## 2022-09-02 RX ORDER — PROPOFOL 10 MG/ML
VIAL (ML) INTRAVENOUS
Status: DISCONTINUED | OUTPATIENT
Start: 2022-09-02 | End: 2022-09-02

## 2022-09-02 RX ORDER — SODIUM CHLORIDE, SODIUM LACTATE, POTASSIUM CHLORIDE, CALCIUM CHLORIDE 600; 310; 30; 20 MG/100ML; MG/100ML; MG/100ML; MG/100ML
INJECTION, SOLUTION INTRAVENOUS CONTINUOUS
Status: DISCONTINUED | OUTPATIENT
Start: 2022-09-02 | End: 2022-09-02 | Stop reason: HOSPADM

## 2022-09-02 RX ADMIN — PROPOFOL 50 MG: 10 INJECTION, EMULSION INTRAVENOUS at 07:09

## 2022-09-02 RX ADMIN — LIDOCAINE HYDROCHLORIDE 100 MG: 20 INJECTION, SOLUTION INTRAVENOUS at 07:09

## 2022-09-02 RX ADMIN — PROPOFOL 175 MG: 10 INJECTION, EMULSION INTRAVENOUS at 07:09

## 2022-09-02 RX ADMIN — SODIUM CHLORIDE, SODIUM LACTATE, POTASSIUM CHLORIDE, AND CALCIUM CHLORIDE: .6; .31; .03; .02 INJECTION, SOLUTION INTRAVENOUS at 06:09

## 2022-09-02 NOTE — TRANSFER OF CARE
"Anesthesia Transfer of Care Note    Patient: Hany Caba    Procedure(s) Performed: Procedure(s) (LRB):  COLONOSCOPY  with banding possible (N/A)    Patient location: PACU    Anesthesia Type: general    Transport from OR: Transported from OR on room air with adequate spontaneous ventilation    Post pain: adequate analgesia    Post assessment: no apparent anesthetic complications and tolerated procedure well    Post vital signs: stable    Level of consciousness: responds to stimulation    Nausea/Vomiting: no nausea/vomiting    Complications: none    Transfer of care protocol was followed      Last vitals:   Visit Vitals  /65 (BP Location: Left arm, Patient Position: Lying)   Pulse 61   Temp 36.8 °C (98.2 °F) (Skin)   Resp 16   Ht 6' 1" (1.854 m)   Wt 96.6 kg (213 lb)   SpO2 95%   BMI 28.10 kg/m²     "

## 2022-09-02 NOTE — ANESTHESIA POSTPROCEDURE EVALUATION
Anesthesia Post Evaluation    Patient: Hany Caba    Procedure(s) Performed: Procedure(s) (LRB):  COLONOSCOPY  with banding possible (N/A)    Final Anesthesia Type: general      Patient location during evaluation: PACU  Patient participation: Yes- Able to Participate  Level of consciousness: awake and alert and oriented  Post-procedure vital signs: reviewed and stable  Pain management: adequate  Airway patency: patent    PONV status at discharge: No PONV  Anesthetic complications: no      Cardiovascular status: blood pressure returned to baseline  Respiratory status: unassisted, spontaneous ventilation and room air  Hydration status: euvolemic  Follow-up not needed.          Vitals Value Taken Time   /81 09/02/22 0740   Temp 36.8 °C (98.2 °F) 09/02/22 0722   Pulse 66 09/02/22 0740   Resp 16 09/02/22 0740   SpO2 97 % 09/02/22 0740         Event Time   Out of Recovery 07:50:00         Pain/Salbador Score: Salbador Score: 10 (9/2/2022  7:45 AM)

## 2022-09-02 NOTE — H&P
[facility] - Endoscopy  History & Physical     Subjective:     Chief Complaint/Reason for Admission: screening colonoscopy    History of Present Illness:   Patient 46 y.o. male presents for screening colonoscopy.  He has never had a cscope.  He denies abd pain.   NO changes in bowel habits.  NO family history of colon or rectal cancer.   .    Patient Active Problem List    Diagnosis Date Noted    Acute pain of right knee 09/28/2020    Injury while parachute jumping 09/28/2020    Closed extraarticular fracture of distal end of right radius 09/14/2020    Lumbar pain on palpation 05/28/2020    PVC (premature ventricular contraction) 02/04/2020    Hypertriglyceridemia 03/05/2012    PPD positive, treated         Medications Prior to Admission   Medication Sig Dispense Refill Last Dose    pantoprazole (PROTONIX) 40 MG tablet Take 1 tablet (40 mg total) by mouth once daily. 90 tablet 3 9/1/2022    verapamiL (VERELAN) 120 MG C24P Take 1 capsule (120 mg total) by mouth once daily. 90 capsule 3 9/1/2022     Review of patient's allergies indicates:  No Known Allergies     Past Medical History:   Diagnosis Date    AR (allergic rhinitis)     Bigeminy     palpitations - controlled with verapamil    KATELYNN (obstructive sleep apnea)     PPD positive, treated     Skin tag     Right upper eyelid      Past Surgical History:   Procedure Laterality Date    APPENDECTOMY  1998    Dr. Pacheco    ARTHROSCOPY OF KNEE Left 01/2020    [facility]    CLOSED REDUCTION AND PERCUTANEOUS PINNING (CRPP) OF WRIST Right 9/14/2020    Procedure: CLOSED REDUCTION, WRIST, WITH PERCUTANEOUS PINNING;  Surgeon: Malvin Hernández Jr., MD;  Location: [facility];  Service: Orthopedics;  Laterality: Right;    FINGER TENDON REPAIR Right     Dr. Marcus, [facility] Rupture    REPAIR OF TORSION OF TESTICLE Left     Dr. Pickens, childhood    ROTATOR CUFF REPAIR Right 04/2018    Dr. Wade, [facility] / DOMINICK with distal clavicle resection without RTC  repair    ROTATOR CUFF REPAIR Left 07/2016    Yakov Osorio / RTC repair with SAD and distal clavicle resection    SEPTORHINOPLASTY  2006    Dr. Cunningham, [facility]    SEPTORHINOPLASTY  2003    Efrain Morales /  with Cautery assisted palate stiffening    ULNAR NERVE TRANSPOSITION Right 1998    Dr. Nunn, [facility]      Family History   Problem Relation Age of Onset    Diabetes Mother     COPD Father     Cancer Maternal Grandmother         breast    Macular degeneration Maternal Grandmother     Amblyopia Neg Hx     Blindness Neg Hx     Cataracts Neg Hx     Glaucoma Neg Hx     Hypertension Neg Hx     Retinal detachment Neg Hx     Strabismus Neg Hx     Stroke Neg Hx     Thyroid disease Neg Hx       Social History     Tobacco Use    Smoking status: Never    Smokeless tobacco: Never   Substance Use Topics    Alcohol use: Yes     Comment: Occasional; 1-2 drinks per month        Review of Systems:  A comprehensive review of systems was negative.    OBJECTIVE:     Patient Vitals for the past 8 hrs:   BP Temp Temp src Pulse Resp SpO2   09/02/22 0635 130/83 98.2 °F (36.8 °C) Skin (!) 59 16 99 %     AAOx3  CTA B  Soft/nt/nd      Data Review:      ASSESSMENT/PLAN:     There are no hospital problems to display for this patient.    Screening colonoscopy   Plan:  To have cscope today

## 2022-09-02 NOTE — ANESTHESIA PREPROCEDURE EVALUATION
09/02/2022  Hany Caba is a 46 y.o., male.    Pre-op Assessment    I have reviewed the Patient Summary Reports.    I have reviewed the Nursing Notes. I have reviewed the NPO Status.   I have reviewed the Medications.     Review of Systems  Anesthesia Hx:  No problems with previous Anesthesia    Social:  Non-Smoker    Hematology/Oncology:  Hematology Normal   Oncology Normal     EENT/Dental:EENT/Dental Normal   Cardiovascular:   Dysrhythmias    Pulmonary:   Sleep Apnea    Renal/:  Renal/ Normal     Hepatic/GI:  Hepatic/GI Normal    Musculoskeletal:   Closed distal radius fracture    Neurological:  Neurology Normal    Endocrine:  Endocrine Normal    Dermatological:  Skin Normal    Psych:  Psychiatric Normal           Physical Exam  General:  Well nourished      Airway/Jaw/Neck:  Airway Findings: Mouth Opening: Normal   Tongue: Normal   General Airway Assessment: Adult Mallampati: II  Improves to II with phonation.  TM Distance: 4-6 cm         Eyes/Ears/Nose:  EYES/EARS/NOSE FINDINGS: Normal   Dental:  Dental Findings: In tact     Chest/Lungs:  Chest/Lungs Findings: Clear to auscultation, Normal Respiratory Rate      Heart/Vascular:  Heart Findings: Rate: Normal  Rhythm: Regular Rhythm  Sounds: Normal  Heart murmur: negative       Mental Status:  Mental Status Findings:  Cooperative, Alert and Oriented         Anesthesia Plan  Type of Anesthesia, risks & benefits discussed:  Anesthesia Type:  general    Patient's Preference:   Plan Factors:          Intra-op Monitoring Plan: standard ASA monitors  Intra-op Monitoring Plan Comments:   Post Op Pain Control Plan: multimodal analgesia, peripheral nerve block and IV/PO Opioids PRN  Post Op Pain Control Plan Comments:     Induction:   IV  Beta Blocker:  Patient is not currently on a Beta-Blocker (No further documentation required).       Informed  Consent: Informed consent signed with the Patient and all parties understand the risks and agree with anesthesia plan.  All questions answered.  Anesthesia consent signed with patient.  ASA Score: 2     Day of Surgery Review of History & Physical: I have interviewed and examined the patient. I have reviewed the patient's H&P dated:              Ready For Surgery From Anesthesia Perspective.           Physical Exam  General: Well nourished    Airway:  Mallampati: II / II  Mouth Opening: Normal  TM Distance: 4-6 cm  Tongue: Normal    Dental:  In tact    Chest/Lungs:  Clear to auscultation, Normal Respiratory Rate    Heart:  Rate: Normal  Rhythm: Regular Rhythm  Sounds: Normal          Anesthesia Plan  Type of Anesthesia, risks & benefits discussed:    Anesthesia Type: general  Intra-op Monitoring Plan: standard ASA monitors  Post Op Pain Control Plan: multimodal analgesia, peripheral nerve block and IV/PO Opioids PRN  Induction:  IV  Informed Consent: Informed consent signed with the Patient and all parties understand the risks and agree with anesthesia plan.  All questions answered.   ASA Score: 2  Day of Surgery Review of History & Physical: I have interviewed and examined the patient. I have reviewed the patient's H&P dated:     Ready For Surgery From Anesthesia Perspective.       .

## 2022-09-02 NOTE — PROVATION PATIENT INSTRUCTIONS
Discharge Summary/Instructions after an Endoscopic Procedure  Patient Name: Hany Caba  Patient MRN: [id number]  Patient YOB: 1975  Friday, September 2, 2022  Fred Solis MD  Dear patient,  As a result of recent federal legislation (The Federal Cures Act), you may   receive lab or pathology results from your procedure in your [facility]   account before your physician is able to contact you. Your physician or   their representative will relay the results to you with their   recommendations at their soonest availability.  Thank you,  RESTRICTIONS:  During your procedure today, you received medications for sedation.  These   medications may affect your judgment, balance and coordination.  Therefore,   for 24 hours, you have the following restrictions:   - DO NOT drive a car, operate machinery, make legal/financial decisions,   sign important papers or drink alcohol.    ACTIVITY:  Today: no heavy lifting, straining or running due to procedural   sedation/anesthesia.  The following day: return to full activity including work.  DIET:  Eat and drink normally unless instructed otherwise.     TREATMENT FOR COMMON SIDE EFFECTS:  - Mild abdominal pain, nausea, belching, bloating or excessive gas:  rest,   eat lightly and use a heating pad.  - Sore Throat: treat with throat lozenges and/or gargle with warm salt   water.  - Because air was used during the procedure, expelling large amounts of air   from your rectum or belching is normal.  - If a bowel prep was taken, you may not have a bowel movement for 1-3 days.    This is normal.  SYMPTOMS TO WATCH FOR AND REPORT TO YOUR PHYSICIAN:  1. Abdominal pain or bloating, other than gas cramps.  2. Chest pain.  3. Back pain.  4. Signs of infection such as: chills or fever occurring within 24 hours   after the procedure.  5. Rectal bleeding, which would show as bright red, maroon, or black stools.   (A tablespoon of blood from the rectum is not serious, especially  if   hemorrhoids are present.)  6. Vomiting.  7. Weakness or dizziness.  GO DIRECTLY TO THE NEAREST EMERGENCY ROOM IF YOU HAVE ANY OF THE FOLLOWING:      Difficulty breathing              Chills and/or fever over 101 F   Persistent vomiting and/or vomiting blood   Severe abdominal pain   Severe chest pain   Black, tarry stools   Bleeding- more than one tablespoon   Any other symptom or condition that you feel may need urgent attention  Your doctor recommends these additional instructions:  If any biopsies were taken, your doctors clinic will contact you in 1 to 2   weeks with any results.  You are being discharged to home.   Resume your regular diet.   Continue your present medications.   We are waiting for your pathology results.   Your physician has recommended a repeat colonoscopy in 10 years for   screening purposes.   Return to my office as needed.  For questions, problems or results please call your physician - Fred Solis MD at Work:  [phone].  EMERGENCY PHONE NUMBER: [phone], LAB RESULTS: [phone]  IF A COMPLICATION OR EMERGENCY SITUATION ARISES AND YOU ARE UNABLE TO REACH   YOUR PHYSICIAN - GO DIRECTLY TO THE EMERGENCY ROOM.  ___________________________________________  Nurse Signature  ___________________________________________  Patient/Designated Responsible Party Signature  Fred Solis MD  9/2/2022 7:21:55 AM  This report has been verified and signed electronically.  Dear patient,  As a result of recent federal legislation (The Federal Cures Act), you may   receive lab or pathology results from your procedure in your [facility]   account before your physician is able to contact you. Your physician or   their representative will relay the results to you with their   recommendations at their soonest availability.  Thank you.  PROVATION

## 2022-10-03 ENCOUNTER — OFFICE VISIT (OUTPATIENT)
Dept: ORTHOPEDICS | Facility: CLINIC | Age: 47
End: 2022-10-03
Payer: OTHER GOVERNMENT

## 2022-10-03 VITALS — BODY MASS INDEX: 28.23 KG/M2 | HEIGHT: 73 IN | WEIGHT: 213 LBS

## 2022-10-03 DIAGNOSIS — M75.102 NONTRAUMATIC TEAR OF LEFT ROTATOR CUFF, UNSPECIFIED TEAR EXTENT: Primary | ICD-10-CM

## 2022-10-03 PROCEDURE — 99213 PR OFFICE/OUTPT VISIT, EST, LEVL III, 20-29 MIN: ICD-10-PCS | Mod: 25,S$GLB,, | Performed by: PHYSICIAN ASSISTANT

## 2022-10-03 PROCEDURE — 99213 OFFICE O/P EST LOW 20 MIN: CPT | Mod: 25,S$GLB,, | Performed by: PHYSICIAN ASSISTANT

## 2022-10-03 PROCEDURE — 20610 DRAIN/INJ JOINT/BURSA W/O US: CPT | Mod: LT,S$GLB,, | Performed by: PHYSICIAN ASSISTANT

## 2022-10-03 PROCEDURE — 20610 LARGE JOINT ASPIRATION/INJECTION: L SUBACROMIAL BURSA: ICD-10-PCS | Mod: LT,S$GLB,, | Performed by: PHYSICIAN ASSISTANT

## 2022-10-03 RX ORDER — TRIAMCINOLONE ACETONIDE 40 MG/ML
40 INJECTION, SUSPENSION INTRA-ARTICULAR; INTRAMUSCULAR
Status: SHIPPED | OUTPATIENT
Start: 2022-10-03

## 2022-10-03 RX ORDER — KETOCONAZOLE 20 MG/ML
SHAMPOO, SUSPENSION TOPICAL
COMMUNITY
Start: 2022-07-28 | End: 2023-03-03

## 2022-10-03 RX ADMIN — TRIAMCINOLONE ACETONIDE 40 MG: 40 INJECTION, SUSPENSION INTRA-ARTICULAR; INTRAMUSCULAR at 12:10

## 2022-10-03 NOTE — PROGRESS NOTES
[facility]  [address]  [place] [address]  Phone: [phone]   [phone]    Patient's PCP: Ariella Cid MD  Referring Provider: No ref. provider found    Subjective:      Chief Complaint:   Chief Complaint   Patient presents with    Left Shoulder - Pain     History of left shoulder RCR on 2016, c/o limited ROM , Limited pain       Past Medical History:   Diagnosis Date    AR (allergic rhinitis)     Bigeminy     palpitations - controlled with verapamil    KATELYNN (obstructive sleep apnea)     PPD positive, treated     Skin tag     Right upper eyelid       Past Surgical History:   Procedure Laterality Date    APPENDECTOMY  1998    Dr. Pacheco    ARTHROSCOPY OF KNEE Left 01/2020    [facility]. [facility] Med[facility]    CLOSED REDUCTION AND PERCUTANEOUS PINNING (CRPP) OF WRIST Right 9/14/2020    Procedure: CLOSED REDUCTION, WRIST, WITH PERCUTANEOUS PINNING;  Surgeon: Malvin Hernández Jr., MD;  Location: [facility] OR;  Service: Orthopedics;  Laterality: Right;    COLONOSCOPY N/A 9/2/2022    Procedure: COLONOSCOPY  with banding possible;  Surgeon: Fred Solis MD;  Location: [facility] ENDO;  Service: Endoscopy;  Laterality: N/A;    FINGER TENDON REPAIR Right     Dr. Marcus, [facility] Rupture    REPAIR OF TORSION OF TESTICLE Left     Dr. Pickens, childhood    ROTATOR CUFF REPAIR Right 04/2018    Yakov Osorio / SAD with distal clavicle resection without RTC repair    ROTATOR CUFF REPAIR Left 07/2016    Yakov Osorio / RTC repair with SAD and distal clavicle resection    SEPTORHINOPLASTY  2006    Dr. Cunningham, [facility]    SEPTORHINOPLASTY  2003    Efrain Morales /  with Cautery assisted palate stiffening    ULNAR NERVE TRANSPOSITION Right 1998    Dr. Nunn, [facility]       Current Outpatient Medications   Medication Sig    ketoconazole (NIZORAL) 2 % shampoo Apply topically twice a week.    pantoprazole (PROTONIX) 40 MG tablet Take 1 tablet (40 mg total) by mouth once daily.     verapamiL (VERELAN) 120 MG C24P Take 1 capsule (120 mg total) by mouth once daily.     No current facility-administered medications for this visit.       Review of patient's allergies indicates:  No Known Allergies    Family History   Problem Relation Age of Onset    Diabetes Mother     COPD Father     Cancer Maternal Grandmother         breast    Macular degeneration Maternal Grandmother     Amblyopia Neg Hx     Blindness Neg Hx     Cataracts Neg Hx     Glaucoma Neg Hx     Hypertension Neg Hx     Retinal detachment Neg Hx     Strabismus Neg Hx     Stroke Neg Hx     Thyroid disease Neg Hx        Social History     Socioeconomic History    Marital status:      Spouse name: Lauryn    Number of children: 3    Years of education: 6    Highest education level: Master's degree (e.g., MA, MS, Rommel, MEd, MSW, MARIZA)   Occupational History    Occupation: Physician Assistant     Employer: [employer]     Comment: [employer]    Occupation: LTC [employer]     Employer: [employer]     Comment: [employer]   Tobacco Use    Smoking status: Never    Smokeless tobacco: Never   Substance and Sexual Activity    Alcohol use: Yes     Comment: Occasional; 1-2 drinks per month    Drug use: No    Sexual activity: Yes     Partners: Female     Birth control/protection: Surgical     Social Determinants of Health     Financial Resource Strain: Low Risk     Difficulty of Paying Living Expenses: Not very hard   Food Insecurity: No Food Insecurity    Worried About Running Out of Food in the Last Year: Never true    Ran Out of Food in the Last Year: Never true   Transportation Needs: No Transportation Needs    Lack of Transportation (Medical): No    Lack of Transportation (Non-Medical): No   Physical Activity: Sufficiently Active    Days of Exercise per Week: 5 days    Minutes of Exercise per Session: 60 min   Stress: No Stress Concern Present    Feeling of Stress : Only a little   Social Connections:  Unknown    Frequency of Communication with Friends and Family: Three times a week    Frequency of Social Gatherings with Friends and Family: Twice a week    Active Member of Clubs or Organizations: Yes    Attends Club or Organization Meetings: 1 to 4 times per year    Marital Status:    Housing Stability: Low Risk     Unable to Pay for Housing in the Last Year: No    Number of Places Lived in the Last Year: 1    Unstable Housing in the Last Year: No       History of present illness:  Patient presents to the clinic today with chief complaint of left shoulder pain that has been intermittently bothering him for the past several months.  He does not recollect any recent injury or trauma to the shoulder.  He is left hand dominant.  He does have a history of a left shoulder arthroscopy with subacromial decompression, distal clavicle resection, and rotator cuff tendon repair with Dr. Hernan Pineda at [facility] approximately 6 years ago.  He has generally done very well until recently.  He has been taken various over-the-counter NSAIDs (Aleve, Motrin, Advil) without much relief of his symptoms.    Review of Systems:    Constitutional: Negative for chills, fever and weight loss.   HENT: Negative for congestion.    Eyes: Negative for discharge and redness.   Respiratory: Negative for cough and shortness of breath.    Cardiovascular: Negative for chest pain.   Gastrointestinal: Negative for nausea and vomiting.   Musculoskeletal: See HPI.   Skin: Negative for rash.   Neurological: Negative for headaches.   Endo/Heme/Allergies: Does not bruise/bleed easily.   Psychiatric/Behavioral: The patient is not nervous/anxious.    All other systems reviewed and are negative.       Objective:      Physical Examination:    Vital Signs:  There were no vitals filed for this visit.    Body mass index is 28.1 kg/m².    This a well-developed, well nourished patient in no acute distress.  They are alert and oriented and cooperative to  examination.     Left shoulder exam:  Skin to his left shoulder is clean dry and intact.  There is no erythema or ecchymosis.  There are healed arthroscopic portals consistent with his previous surgery.  There are no signs or symptoms of infection.  Patient is neurovascularly intact throughout the left upper extremity.  He has full active range of motion with forward flexion, external rotation, internal rotation, and abduction.  His left rotator cuff tendon is grossly intact to resisted muscle testing but is somewhat weak and painful for him.  He does have a positive Neer impingement sign as well as a positive Lo test.  He has a negative Spurling's.  He is not particularly tender over his left acromioclavicular joint and he has a negative crossover test.  His speed's test is equivocal.    Pertinent New Results:        XRAY Report / Interpretation:   Two views were taken of his left shoulder today:  AP and Y-view.  They reveal no acute fractures or dislocations.  Patient does have a wide open left acromioclavicular joint.  He has a type 1 acromion.      Assessment:       1. Nontraumatic tear of left rotator cuff, unspecified tear extent      Plan:     Nontraumatic tear of left rotator cuff, unspecified tear extent  -     X-Ray Shoulder 2 or More Views Left  -     MRI Shoulder Without Contrast Left; Future; Expected date: 10/03/2022      Follow up for MRI Resutls.    Patient may have a recurrent left rotator cuff tendon tear.  He is painful and weak on that side.  He does have a prior surgical history on the shoulder.  I did inject his left shoulder subacromial space today via a posterior lateral approach with 40 mg of Kenalog and lidocaine.  I would like to proceed with an MRI of his left shoulder to evaluate the soft tissue structures and assess the integrity of his previous rotator cuff tendon repair.  Will have follow-up with that data and make further orthopedic recommendations from there.  He can continue  with his over-the-counter NSAID of choice until that time.        SERGO Chahal, PA-C    This note was created using Smart [employer] voice recognition software that occasionally misinterprets words or phrases.

## 2022-10-03 NOTE — PROCEDURES
Large Joint Aspiration/Injection: L subacromial bursa    Date/Time: 10/3/2022 12:45 PM  Performed by: Dez Cohen PA-C  Authorized by: Dez Cohen PA-C     Consent Done?:  Yes (Verbal)  Indications:  Pain  Site marked: the procedure site was marked    Timeout: prior to procedure the correct patient, procedure, and site was verified    Prep: patient was prepped and draped in usual sterile fashion      Local anesthesia used?: Yes    Local anesthetic:  Lidocaine 1% without epinephrine  Ultrasonic Guidance for needle placement?: No    Location:  Shoulder  Site:  L subacromial bursa  Medications:  40 mg triamcinolone acetonide 40 mg/mL  Patient tolerance:  Patient tolerated the procedure well with no immediate complications

## 2022-10-07 ENCOUNTER — OFFICE VISIT (OUTPATIENT)
Dept: ORTHOPEDICS | Facility: CLINIC | Age: 47
End: 2022-10-07
Payer: OTHER GOVERNMENT

## 2022-10-07 ENCOUNTER — PATIENT MESSAGE (OUTPATIENT)
Dept: FAMILY MEDICINE | Facility: CLINIC | Age: 47
End: 2022-10-07
Payer: OTHER GOVERNMENT

## 2022-10-07 VITALS
SYSTOLIC BLOOD PRESSURE: 132 MMHG | HEIGHT: 73 IN | WEIGHT: 213 LBS | DIASTOLIC BLOOD PRESSURE: 76 MMHG | BODY MASS INDEX: 28.23 KG/M2

## 2022-10-07 DIAGNOSIS — M17.11 PRIMARY OSTEOARTHRITIS OF RIGHT KNEE: ICD-10-CM

## 2022-10-07 DIAGNOSIS — S73.192A TEAR OF LEFT ACETABULAR LABRUM, INITIAL ENCOUNTER: Primary | ICD-10-CM

## 2022-10-07 DIAGNOSIS — M17.12 PRIMARY OSTEOARTHRITIS OF LEFT KNEE: ICD-10-CM

## 2022-10-07 DIAGNOSIS — Z98.890 HISTORY OF ARTHROSCOPY OF LEFT KNEE: ICD-10-CM

## 2022-10-07 DIAGNOSIS — M51.36 DDD (DEGENERATIVE DISC DISEASE), LUMBAR: ICD-10-CM

## 2022-10-07 PROCEDURE — 20610 LARGE JOINT ASPIRATION/INJECTION: R KNEE: ICD-10-PCS | Mod: 50,S$GLB,, | Performed by: PHYSICIAN ASSISTANT

## 2022-10-07 PROCEDURE — 20610 DRAIN/INJ JOINT/BURSA W/O US: CPT | Mod: 50,S$GLB,, | Performed by: PHYSICIAN ASSISTANT

## 2022-10-07 PROCEDURE — 99213 PR OFFICE/OUTPT VISIT, EST, LEVL III, 20-29 MIN: ICD-10-PCS | Mod: 25,S$GLB,, | Performed by: PHYSICIAN ASSISTANT

## 2022-10-07 PROCEDURE — 99213 OFFICE O/P EST LOW 20 MIN: CPT | Mod: 25,S$GLB,, | Performed by: PHYSICIAN ASSISTANT

## 2022-10-07 RX ORDER — TRIAMCINOLONE ACETONIDE 40 MG/ML
40 INJECTION, SUSPENSION INTRA-ARTICULAR; INTRAMUSCULAR
Status: DISCONTINUED | OUTPATIENT
Start: 2022-10-07 | End: 2022-10-07 | Stop reason: HOSPADM

## 2022-10-07 RX ADMIN — TRIAMCINOLONE ACETONIDE 40 MG: 40 INJECTION, SUSPENSION INTRA-ARTICULAR; INTRAMUSCULAR at 11:10

## 2022-10-07 NOTE — PROCEDURES
Large Joint Aspiration/Injection: R knee    Date/Time: 10/7/2022 11:30 AM  Performed by: Dez Cohen PA-C  Authorized by: Dez Cohen PA-C     Consent Done?:  Yes (Verbal)  Indications:  Pain  Site marked: the procedure site was marked    Timeout: prior to procedure the correct patient, procedure, and site was verified    Prep: patient was prepped and draped in usual sterile fashion      Local anesthesia used?: Yes    Local anesthetic:  Lidocaine 1% without epinephrine    Details:  Needle Size:  25 G  Ultrasonic Guidance for needle placement?: No    Location:  Knee  Site:  R knee  Medications:  40 mg triamcinolone acetonide 40 mg/mL  Patient tolerance:  Patient tolerated the procedure well with no immediate complications  Large Joint Aspiration/Injection: L knee    Date/Time: 10/7/2022 11:30 AM  Performed by: Dez Cohen PA-C  Authorized by: Dez Cohen PA-C     Consent Done?:  Yes (Verbal)  Indications:  Pain  Site marked: the procedure site was marked    Timeout: prior to procedure the correct patient, procedure, and site was verified    Prep: patient was prepped and draped in usual sterile fashion      Local anesthesia used?: Yes    Local anesthetic:  Lidocaine 1% without epinephrine    Details:  Needle Size:  25 G  Ultrasonic Guidance for needle placement?: No    Location:  Knee  Site:  L knee  Medications:  40 mg triamcinolone acetonide 40 mg/mL  Patient tolerance:  Patient tolerated the procedure well with no immediate complications

## 2022-10-07 NOTE — PROGRESS NOTES
[facility]  [address]  [place] [address]  Phone: [phone]   [phone]    Patient's PCP: Ariella Cid MD  Referring Provider: No ref. provider found    Subjective:      Chief Complaint:   Chief Complaint   Patient presents with    Right Knee - Pain     Patient is here for his synvisc one injections in both knees.    Left Knee - Pain    Left Hip - Pain     Patient is having left hip pain, this pain started 09/15/22 when he jumped out of a airplane, no groin pain.       Past Medical History:   Diagnosis Date    AR (allergic rhinitis)     Bigeminy     palpitations - controlled with verapamil    KATELYNN (obstructive sleep apnea)     PPD positive, treated     Skin tag     Right upper eyelid       Past Surgical History:   Procedure Laterality Date    APPENDECTOMY  1998    Dr. Pacheco    ARTHROSCOPY OF KNEE Left 01/2020    Dr. Diaz[facility] Med[facility]    CLOSED REDUCTION AND PERCUTANEOUS PINNING (CRPP) OF WRIST Right 9/14/2020    Procedure: CLOSED REDUCTION, WRIST, WITH PERCUTANEOUS PINNING;  Surgeon: Malvin Hernández Jr., MD;  Location: [facility] OR;  Service: Orthopedics;  Laterality: Right;    COLONOSCOPY N/A 9/2/2022    Procedure: COLONOSCOPY  with banding possible;  Surgeon: Fred Solis MD;  Location: [facility];  Service: Endoscopy;  Laterality: N/A;    FINGER TENDON REPAIR Right     Dr. Marcus, [facility] Rupture    REPAIR OF TORSION OF TESTICLE Left     Dr. Pickens, childhood    ROTATOR CUFF REPAIR Right 04/2018    Yakov Osorio / SAD with distal clavicle resection without RTC repair    ROTATOR CUFF REPAIR Left 07/2016    Yakov Osorio / RTC repair with SAD and distal clavicle resection    SEPTORHINOPLASTY  2006    Dr. Cunningham, [facility]    SEPTORHINOPLASTY  2003    Dr. Snow, [facility] /  with Cautery assisted palate stiffening    ULNAR NERVE TRANSPOSITION Right 1998    Dr. Nunn, [facility]       Current Outpatient Medications   Medication Sig    ketoconazole  (NIZORAL) 2 % shampoo Apply topically twice a week.    pantoprazole (PROTONIX) 40 MG tablet Take 1 tablet (40 mg total) by mouth once daily.    verapamiL (VERELAN) 120 MG C24P Take 1 capsule (120 mg total) by mouth once daily.     No current facility-administered medications for this visit.     Facility-Administered Medications Ordered in Other Visits   Medication    triamcinolone acetonide injection 40 mg       Review of patient's allergies indicates:  No Known Allergies    Family History   Problem Relation Age of Onset    Diabetes Mother     COPD Father     Cancer Maternal Grandmother         breast    Macular degeneration Maternal Grandmother     Amblyopia Neg Hx     Blindness Neg Hx     Cataracts Neg Hx     Glaucoma Neg Hx     Hypertension Neg Hx     Retinal detachment Neg Hx     Strabismus Neg Hx     Stroke Neg Hx     Thyroid disease Neg Hx        Social History     Socioeconomic History    Marital status:      Spouse name: Lauryn    Number of children: 3    Years of education: 6    Highest education level: Master's degree (e.g., MA, MS, Rommel, MEd, MSW, MARIZA)   Occupational History    Occupation: Physician Assistant     Employer: [employer]     Comment: [employer]    Occupation: LTC [employer]     Employer: [employer]     Comment: [employer]   Tobacco Use    Smoking status: Never    Smokeless tobacco: Never   Substance and Sexual Activity    Alcohol use: Yes     Comment: Occasional; 1-2 drinks per month    Drug use: No    Sexual activity: Yes     Partners: Female     Birth control/protection: Surgical     Social Determinants of Health     Financial Resource Strain: Low Risk     Difficulty of Paying Living Expenses: Not very hard   Food Insecurity: No Food Insecurity    Worried About Running Out of Food in the Last Year: Never true    Ran Out of Food in the Last Year: Never true   Transportation Needs: No Transportation Needs    Lack of Transportation (Medical): No     Lack of Transportation (Non-Medical): No   Physical Activity: Sufficiently Active    Days of Exercise per Week: 5 days    Minutes of Exercise per Session: 60 min   Stress: No Stress Concern Present    Feeling of Stress : Only a little   Social Connections: Unknown    Frequency of Communication with Friends and Family: Three times a week    Frequency of Social Gatherings with Friends and Family: Twice a week    Active Member of Clubs or Organizations: Yes    Attends Club or Organization Meetings: 1 to 4 times per year    Marital Status:    Housing Stability: Low Risk     Unable to Pay for Housing in the Last Year: No    Number of Places Lived in the Last Year: 1    Unstable Housing in the Last Year: No       History of present illness:  Hany comes in today for follow-up for his bilateral knees and his left hip.  We have treated his bilateral knees on multiple occasions in the past with viscosupplementation and corticosteroid injections.  His most recent viscosupplementation injections were just over 5 months ago.  He is interested in repeating these near his 6 month jaylene as is beginning to develop symptoms about the knees again.  As far as his left hip is concerned, he does have symptoms of groin pain and worsening of symptoms with prolonged sitting.  He feels as if the pain is exacerbated with external rotation of the hip.  He denies any new injury or trauma.  He has had symptoms like this before.    Review of Systems:    Constitutional: Negative for chills, fever and weight loss.   HENT: Negative for congestion.    Eyes: Negative for discharge and redness.   Respiratory: Negative for cough and shortness of breath.    Cardiovascular: Negative for chest pain.   Gastrointestinal: Negative for nausea and vomiting.   Musculoskeletal: See HPI.   Skin: Negative for rash.   Neurological: Negative for headaches.   Endo/Heme/Allergies: Does not bruise/bleed easily.   Psychiatric/Behavioral: The patient is not  nervous/anxious.    All other systems reviewed and are negative.       Objective:      Physical Examination:    Vital Signs:    Vitals:    10/07/22 1058   BP: 132/76       Body mass index is 28.1 kg/m².    This a well-developed, well nourished patient in no acute distress.  They are alert and oriented and cooperative to examination.     Bilateral knee exam:  His bilateral knee exam is similar to where he was on his last visit approximately 6 months ago.  Skin to his bilateral knees is clean dry and intact.  There is no erythema or ecchymosis.  There are no signs or symptoms of infection.  Patient is neurovascularly intact throughout bilateral lower extremities.  Bilateral calves are soft and nontender.  Bilateral knee range of motion is approximately 0-130 degrees.  Both knees are stable to varus and valgus stresses while held in extension.  Patient is diffusely tender along the medial aspect of his bilateral knees.  He does have crepitus with range of motioning of his bilateral knees.  He is able weight bear as tolerated on his bilateral lower extremities with a mildly antalgic gait.  He has a negative straight leg raise bilaterally.     Left hip exam:  Skin to his left hip is clean dry and intact.  There is no erythema or ecchymosis.  There are no signs or symptoms of infection.  Patient is neurovascularly intact throughout the left lower extremity.  Left calf is soft and nontender.  He has well-preserved range of motion of his left knee with flexion/extension.  He does have full range of motion left hip with internal and external rotation however it is painful for him, more so internally.  He is nontender to palpation over his left greater trochanter.  He has a negative straight leg raise on the left.  He is able weight bear as tolerated on his bilateral lower extremities but has a mildly antalgic gait.    Pertinent New Results:        XRAY Report / Interpretation:   Three views were taken of his bilateral knees  today:  AP, lateral, and sunrise views.  They reveal no acute fractures or dislocations.  Patient has mild-moderate osteoarthritis predominantly in the medial compartment of his bilateral knees.    Two views were taken of his left hip today:  AP and lateral view.  They reveal no acute fractures or dislocations.  Visualized soft tissues are unremarkable.    Two views were taken of his lumbar spine today:  AP and lateral views.  They reveal no acute fractures or dislocations.  Patient does have multilevel degenerative disc changes throughout his lumbar spine worse at the L2-3 level.      Assessment:       1. Tear of left acetabular labrum, initial encounter    2. Primary osteoarthritis of left knee    3. Primary osteoarthritis of right knee    4. History of arthroscopy of left knee    5. DDD (degenerative disc disease), lumbar      Plan:     Tear of left acetabular labrum, initial encounter  -     X-Ray Lumbar Spine Ap And Lateral  -     X-Ray Hip 1 View Left (with Pelvis when performed)  -     MRI Hip Without Contrast Left; Future; Expected date: 10/07/2022    Primary osteoarthritis of left knee  -     X-Ray Knee 3 View Bilateral  -     Large Joint Aspiration/Injection: L knee  -     Prior authorization Order    Primary osteoarthritis of right knee  -     X-Ray Knee 3 View Bilateral  -     Large Joint Aspiration/Injection: R knee  -     Prior authorization Order    History of arthroscopy of left knee  -     Large Joint Aspiration/Injection: L knee    DDD (degenerative disc disease), lumbar      No follow-ups on file.    I injected the patient's bilateral knees today via an anterior lateral approach with 40 mg of Kenalog and lidocaine respectively.  He tolerated this well.  We will proceed with viscosupplementation authorization to perform in approximately 2 weeks in his bilateral knees.  For his left hip, he continues to have signs and symptoms of a labral tear with internal/external rotation of his hip.  We will  proceed with an MRI of his left hip to evaluate for any internal derangement/pathology.  We will review his left hip MRI when he comes in for his bilateral knee viscosupplementation injections.        SERGO Chahal, PA-C    This note was created using [employer] voice recognition software that occasionally misinterprets words or phrases.

## 2022-10-21 ENCOUNTER — HOSPITAL ENCOUNTER (OUTPATIENT)
Dept: RADIOLOGY | Facility: HOSPITAL | Age: 47
Discharge: HOME | End: 2022-10-21
Attending: PHYSICIAN ASSISTANT
Payer: OTHER GOVERNMENT

## 2022-10-21 ENCOUNTER — OFFICE VISIT (OUTPATIENT)
Dept: ORTHOPEDICS | Facility: CLINIC | Age: 47
End: 2022-10-21
Payer: OTHER GOVERNMENT

## 2022-10-21 VITALS — HEIGHT: 73 IN | WEIGHT: 213 LBS | BODY MASS INDEX: 28.23 KG/M2

## 2022-10-21 DIAGNOSIS — M75.102 NONTRAUMATIC TEAR OF LEFT ROTATOR CUFF, UNSPECIFIED TEAR EXTENT: ICD-10-CM

## 2022-10-21 DIAGNOSIS — S73.192A TEAR OF LEFT ACETABULAR LABRUM, INITIAL ENCOUNTER: ICD-10-CM

## 2022-10-21 DIAGNOSIS — M17.12 PRIMARY OSTEOARTHRITIS OF LEFT KNEE: Primary | ICD-10-CM

## 2022-10-21 DIAGNOSIS — M17.11 PRIMARY OSTEOARTHRITIS OF RIGHT KNEE: ICD-10-CM

## 2022-10-21 PROCEDURE — 99499 NO LOS: ICD-10-PCS | Mod: S$GLB,,, | Performed by: PHYSICIAN ASSISTANT

## 2022-10-21 PROCEDURE — 73221 MRI JOINT UPR EXTREM W/O DYE: CPT | Mod: TC,PO,LT

## 2022-10-21 PROCEDURE — 99499 UNLISTED E&M SERVICE: CPT | Mod: S$GLB,,, | Performed by: PHYSICIAN ASSISTANT

## 2022-10-21 PROCEDURE — 20610 DRAIN/INJ JOINT/BURSA W/O US: CPT | Mod: 50,S$GLB,, | Performed by: PHYSICIAN ASSISTANT

## 2022-10-21 PROCEDURE — 20610 LARGE JOINT ASPIRATION/INJECTION: R KNEE: ICD-10-PCS | Mod: 50,S$GLB,, | Performed by: PHYSICIAN ASSISTANT

## 2022-10-21 PROCEDURE — 73721 MRI JNT OF LWR EXTRE W/O DYE: CPT | Mod: TC,LT

## 2022-10-21 NOTE — PROCEDURES
Large Joint Aspiration/Injection: R knee    Date/Time: 10/21/2022 9:00 AM  Performed by: Dez Cohen PA-C  Authorized by: Dez Cohen PA-C     Consent Done?:  Yes (Verbal)  Indications:  Pain  Site marked: the procedure site was marked    Timeout: prior to procedure the correct patient, procedure, and site was verified    Prep: patient was prepped and draped in usual sterile fashion      Local anesthesia used?: Yes    Local anesthetic:  Lidocaine 1% without epinephrine    Details:  Needle Size:  20 G  Ultrasonic Guidance for needle placement?: No    Location:  Knee  Site:  R knee  Medications:  48 mg hylan g-f 20 48 mg/6 mL  Patient tolerance:  Patient tolerated the procedure well with no immediate complications  Large Joint Aspiration/Injection: L knee    Date/Time: 10/21/2022 9:00 AM  Performed by: Dez Cohen PA-C  Authorized by: Dez Cohen PA-C     Consent Done?:  Yes (Verbal)  Indications:  Pain  Site marked: the procedure site was marked    Timeout: prior to procedure the correct patient, procedure, and site was verified    Prep: patient was prepped and draped in usual sterile fashion      Local anesthesia used?: Yes    Local anesthetic:  Lidocaine 1% without epinephrine    Details:  Needle Size:  20 G  Ultrasonic Guidance for needle placement?: No    Location:  Knee  Site:  L knee  Medications:  48 mg hylan g-f 20 48 mg/6 mL  Patient tolerance:  Patient tolerated the procedure well with no immediate complications

## 2022-10-21 NOTE — PROGRESS NOTES
[facility]  [address]  [place] [address]  Phone: [phone]   [phone]    Patient's PCP: Ariella Cid MD  Referring Provider: No ref. provider found    Subjective:      Chief Complaint:   Chief Complaint   Patient presents with    Left Knee - Pain     Synvisc One injections today    Right Knee - Pain       Past Medical History:   Diagnosis Date    AR (allergic rhinitis)     Bigeminy     palpitations - controlled with verapamil    KATELYNN (obstructive sleep apnea)     PPD positive, treated     Skin tag     Right upper eyelid       Past Surgical History:   Procedure Laterality Date    APPENDECTOMY  1998    Dr. Pacheco    ARTHROSCOPY OF KNEE Left 01/2020    Dr. Diaz[facility] Med[facility]    CLOSED REDUCTION AND PERCUTANEOUS PINNING (CRPP) OF WRIST Right 9/14/2020    Procedure: CLOSED REDUCTION, WRIST, WITH PERCUTANEOUS PINNING;  Surgeon: Malvin Hernández Jr., MD;  Location: [facility];  Service: Orthopedics;  Laterality: Right;    COLONOSCOPY N/A 9/2/2022    Procedure: COLONOSCOPY  with banding possible;  Surgeon: Fred Solis MD;  Location: [facility];  Service: Endoscopy;  Laterality: N/A;    FINGER TENDON REPAIR Right     Dr. Marcus, [facility] Rupture    REPAIR OF TORSION OF TESTICLE Left     Dr. Pickens, [facility]    ROTATOR CUFF REPAIR Right 04/2018    Yakov Osorio / SAD with distal clavicle resection without RTC repair    ROTATOR CUFF REPAIR Left 07/2016    Yakov Osorio / RTC repair with SAD and distal clavicle resection    SEPTORHINOPLASTY  2006    Dr. Cunningham, [facility]    SEPTORHINOPLASTY  2003    Efrain Morales /  with Cautery assisted palate stiffening    ULNAR NERVE TRANSPOSITION Right 1998    Dr. Nunn, [facility]       Current Outpatient Medications   Medication Sig    ketoconazole (NIZORAL) 2 % shampoo Apply topically twice a week.    pantoprazole (PROTONIX) 40 MG tablet Take 1 tablet (40 mg total) by mouth once daily.    verapamiL (VERELAN)  .1. What PRN did patient receive? Hydroxyzine/Melatonin    2. What was the patient doing that led to the PRN medication? Anxiety, sleep aid    3. Did they require R/S? NO    4. Side effects to PRN medication? None    5. After 1 Hour, patient appeared: Sleeping       120 MG C24P Take 1 capsule (120 mg total) by mouth once daily.     No current facility-administered medications for this visit.     Facility-Administered Medications Ordered in Other Visits   Medication    triamcinolone acetonide injection 40 mg       Review of patient's allergies indicates:  No Known Allergies    Family History   Problem Relation Age of Onset    Diabetes Mother     COPD Father     Cancer Maternal Grandmother         breast    Macular degeneration Maternal Grandmother     Amblyopia Neg Hx     Blindness Neg Hx     Cataracts Neg Hx     Glaucoma Neg Hx     Hypertension Neg Hx     Retinal detachment Neg Hx     Strabismus Neg Hx     Stroke Neg Hx     Thyroid disease Neg Hx        Social History     Socioeconomic History    Marital status:      Spouse name: Lauryn    Number of children: 3    Years of education: 6    Highest education level: Master's degree (e.g., MA, MS, Rommel, MEd, MSW, MARIZA)   Occupational History    Occupation: Physician Assistant     Employer: [employer]     Comment: [employer]    Occupation: LTC [employer]     Employer: [employer]     Comment: [employer]   Tobacco Use    Smoking status: Never    Smokeless tobacco: Never   Substance and Sexual Activity    Alcohol use: Yes     Comment: Occasional; 1-2 drinks per month    Drug use: No    Sexual activity: Yes     Partners: Female     Birth control/protection: Surgical     Social Determinants of Health     Financial Resource Strain: Low Risk     Difficulty of Paying Living Expenses: Not very hard   Food Insecurity: No Food Insecurity    Worried About Running Out of Food in the Last Year: Never true    Ran Out of Food in the Last Year: Never true   Transportation Needs: No Transportation Needs    Lack of Transportation (Medical): No    Lack of Transportation (Non-Medical): No   Physical Activity: Sufficiently Active    Days of Exercise per Week: 5 days    Minutes of Exercise per Session: 60 min    Stress: No Stress Concern Present    Feeling of Stress : Only a little   Social Connections: Unknown    Frequency of Communication with Friends and Family: Three times a week    Frequency of Social Gatherings with Friends and Family: Twice a week    Active Member of Clubs or Organizations: Yes    Attends Club or Organization Meetings: 1 to 4 times per year    Marital Status:    Housing Stability: Low Risk     Unable to Pay for Housing in the Last Year: No    Number of Places Lived in the Last Year: 1    Unstable Housing in the Last Year: No       History of present illness:  Hany comes in today for follow-up for his bilateral knees.  He is here today for his bilateral knee Synvisc-One injections.    Review of Systems:    Constitutional: Negative for chills, fever and weight loss.   HENT: Negative for congestion.    Eyes: Negative for discharge and redness.   Respiratory: Negative for cough and shortness of breath.    Cardiovascular: Negative for chest pain.   Gastrointestinal: Negative for nausea and vomiting.   Musculoskeletal: See HPI.   Skin: Negative for rash.   Neurological: Negative for headaches.   Endo/Heme/Allergies: Does not bruise/bleed easily.   Psychiatric/Behavioral: The patient is not nervous/anxious.    All other systems reviewed and are negative.       Objective:      Physical Examination:    Vital Signs:  There were no vitals filed for this visit.    Body mass index is 28.1 kg/m².    This a well-developed, well nourished patient in no acute distress.  They are alert and oriented and cooperative to examination.     Bilateral knee exam:  Bilateral knee exam is similar where he was on his last visit earlier this month. Skin to his bilateral knees is clean dry and intact.  There is no erythema or ecchymosis.  There are no signs or symptoms of infection.  Patient is neurovascularly intact throughout bilateral lower extremities.  Bilateral calves are soft and nontender.  Bilateral knee range of  motion is approximately 0-130 degrees.  Both knees are stable to varus and valgus stresses while held in extension.  Patient is diffusely tender along the medial aspect of his bilateral knees.  He does have crepitus with range of motioning of his bilateral knees.  He is able weight bear as tolerated on his bilateral lower extremities with a mildly antalgic gait.  He has a negative straight leg raise bilaterally.     Pertinent New Results:        XRAY Report / Interpretation:   No new radiographs were taken on today's clinic visit.      Assessment:       1. Primary osteoarthritis of left knee    2. Primary osteoarthritis of right knee      Plan:     Primary osteoarthritis of left knee  -     Large Joint Aspiration/Injection: L knee    Primary osteoarthritis of right knee  -     Large Joint Aspiration/Injection: R knee        Follow up if symptoms worsen or fail to improve.    I anesthetized his bilateral knees anterior lateral aspect with approximately 4 cc of 1% lidocaine plain respectively.  I then injected through the anesthetized area Synvisc-One viscosupplementation injection bilaterally.  He tolerated this well.  He will follow up with us on an as-needed basis if any issues or concerns arise.        Dez Cohen, EVANS, PA-C    This note was created using [employer] voice recognition software that occasionally misinterprets words or phrases.

## 2022-11-02 ENCOUNTER — PATIENT MESSAGE (OUTPATIENT)
Dept: FAMILY MEDICINE | Facility: CLINIC | Age: 47
End: 2022-11-02
Payer: OTHER GOVERNMENT

## 2023-02-08 ENCOUNTER — TELEPHONE (OUTPATIENT)
Dept: ORTHOPEDICS | Facility: CLINIC | Age: 48
End: 2023-02-08

## 2023-02-08 DIAGNOSIS — E88.810 METABOLIC SYNDROME: Primary | ICD-10-CM

## 2023-02-08 RX ORDER — SEMAGLUTIDE 1.34 MG/ML
0.5 INJECTION, SOLUTION SUBCUTANEOUS
Qty: 3 PEN | Refills: 3 | Status: SHIPPED | OUTPATIENT
Start: 2023-02-08 | End: 2023-04-14

## 2023-02-08 NOTE — TELEPHONE ENCOUNTER
47-year-old male well known to me.  He has a history of elevated triglycerides.  He is a family history of diabetes mellitus and cardiac disease.  Has signs and symptoms consistent with metabolic syndrome.  He has an elevated BMI of 27.  We will initiate treatment with Ozempic 0.5 mg subcutaneous weekly.  Prescription sent to his local pharmacy today.

## 2023-03-03 ENCOUNTER — OFFICE VISIT (OUTPATIENT)
Dept: FAMILY MEDICINE | Facility: CLINIC | Age: 48
End: 2023-03-03
Payer: OTHER GOVERNMENT

## 2023-03-03 VITALS
SYSTOLIC BLOOD PRESSURE: 110 MMHG | HEART RATE: 75 BPM | TEMPERATURE: 98 F | BODY MASS INDEX: 27.99 KG/M2 | DIASTOLIC BLOOD PRESSURE: 70 MMHG | WEIGHT: 211.19 LBS | HEIGHT: 73 IN | OXYGEN SATURATION: 97 % | RESPIRATION RATE: 18 BRPM

## 2023-03-03 DIAGNOSIS — Z00.00 PREVENTATIVE HEALTH CARE: Primary | ICD-10-CM

## 2023-03-03 DIAGNOSIS — K21.9 GASTROESOPHAGEAL REFLUX DISEASE, UNSPECIFIED WHETHER ESOPHAGITIS PRESENT: ICD-10-CM

## 2023-03-03 PROCEDURE — 99213 OFFICE O/P EST LOW 20 MIN: CPT | Mod: PBBFAC,PO | Performed by: FAMILY MEDICINE

## 2023-03-03 PROCEDURE — 99396 PR PREVENTIVE VISIT,EST,40-64: ICD-10-PCS | Mod: S$PBB,,, | Performed by: FAMILY MEDICINE

## 2023-03-03 PROCEDURE — 99396 PREV VISIT EST AGE 40-64: CPT | Mod: S$PBB,,, | Performed by: FAMILY MEDICINE

## 2023-03-03 PROCEDURE — 99999 PR PBB SHADOW E&M-EST. PATIENT-LVL III: ICD-10-PCS | Mod: PBBFAC,,, | Performed by: FAMILY MEDICINE

## 2023-03-03 PROCEDURE — 99999 PR PBB SHADOW E&M-EST. PATIENT-LVL III: CPT | Mod: PBBFAC,,, | Performed by: FAMILY MEDICINE

## 2023-03-03 NOTE — PROGRESS NOTES
Subjective:       Patient ID: Hany Caba is a 47 y.o. male.    Chief Complaint: Preventative Health Care (Physical)    Here for annual exam    Bigemy - stable taking Verapamil daily  GERD - taking protonix on PRN; managing this with diet mostly, but getting reflux 50% of the time. Using antacids. He has had reflux issues for over 5 years.    Exercising most days with running.  He had tried Ozempic for weight loss, but insurance will not cover this.          Past Medical History:   Diagnosis Date    AR (allergic rhinitis)     Bigeminy     palpitations - controlled with verapamil    KATELYNN (obstructive sleep apnea)     PPD positive, treated     Skin tag     Right upper eyelid    Unspecified hemorrhoids        Past Surgical History:   Procedure Laterality Date    APPENDECTOMY  1998    Dr. Pacheco    ARTHROSCOPY OF KNEE Left 01/2020    Dr. [facility]    CLOSED REDUCTION AND PERCUTANEOUS PINNING (CRPP) OF WRIST Right 09/14/2020    Procedure: CLOSED REDUCTION, WRIST, WITH PERCUTANEOUS PINNING;  Surgeon: Malvin Hernández Jr., MD;  Location: [facility] OR;  Service: Orthopedics;  Laterality: Right;    COLONOSCOPY N/A 09/02/2022    Procedure: COLONOSCOPY  with banding possible;  Surgeon: Fred Solis MD;  Location: [facility];  Service: Endoscopy;  Laterality: N/A;    FINGER TENDON REPAIR Right     Dr. Marcus, [facility] Rupture    REPAIR OF TORSION OF TESTICLE Left     Dr. Pickens, childhood    ROTATOR CUFF REPAIR Right 04/2018    Yakov Osorio / SAD with distal clavicle resection without RTC repair    ROTATOR CUFF REPAIR Left 07/2016    Yakov Osorio / RTC repair with SAD and distal clavicle resection    SEPTORHINOPLASTY  2006    Dr. Cunningham, [facility]    SEPTORHINOPLASTY  2003    Efrain Morales /  with Cautery assisted palate stiffening    [facility]    VASECTOMY  10/2019    [facility]       Review of patient's  allergies indicates:  No Known Allergies    Social History     Socioeconomic History    Marital status:      Spouse name: Lauryn    Number of children: 3    Years of education: 6    Highest education level: Master's degree (e.g., MA, MS, Rommel, MEd, MSW, MARIZA)   Occupational History    Occupation: Ortho Physician Assistant     Employer: [employer]     Comment: [employer]    Occupation: LTC [employer]     Employer: [employer]     Comment: [employer]   Tobacco Use    Smoking status: Never    Smokeless tobacco: Never   Substance and Sexual Activity    Alcohol use: Yes     Comment: Occasional; 1-2 drinks per month    Drug use: No    Sexual activity: Yes     Partners: Female     Birth control/protection: Surgical     Social Determinants of Health     Financial Resource Strain: Medium Risk    Difficulty of Paying Living Expenses: Somewhat hard   Food Insecurity: No Food Insecurity    Worried About Running Out of Food in the Last Year: Never true    Ran Out of Food in the Last Year: Never true   Transportation Needs: No Transportation Needs    Lack of Transportation (Medical): No    Lack of Transportation (Non-Medical): No   Physical Activity: Sufficiently Active    Days of Exercise per Week: 5 days    Minutes of Exercise per Session: 60 min   Stress: No Stress Concern Present    Feeling of Stress : Only a little   Social Connections: Unknown    Frequency of Communication with Friends and Family: Once a week    Frequency of Social Gatherings with Friends and Family: Once a week    Active Member of Clubs or Organizations: Yes    Attends Club or Organization Meetings: More than 4 times per year    Marital Status:    Housing Stability: Low Risk     Unable to Pay for Housing in the Last Year: No    Number of Places Lived in the Last Year: 1    Unstable Housing in the Last Year: No       Current Outpatient Medications on File Prior to Visit   Medication  "Sig Dispense Refill    pantoprazole (PROTONIX) 40 MG tablet Take 1 tablet (40 mg total) by mouth once daily. 90 tablet 3    semaglutide (OZEMPIC) 0.25 mg or 0.5 mg(2 mg/1.5 mL) pen injector Inject 0.5 mg into the skin every 7 days. 3 pen 3    verapamiL (VERELAN) 120 MG C24P TAKE 1 CAPSULE DAILY 90 capsule 3     Current Facility-Administered Medications on File Prior to Visit   Medication Dose Route Frequency Provider Last Rate Last Admin    triamcinolone acetonide injection 40 mg  40 mg Intra-articular  Dez Cohen PA-C   40 mg at 10/03/22 1245       Family History   Problem Relation Age of Onset    Diabetes Mother     COPD Father         suden cariac event    Cancer Maternal Grandmother         breast    Macular degeneration Maternal Grandmother     Amblyopia Neg Hx     Blindness Neg Hx     Cataracts Neg Hx     Glaucoma Neg Hx     Hypertension Neg Hx     Retinal detachment Neg Hx     Strabismus Neg Hx     Stroke Neg Hx     Thyroid disease Neg Hx        Review of Systems   Constitutional:  Negative for appetite change, chills, fever and unexpected weight change.   HENT:  Negative for sore throat and trouble swallowing.    Eyes:  Negative for pain and visual disturbance.   Respiratory:  Negative for cough, chest tightness, shortness of breath and wheezing.    Cardiovascular:  Negative for chest pain, palpitations and leg swelling.   Gastrointestinal:  Negative for abdominal pain, blood in stool, constipation, diarrhea and nausea.   Genitourinary:  Negative for difficulty urinating, dysuria and hematuria.   Musculoskeletal:  Negative for arthralgias, gait problem and neck pain.   Skin:  Negative for rash and wound.   Neurological:  Negative for dizziness, weakness, light-headedness and headaches.   Hematological:  Negative for adenopathy.   Psychiatric/Behavioral:  Negative for dysphoric mood.      Objective:      /70   Pulse 75   Temp 97.9 °F (36.6 °C) (Oral)   Resp 18   Ht 6' 1" " (1.854 m)   Wt 95.8 kg (211 lb 3.2 oz)   SpO2 97%   BMI 27.86 kg/m²   Physical Exam  Constitutional:       General: He is not in acute distress.     Appearance: He is well-developed.   HENT:      Head: Normocephalic and atraumatic.      Right Ear: External ear normal.      Left Ear: External ear normal.      Mouth/Throat:      Pharynx: Uvula midline. No oropharyngeal exudate.   Eyes:      General: Lids are normal.      Conjunctiva/sclera: Conjunctivae normal.      Pupils: Pupils are equal, round, and reactive to light.   Neck:      Thyroid: No thyroid mass or thyromegaly.      Trachea: Phonation normal.   Cardiovascular:      Rate and Rhythm: Normal rate and regular rhythm.      Heart sounds: Normal heart sounds. No murmur heard.    No friction rub. No gallop.   Pulmonary:      Effort: Pulmonary effort is normal. No respiratory distress.      Breath sounds: Normal breath sounds. No wheezing or rales.   Abdominal:      General: Abdomen is flat. Bowel sounds are normal.      Palpations: Abdomen is soft.   Musculoskeletal:         General: Normal range of motion.      Cervical back: Full passive range of motion without pain, normal range of motion and neck supple.   Lymphadenopathy:      Cervical: No cervical adenopathy.   Skin:     General: Skin is warm and dry.   Neurological:      Mental Status: He is alert and oriented to person, place, and time.      Cranial Nerves: No cranial nerve deficit.      Coordination: Coordination normal.   Psychiatric:         Speech: Speech normal.         Behavior: Behavior normal.         Thought Content: Thought content normal.         Judgment: Judgment normal.       Assessment:       1. Preventative health care    2. Gastroesophageal reflux disease, unspecified whether esophagitis present        Plan:       Preventative health care  -     Comprehensive Metabolic Panel; Future; Expected date: 03/03/2023  -     CBC Auto Differential; Future; Expected date: 03/03/2023  -     Lipid  Panel; Future; Expected date: 03/03/2023  -     Hemoglobin A1C; Future; Expected date: 03/03/2023  -     TESTOSTERONE; Future; Expected date: 03/03/2023  -     Hepatitis C Antibody; Future; Expected date: 03/03/2023    Gastroesophageal reflux disease, unspecified whether esophagitis present  -     Case Request Endoscopy: ESOPHAGOGASTRODUODENOSCOPY (EGD)      Overall healthy  Labs soon  Counseled on regular exercise, maintenance of a healthy weight, balanced diet rich in fruits/vegetables and lean protein, and avoidance of unhealthy habits like smoking and excessive alcohol intake.

## 2023-03-06 ENCOUNTER — LAB VISIT (OUTPATIENT)
Dept: LAB | Facility: HOSPITAL | Age: 48
End: 2023-03-06
Attending: FAMILY MEDICINE
Payer: OTHER GOVERNMENT

## 2023-03-06 DIAGNOSIS — Z00.00 PREVENTATIVE HEALTH CARE: ICD-10-CM

## 2023-03-06 LAB
ALBUMIN SERPL BCP-MCNC: 3.9 G/DL (ref 3.5–5.2)
ALP SERPL-CCNC: 79 U/L (ref 55–135)
ALT SERPL W/O P-5'-P-CCNC: 20 U/L (ref 10–44)
ANION GAP SERPL CALC-SCNC: 9 MMOL/L (ref 8–16)
AST SERPL-CCNC: 25 U/L (ref 10–40)
BASOPHILS # BLD AUTO: 0.04 K/UL (ref 0–0.2)
BASOPHILS NFR BLD: 0.6 % (ref 0–1.9)
BILIRUB SERPL-MCNC: 0.4 MG/DL (ref 0.1–1)
BUN SERPL-MCNC: 16 MG/DL (ref 6–20)
CALCIUM SERPL-MCNC: 9.2 MG/DL (ref 8.7–10.5)
CHLORIDE SERPL-SCNC: 106 MMOL/L (ref 95–110)
CHOLEST SERPL-MCNC: 145 MG/DL (ref 120–199)
CHOLEST/HDLC SERPL: 3.6 {RATIO} (ref 2–5)
CO2 SERPL-SCNC: 25 MMOL/L (ref 23–29)
CREAT SERPL-MCNC: 1 MG/DL (ref 0.5–1.4)
DIFFERENTIAL METHOD: NORMAL
EOSINOPHIL # BLD AUTO: 0.4 K/UL (ref 0–0.5)
EOSINOPHIL NFR BLD: 5.3 % (ref 0–8)
ERYTHROCYTE [DISTWIDTH] IN BLOOD BY AUTOMATED COUNT: 12.7 % (ref 11.5–14.5)
EST. GFR  (NO RACE VARIABLE): >60 ML/MIN/1.73 M^2
ESTIMATED AVG GLUCOSE: 103 MG/DL (ref 68–131)
GLUCOSE SERPL-MCNC: 91 MG/DL (ref 70–110)
HBA1C MFR BLD: 5.2 % (ref 4–5.6)
HCT VFR BLD AUTO: 45 % (ref 40–54)
HCV AB SERPL QL IA: NORMAL
HDLC SERPL-MCNC: 40 MG/DL (ref 40–75)
HDLC SERPL: 27.6 % (ref 20–50)
HGB BLD-MCNC: 14.9 G/DL (ref 14–18)
IMM GRANULOCYTES # BLD AUTO: 0.02 K/UL (ref 0–0.04)
IMM GRANULOCYTES NFR BLD AUTO: 0.3 % (ref 0–0.5)
LDLC SERPL CALC-MCNC: 69.4 MG/DL (ref 63–159)
LYMPHOCYTES # BLD AUTO: 2 K/UL (ref 1–4.8)
LYMPHOCYTES NFR BLD: 30.2 % (ref 18–48)
MCH RBC QN AUTO: 29 PG (ref 27–31)
MCHC RBC AUTO-ENTMCNC: 33.1 G/DL (ref 32–36)
MCV RBC AUTO: 88 FL (ref 82–98)
MONOCYTES # BLD AUTO: 0.6 K/UL (ref 0.3–1)
MONOCYTES NFR BLD: 9.5 % (ref 4–15)
NEUTROPHILS # BLD AUTO: 3.6 K/UL (ref 1.8–7.7)
NEUTROPHILS NFR BLD: 54.1 % (ref 38–73)
NONHDLC SERPL-MCNC: 105 MG/DL
NRBC BLD-RTO: 0 /100 WBC
PLATELET # BLD AUTO: 229 K/UL (ref 150–450)
PMV BLD AUTO: 10 FL (ref 9.2–12.9)
POTASSIUM SERPL-SCNC: 3.9 MMOL/L (ref 3.5–5.1)
PROT SERPL-MCNC: 6.6 G/DL (ref 6–8.4)
RBC # BLD AUTO: 5.13 M/UL (ref 4.6–6.2)
SODIUM SERPL-SCNC: 140 MMOL/L (ref 136–145)
TESTOST SERPL-MCNC: 501 NG/DL (ref 304–1227)
TRIGL SERPL-MCNC: 178 MG/DL (ref 30–150)
WBC # BLD AUTO: 6.65 K/UL (ref 3.9–12.7)

## 2023-03-06 PROCEDURE — 85025 COMPLETE CBC W/AUTO DIFF WBC: CPT | Performed by: FAMILY MEDICINE

## 2023-03-06 PROCEDURE — 84403 ASSAY OF TOTAL TESTOSTERONE: CPT | Performed by: FAMILY MEDICINE

## 2023-03-06 PROCEDURE — 83036 HEMOGLOBIN GLYCOSYLATED A1C: CPT | Performed by: FAMILY MEDICINE

## 2023-03-06 PROCEDURE — 86803 HEPATITIS C AB TEST: CPT | Performed by: FAMILY MEDICINE

## 2023-03-06 PROCEDURE — 80061 LIPID PANEL: CPT | Performed by: FAMILY MEDICINE

## 2023-03-06 PROCEDURE — 80053 COMPREHEN METABOLIC PANEL: CPT | Performed by: FAMILY MEDICINE

## 2023-03-06 PROCEDURE — 36415 COLL VENOUS BLD VENIPUNCTURE: CPT | Mod: PO | Performed by: FAMILY MEDICINE

## 2023-03-10 ENCOUNTER — TELEPHONE (OUTPATIENT)
Dept: GASTROENTEROLOGY | Facility: CLINIC | Age: 48
End: 2023-03-10
Payer: OTHER GOVERNMENT

## 2023-03-22 ENCOUNTER — OFFICE VISIT (OUTPATIENT)
Dept: OPTOMETRY | Facility: CLINIC | Age: 48
End: 2023-03-22
Payer: OTHER GOVERNMENT

## 2023-03-22 DIAGNOSIS — Z13.5 GLAUCOMA SCREENING: ICD-10-CM

## 2023-03-22 DIAGNOSIS — D31.31 CHOROIDAL NEVUS, RIGHT: ICD-10-CM

## 2023-03-22 DIAGNOSIS — H52.203 MYOPIA WITH ASTIGMATISM AND PRESBYOPIA, BILATERAL: ICD-10-CM

## 2023-03-22 DIAGNOSIS — H52.13 MYOPIA WITH ASTIGMATISM AND PRESBYOPIA, BILATERAL: ICD-10-CM

## 2023-03-22 DIAGNOSIS — Z01.00 EXAMINATION OF EYES AND VISION: Primary | ICD-10-CM

## 2023-03-22 DIAGNOSIS — H43.393 VITREOUS FLOATERS, BILATERAL: ICD-10-CM

## 2023-03-22 DIAGNOSIS — H52.4 MYOPIA WITH ASTIGMATISM AND PRESBYOPIA, BILATERAL: ICD-10-CM

## 2023-03-22 PROCEDURE — 92014 COMPRE OPH EXAM EST PT 1/>: CPT | Mod: S$PBB,,, | Performed by: OPTOMETRIST

## 2023-03-22 PROCEDURE — 92015 PR REFRACTION: ICD-10-PCS | Mod: ,,, | Performed by: OPTOMETRIST

## 2023-03-22 PROCEDURE — 99999 PR PBB SHADOW E&M-EST. PATIENT-LVL III: ICD-10-PCS | Mod: PBBFAC,,, | Performed by: OPTOMETRIST

## 2023-03-22 PROCEDURE — 99999 PR PBB SHADOW E&M-EST. PATIENT-LVL III: CPT | Mod: PBBFAC,,, | Performed by: OPTOMETRIST

## 2023-03-22 PROCEDURE — 92014 PR EYE EXAM, EST PATIENT,COMPREHESV: ICD-10-PCS | Mod: S$PBB,,, | Performed by: OPTOMETRIST

## 2023-03-22 PROCEDURE — 99213 OFFICE O/P EST LOW 20 MIN: CPT | Mod: PBBFAC,PO | Performed by: OPTOMETRIST

## 2023-03-22 PROCEDURE — 92015 DETERMINE REFRACTIVE STATE: CPT | Mod: ,,, | Performed by: OPTOMETRIST

## 2023-03-22 NOTE — PATIENT INSTRUCTIONS
"DRY EYES -- BURNING OR ROBINSON SYMPTOMS:  Use Over The Counter artificial tears as needed for dry eye symptoms.   Some common brands include:  Systane, Optive, Refresh, and Thera-Tears.  These drops can be used as frequently as desired, but may be most helpful use during long periods of concentrated work.  For example, reading / working at the computer. Start with 3-4x per day.     Nighttime Ophthalmic gel or ointments are available: Refresh PM, Genteal, and Lacrilube.    Avoid drops that "get redness out" (Visine, Murine, Clear Eyes), as these may contain medication that could further irritate the eyes, especially with chronic use.    ALLERGY EYES -- ITCHING SYMPTOMS:  Over the counter medications include--Pataday, Zaditor, and Alaway.  Use as directed 1-2 drops daily for symptoms of itching / watering eyes.  These drops will not help for dry eye or exposure symptoms.    REDNESS RELIEF:  Lumify---is a good redness reliever that will not cause irritation if used chronically.        FLASHES / FLOATERS / POSTERIOR VITREOUS DETACHMENT    Call the clinic if you have any further changes in symptoms.  Including:  Increased numbers of floaters or flashing lights, dimness or darkness that moves through or stays constant in your vision, or any pain in the eye (s).    You may sometimes see small specks or clouds moving in your field of vision.  They are called FLOATERS.  You can often see them when looking at a plain background, like a blank wall or blue clifton.  Floaters are actually tiny clumps of gel or cells inside the VITREOUS, the clear jelly-like fluid that fills the inside of your eye.    While these objects look like they are in front of your eye, they are actually floating inside.  What you see are the shadows they cast on the RETINA, the nerve layer at the back of the eye that senses light and allows you to see.      POSTERIOR VITREOUS DETACHMENT    The appearance of new floaters may be alarming.  If you suddenly " develop new floaters, you should contact your eye care professional  right away.    The retina can tear if the shrinking vitreous pulls away from the wall of the eye.  This sometimes causes a small amount of bleeding in the eye that may appear as new floaters.    A torn retina is always a serious problem, since it can lead to a retinal detachment.  You should see your eye care professional as soon as possible if:    even one new floater appears suddenly;  you see sudden flashes of light;  you notice other symptoms, like the loss of side vision, or a curtain closes down in your vision        POSTERIOR VITREOUS DETACHMENT is more common for people who:    are nearsighted;  have had cataract surgery;  have had YAG laser surgery of the eye;  have had inflammation inside the eye;  are over age 60.      While some floaters may remain visible, many of them will fade over time and become less noticeable.  Even if you've had some floaters for years, you should have your eyes checked as soon as possible if you notice new ones.    FLASHING LIGHTS    When the vitreous gel rubs or pulls on the retina, you may see what look like flashing lights or lightning streaks.  These flashes can appear off and on for several weeks or months.      Some people experience flashes of light that appear as jagged lines or heat waves in both eyes, lasting 10-20 minutes.  These flashes are caused by a spasm of blood vessels in the brain, which is called a migraine.    If a headache follows these flashes, it's called a migraine headache.  If   no headache occurs, these flashes are called Ophthalmic or Ocular Migraine.

## 2023-03-22 NOTE — PROGRESS NOTES
HPI    Dle- 08/14/2020    Pt here for routine eye exam. Pt sts changes to distance va. Denies   flashes/floaters/eye pain. No gtts.     Agree above  Slight changes distance, still has old specs doing well  Notes slightly reduced at near ---most tasks near uncorrected     Last edited by LISET Blanchard, OD on 3/22/2023  4:43 PM.        ROS    Negative for: Constitutional, Gastrointestinal, Neurological, Skin,   Genitourinary, Musculoskeletal, HENT, Endocrine, Cardiovascular, Eyes,   Respiratory, Psychiatric, Allergic/Imm, Heme/Lymph  Last edited by LISET Blanchard, OD on 3/22/2023  4:43 PM.        Assessment /Plan     For exam results, see Encounter Report.    Examination of eyes and vision    Vitreous floaters, bilateral    Choroidal nevus, right    Glaucoma screening    Myopia with astigmatism and presbyopia, bilateral      Ocular health exam OU   Mild OU w/ low myopia ---RD precautions given and reviewed. Patient knows to call/ message if any further changes in symptoms occur.  Flat C nevus ~ 2 DD in superior nasal OD, monitor   Not suspect   Update specs rx, gave copy for distance only --ok uncorrected for near     Discussed and educated patient on current findings /plan.  RTC 1 year, prn if any changes / issues

## 2023-04-04 ENCOUNTER — TELEPHONE (OUTPATIENT)
Dept: GASTROENTEROLOGY | Facility: CLINIC | Age: 48
End: 2023-04-04
Payer: OTHER GOVERNMENT

## 2023-04-04 ENCOUNTER — PATIENT MESSAGE (OUTPATIENT)
Dept: GASTROENTEROLOGY | Facility: CLINIC | Age: 48
End: 2023-04-04
Payer: OTHER GOVERNMENT

## 2023-04-06 ENCOUNTER — PATIENT MESSAGE (OUTPATIENT)
Dept: GASTROENTEROLOGY | Facility: CLINIC | Age: 48
End: 2023-04-06
Payer: OTHER GOVERNMENT

## 2023-04-10 ENCOUNTER — PATIENT MESSAGE (OUTPATIENT)
Dept: GASTROENTEROLOGY | Facility: CLINIC | Age: 48
End: 2023-04-10
Payer: OTHER GOVERNMENT

## 2023-04-14 ENCOUNTER — TELEPHONE (OUTPATIENT)
Dept: ORTHOPEDICS | Facility: CLINIC | Age: 48
End: 2023-04-14

## 2023-04-14 DIAGNOSIS — E88.810 METABOLIC SYNDROME: Primary | ICD-10-CM

## 2023-04-14 NOTE — TELEPHONE ENCOUNTER
47-year-old male well known to me.  He has a history of elevated triglycerides.  He is a family history of diabetes mellitus and cardiac disease.  Has signs and symptoms consistent with metabolic syndrome.  He has an elevated BMI of 27.  We will initiate treatment with Trulicity 3mg subcutaneous weekly.  Prescription sent to his local pharmacy today.  This was prescribed in lieu of Ozempic which was denied by [employer] insurance.  We will attempt trial of Trulicity to assess his response.  If it is not effective, we will we order Ozempic again.

## 2023-04-21 ENCOUNTER — OFFICE VISIT (OUTPATIENT)
Dept: ORTHOPEDICS | Facility: CLINIC | Age: 48
End: 2023-04-21
Payer: OTHER GOVERNMENT

## 2023-04-21 DIAGNOSIS — M17.11 PRIMARY OSTEOARTHRITIS OF RIGHT KNEE: ICD-10-CM

## 2023-04-21 DIAGNOSIS — M17.12 PRIMARY OSTEOARTHRITIS OF LEFT KNEE: Primary | ICD-10-CM

## 2023-04-21 PROCEDURE — 99499 NO LOS: ICD-10-PCS | Mod: S$GLB,,, | Performed by: PHYSICIAN ASSISTANT

## 2023-04-21 PROCEDURE — 99499 UNLISTED E&M SERVICE: CPT | Mod: S$GLB,,, | Performed by: PHYSICIAN ASSISTANT

## 2023-04-21 PROCEDURE — 20610 DRAIN/INJ JOINT/BURSA W/O US: CPT | Mod: 50,S$GLB,, | Performed by: PHYSICIAN ASSISTANT

## 2023-04-21 PROCEDURE — 20610 LARGE JOINT ASPIRATION/INJECTION: R KNEE: ICD-10-PCS | Mod: 50,S$GLB,, | Performed by: PHYSICIAN ASSISTANT

## 2023-04-21 NOTE — PROCEDURES
Large Joint Aspiration/Injection: R knee    Date/Time: 4/21/2023 1:00 PM  Performed by: Dez Cohen PA-C  Authorized by: Dez Cohen PA-C     Consent Done?:  Yes (Verbal)  Indications:  Pain and arthritis  Site marked: the procedure site was marked    Timeout: prior to procedure the correct patient, procedure, and site was verified    Prep: patient was prepped and draped in usual sterile fashion      Local anesthesia used?: Yes    Local anesthetic:  Lidocaine 2% with epinephrine    Details:  Needle Size:  25 G and 20 G  Ultrasonic Guidance for needle placement?: No    Location:  Knee  Site:  R knee  Medications:  48 mg hylan g-f 20 48 mg/6 mL  Patient tolerance:  Patient tolerated the procedure well with no immediate complications  Large Joint Aspiration/Injection: L knee    Date/Time: 4/21/2023 1:00 PM  Performed by: Dez Cohen PA-C  Authorized by: Dez Cohen PA-C     Consent Done?:  Yes (Verbal)  Indications:  Arthritis and pain  Site marked: the procedure site was marked    Timeout: prior to procedure the correct patient, procedure, and site was verified    Prep: patient was prepped and draped in usual sterile fashion      Local anesthesia used?: Yes    Local anesthetic:  Lidocaine 2% with epinephrine    Details:  Needle Size:  25 G and 20 G  Ultrasonic Guidance for needle placement?: No    Location:  Knee  Site:  L knee  Medications:  48 mg hylan g-f 20 48 mg/6 mL  Patient tolerance:  Patient tolerated the procedure well with no immediate complications

## 2023-04-21 NOTE — PROGRESS NOTES
[facility] ORTHOPEDICS  [address]  [place] [address]  Phone: [phone]   [phone]    Patient's PCP: Dominic Godfrey MD  Referring Provider: No ref. provider found    Subjective:      Chief Complaint:   Chief Complaint   Patient presents with    Left Knee - Pain     Patient is here for bilateral knee Synvisc injections.     Right Knee - Pain       Past Medical History:   Diagnosis Date    AR (allergic rhinitis)     Bigeminy     palpitations - controlled with verapamil    KATELYNN (obstructive sleep apnea)     PPD positive, treated     Skin tag     Right upper eyelid    Unspecified hemorrhoids        Past Surgical History:   Procedure Laterality Date    APPENDECTOMY  1998    Dr. Pacheco    ARTHROSCOPY OF KNEE Left 01/2020    [facility]. [facility]    CLOSED REDUCTION AND PERCUTANEOUS PINNING (CRPP) OF WRIST Right 09/14/2020    Procedure: CLOSED REDUCTION, WRIST, WITH PERCUTANEOUS PINNING;  Surgeon: Malvin Hernández Jr., MD;  Location: [facility] OR;  Service: Orthopedics;  Laterality: Right;    COLONOSCOPY N/A 09/02/2022    Procedure: COLONOSCOPY  with banding possible;  Surgeon: Fred Solis MD;  Location: [facility] ENDO;  Service: Endoscopy;  Laterality: N/A;    FINGER TENDON REPAIR Right     Dr. Marcus, [facility] Rupture    REPAIR OF TORSION OF TESTICLE Left     Dr. Pickens, childhood    ROTATOR CUFF REPAIR Right 04/2018    Yakov Osorio / SAD with distal clavicle resection without RTC repair    ROTATOR CUFF REPAIR Left 07/2016    Yakov Osorio / RTC repair with SAD and distal clavicle resection    SEPTORHINOPLASTY  2006    Dr. Cunningham, [facility]    SEPTORHINOPLASTY  2003    Efrain Morales /  with Cautery assisted palate stiffening    ULNAR [facility]SPOS[facility] Right [facility]    VASECTOMY  10/2019    [facility]       Current Outpatient Medications   Medication Sig    pantoprazole (PROTONIX) 40 MG tablet Take 1 tablet (40 mg total) by mouth once  daily. (Patient not taking: Reported on 3/22/2023)    verapamiL (VERELAN) 120 MG C24P TAKE 1 CAPSULE DAILY     No current facility-administered medications for this visit.     Facility-Administered Medications Ordered in Other Visits   Medication    triamcinolone acetonide injection 40 mg       Review of patient's allergies indicates:  No Known Allergies    Family History   Problem Relation Age of Onset    Diabetes Mother     COPD Father         adair pitt event    Cancer Maternal Grandmother         breast    Macular degeneration Maternal Grandmother     Amblyopia Neg Hx     Blindness Neg Hx     Cataracts Neg Hx     Glaucoma Neg Hx     Hypertension Neg Hx     Retinal detachment Neg Hx     Strabismus Neg Hx     Stroke Neg Hx     Thyroid disease Neg Hx        Social History     Socioeconomic History    Marital status:      Spouse name: Lauryn    Number of children: 3    Years of education: 6    Highest education level: Master's degree (e.g., MA, MS, Rommel, MEd, MSW, MARIZA)   Occupational History    Occupation: Ortho Physician Assistant     Employer: [employer]     Comment: [employer]    Occupation: LTC [employer]     Employer: [employer]     Comment: [employer]   Tobacco Use    Smoking status: Never    Smokeless tobacco: Never   Substance and Sexual Activity    Alcohol use: Yes     Comment: Occasional; 1-2 drinks per month    Drug use: No    Sexual activity: Yes     Partners: Female     Birth control/protection: Surgical     Social Determinants of Health     Financial Resource Strain: Medium Risk    Difficulty of Paying Living Expenses: Somewhat hard   Food Insecurity: No Food Insecurity    Worried About Running Out of Food in the Last Year: Never true    Ran Out of Food in the Last Year: Never true   Transportation Needs: No Transportation Needs    Lack of Transportation (Medical): No    Lack of Transportation (Non-Medical): No   Physical Activity: Sufficiently Active     Days of Exercise per Week: 5 days    Minutes of Exercise per Session: 60 min   Stress: No Stress Concern Present    Feeling of Stress : Only a little   Social Connections: Unknown    Frequency of Communication with Friends and Family: Once a week    Frequency of Social Gatherings with Friends and Family: Once a week    Active Member of Clubs or Organizations: Yes    Attends Club or Organization Meetings: More than 4 times per year    Marital Status:    Housing Stability: Low Risk     Unable to Pay for Housing in the Last Year: No    Number of Places Lived in the Last Year: 1    Unstable Housing in the Last Year: No       History of present illness:  Hany comes in today for follow-up for his bilateral knee osteoarthritis.  He is here today for bilateral viscosupplementation injections.  He has had positive relief with these in the past.  Last injections were 6 months ago.    Review of Systems:    Constitutional: Negative for chills, fever and weight loss.   HENT: Negative for congestion.    Eyes: Negative for discharge and redness.   Respiratory: Negative for cough and shortness of breath.    Cardiovascular: Negative for chest pain.   Gastrointestinal: Negative for nausea and vomiting.   Musculoskeletal: See HPI.   Skin: Negative for rash.   Neurological: Negative for headaches.   Endo/Heme/Allergies: Does not bruise/bleed easily.   Psychiatric/Behavioral: The patient is not nervous/anxious.    All other systems reviewed and are negative.       Objective:      Physical Examination:    Vital Signs:  There were no vitals filed for this visit.    There is no height or weight on file to calculate BMI.    This a well-developed, well nourished patient in no acute distress.  They are alert and oriented and cooperative to examination.     Bilateral knee exam: Bilateral knee exam is similar to where he was on his last visit 6 months ago. Skin to his bilateral knees is clean dry and intact.  There is no erythema or  ecchymosis.  There are no signs or symptoms of infection.  Patient is neurovascularly intact throughout bilateral lower extremities.  Bilateral calves are soft and nontender.  Bilateral knee range of motion is approximately 0-130 degrees.  Both knees are stable to varus and valgus stresses while held in extension.  Patient is diffusely tender along the medial aspect of his bilateral knees.  He does have crepitus with range of motioning of his bilateral knees.  He is able weight bear as tolerated on his bilateral lower extremities with a mildly antalgic gait.  He has a negative straight leg raise bilaterally.     Pertinent New Results:        XRAY Report / Interpretation:   No new radiographs were taken on today's clinic visit.      Assessment:       1. Primary osteoarthritis of left knee    2. Primary osteoarthritis of right knee      Plan:     Primary osteoarthritis of left knee  -     Large Joint Aspiration/Injection: L knee    Primary osteoarthritis of right knee  -     Large Joint Aspiration/Injection: R knee        Follow up in about 6 months (around 10/21/2023) for Injec. f/up bilateral knee Synvisc 4/21/23.    I anesthetized his bilateral knees anterolateral aspect with approximately 5 cc of 1% lidocaine plain respectively.  I then injected through the anesthetized area Synvisc-One viscosupplementation injection bilaterally.  He tolerated this well.  He will follow up with us on an as-needed basis if any issues or concerns arise.    Also, did review results of his left hip and left shoulder formed about 6 months ago.  He is found to have a recurrent rotator cuff tendon tear to the left shoulder.  He does have a previous history of a left shoulder arthroscopy with rotator cuff repair about 8-10 years ago with Dr. Pineda at [facility].  This has generally done well for him until recently.  I discussed the results with him and treatment options today.  He is very familiar with rehabilitation exercises from his  previous surgery.  He will start doing those as part of a home exercise regimen.  We will see how he responds and make further treatment recommendations from there.  For his left hip, he was found to have a labral tear.  This stems from an airborne jump he did during his service with the [employer].  He had an awkward landing causing an abnormal torque/range of motion to his left hip causing pain.  He does have symptoms consistent with labral tear with certain flexion/external rotational maneuvers of the left hip.  Currently, the discomfort associated with the hip is tolerable for him.  We will continue to observe/monitor this.  If this becomes more painful/symptomatic, consideration for evaluation for potential hip arthroscopy may be warranted.        Dez Cohen, EVANS, PA-C    This note was created using Whisk voice recognition software that occasionally misinterprets words or phrases.

## 2023-06-02 ENCOUNTER — ANESTHESIA EVENT (OUTPATIENT)
Dept: ENDOSCOPY | Facility: HOSPITAL | Age: 48
End: 2023-06-02
Payer: OTHER GOVERNMENT

## 2023-06-02 ENCOUNTER — HOSPITAL ENCOUNTER (OUTPATIENT)
Facility: HOSPITAL | Age: 48
Discharge: HOME | End: 2023-06-02
Attending: INTERNAL MEDICINE | Admitting: INTERNAL MEDICINE
Payer: OTHER GOVERNMENT

## 2023-06-02 ENCOUNTER — ANESTHESIA (OUTPATIENT)
Dept: ENDOSCOPY | Facility: HOSPITAL | Age: 48
End: 2023-06-02
Payer: OTHER GOVERNMENT

## 2023-06-02 VITALS
OXYGEN SATURATION: 100 % | DIASTOLIC BLOOD PRESSURE: 69 MMHG | BODY MASS INDEX: 27.96 KG/M2 | HEIGHT: 73 IN | SYSTOLIC BLOOD PRESSURE: 111 MMHG | HEART RATE: 62 BPM | RESPIRATION RATE: 16 BRPM | WEIGHT: 211 LBS | TEMPERATURE: 98 F

## 2023-06-02 DIAGNOSIS — K21.9 GASTROESOPHAGEAL REFLUX DISEASE, UNSPECIFIED WHETHER ESOPHAGITIS PRESENT: Primary | ICD-10-CM

## 2023-06-02 DIAGNOSIS — K21.9 GERD (GASTROESOPHAGEAL REFLUX DISEASE): ICD-10-CM

## 2023-06-02 PROCEDURE — D9220A PRA ANESTHESIA: Mod: ANES,,, | Performed by: ANESTHESIOLOGY

## 2023-06-02 PROCEDURE — D9220A PRA ANESTHESIA: Mod: CRNA,,, | Performed by: NURSE ANESTHETIST, CERTIFIED REGISTERED

## 2023-06-02 PROCEDURE — 63600175 PHARM REV CODE 636 W HCPCS: Mod: PO | Performed by: INTERNAL MEDICINE

## 2023-06-02 PROCEDURE — 88342 IMHCHEM/IMCYTCHM 1ST ANTB: CPT | Mod: 26,,, | Performed by: STUDENT IN AN ORGANIZED HEALTH CARE EDUCATION/TRAINING PROGRAM

## 2023-06-02 PROCEDURE — 00731 ANES UPR GI NDSC PX NOS: CPT | Mod: PO | Performed by: INTERNAL MEDICINE

## 2023-06-02 PROCEDURE — D9220A PRA ANESTHESIA: ICD-10-PCS | Mod: ANES,,, | Performed by: ANESTHESIOLOGY

## 2023-06-02 PROCEDURE — 88342 CHG IMMUNOCYTOCHEMISTRY: ICD-10-PCS | Mod: 26,,, | Performed by: STUDENT IN AN ORGANIZED HEALTH CARE EDUCATION/TRAINING PROGRAM

## 2023-06-02 PROCEDURE — 88305 TISSUE EXAM BY PATHOLOGIST: CPT | Mod: 26,,, | Performed by: STUDENT IN AN ORGANIZED HEALTH CARE EDUCATION/TRAINING PROGRAM

## 2023-06-02 PROCEDURE — 43239 PR EGD, FLEX, W/BIOPSY, SGL/MULTI: ICD-10-PCS | Mod: ,,, | Performed by: INTERNAL MEDICINE

## 2023-06-02 PROCEDURE — 43239 EGD BIOPSY SINGLE/MULTIPLE: CPT | Mod: ,,, | Performed by: INTERNAL MEDICINE

## 2023-06-02 PROCEDURE — 25000003 PHARM REV CODE 250: Mod: PO | Performed by: NURSE ANESTHETIST, CERTIFIED REGISTERED

## 2023-06-02 PROCEDURE — 63600175 PHARM REV CODE 636 W HCPCS: Mod: PO | Performed by: NURSE ANESTHETIST, CERTIFIED REGISTERED

## 2023-06-02 PROCEDURE — D9220A PRA ANESTHESIA: ICD-10-PCS | Mod: CRNA,,, | Performed by: NURSE ANESTHETIST, CERTIFIED REGISTERED

## 2023-06-02 PROCEDURE — 37000008 HC ANESTHESIA 1ST 15 MINUTES: Mod: PO | Performed by: INTERNAL MEDICINE

## 2023-06-02 PROCEDURE — 88305 TISSUE EXAM BY PATHOLOGIST: CPT | Mod: PO | Performed by: STUDENT IN AN ORGANIZED HEALTH CARE EDUCATION/TRAINING PROGRAM

## 2023-06-02 PROCEDURE — 27201012 HC FORCEPS, HOT/COLD, DISP: Mod: PO | Performed by: INTERNAL MEDICINE

## 2023-06-02 PROCEDURE — 37000009 HC ANESTHESIA EA ADD 15 MINS: Mod: PO | Performed by: INTERNAL MEDICINE

## 2023-06-02 PROCEDURE — 43239 EGD BIOPSY SINGLE/MULTIPLE: CPT | Mod: PO | Performed by: INTERNAL MEDICINE

## 2023-06-02 PROCEDURE — 88305 TISSUE EXAM BY PATHOLOGIST: ICD-10-PCS | Mod: 26,,, | Performed by: STUDENT IN AN ORGANIZED HEALTH CARE EDUCATION/TRAINING PROGRAM

## 2023-06-02 PROCEDURE — 88342 IMHCHEM/IMCYTCHM 1ST ANTB: CPT | Mod: PO | Performed by: STUDENT IN AN ORGANIZED HEALTH CARE EDUCATION/TRAINING PROGRAM

## 2023-06-02 RX ORDER — SODIUM CHLORIDE 0.9 % (FLUSH) 0.9 %
10 SYRINGE (ML) INJECTION EVERY 6 HOURS PRN
Status: DISCONTINUED | OUTPATIENT
Start: 2023-06-02 | End: 2023-06-02 | Stop reason: HOSPADM

## 2023-06-02 RX ORDER — LIDOCAINE HYDROCHLORIDE 20 MG/ML
INJECTION INTRAVENOUS
Status: DISCONTINUED | OUTPATIENT
Start: 2023-06-02 | End: 2023-06-02

## 2023-06-02 RX ORDER — SODIUM CHLORIDE, SODIUM LACTATE, POTASSIUM CHLORIDE, CALCIUM CHLORIDE 600; 310; 30; 20 MG/100ML; MG/100ML; MG/100ML; MG/100ML
INJECTION, SOLUTION INTRAVENOUS CONTINUOUS
Status: DISCONTINUED | OUTPATIENT
Start: 2023-06-02 | End: 2023-06-02 | Stop reason: HOSPADM

## 2023-06-02 RX ORDER — PROPOFOL 10 MG/ML
VIAL (ML) INTRAVENOUS
Status: DISCONTINUED | OUTPATIENT
Start: 2023-06-02 | End: 2023-06-02

## 2023-06-02 RX ORDER — PANTOPRAZOLE SODIUM 40 MG/1
40 TABLET, DELAYED RELEASE ORAL 2 TIMES DAILY
Qty: 90 TABLET | Refills: 1 | Status: SHIPPED | OUTPATIENT
Start: 2023-06-02 | End: 2023-10-26 | Stop reason: SDUPTHER

## 2023-06-02 RX ADMIN — PROPOFOL 50 MG: 10 INJECTION, EMULSION INTRAVENOUS at 10:06

## 2023-06-02 RX ADMIN — LIDOCAINE HYDROCHLORIDE 100 MG: 20 INJECTION INTRAVENOUS at 10:06

## 2023-06-02 RX ADMIN — PROPOFOL 100 MG: 10 INJECTION, EMULSION INTRAVENOUS at 10:06

## 2023-06-02 RX ADMIN — SODIUM CHLORIDE, POTASSIUM CHLORIDE, SODIUM LACTATE AND CALCIUM CHLORIDE: 600; 310; 30; 20 INJECTION, SOLUTION INTRAVENOUS at 09:06

## 2023-06-02 NOTE — TRANSFER OF CARE
"Anesthesia Transfer of Care Note    Patient: Hany Caba    Procedure(s) Performed: Procedure(s) (LRB):  ESOPHAGOGASTRODUODENOSCOPY (EGD) (N/A)    Patient location: PACU    Anesthesia Type: general    Transport from OR: Transported from OR on room air with adequate spontaneous ventilation    Post pain: adequate analgesia    Post assessment: no apparent anesthetic complications and tolerated procedure well    Post vital signs: stable    Level of consciousness: sedated    Nausea/Vomiting: no nausea/vomiting    Complications: none    Transfer of care protocol was followed      Last vitals:   Visit Vitals  /64   Pulse 61   Temp 36.7 °C (98 °F) (Skin)   Resp 18   Ht 6' 1" (1.854 m)   Wt 95.7 kg (211 lb)   SpO2 98%   BMI 27.84 kg/m²     "

## 2023-06-02 NOTE — ANESTHESIA PREPROCEDURE EVALUATION
06/02/2023  Hany Caba is a 47 y.o., male.      Pre-op Assessment    I have reviewed the Patient Summary Reports.     I have reviewed the Nursing Notes. I have reviewed the NPO Status.   I have reviewed the Medications.     Review of Systems  Anesthesia Hx:  No problems with previous Anesthesia    Social:  Non-Smoker    Cardiovascular:   Denies Hypertension.  Denies MI.  Denies CAD.    Denies CABG/stent.   Denies Angina.    Pulmonary:   Denies COPD.  Denies Asthma.  Denies Recent URI.    Renal/:   Denies Chronic Renal Disease.     Hepatic/GI:   Denies GERD. Denies Liver Disease.    Neurological:   Denies TIA. Denies CVA. Denies Seizures.    Endocrine:   Denies Diabetes. Denies Hypothyroidism.    Psych:   Denies Psychiatric History.          Physical Exam  General: Well nourished, Cooperative, Alert and Oriented    Airway:  Mallampati: II / II  Mouth Opening: Normal  TM Distance: 4 - 6 cm  Tongue: Normal    Dental:  Intact    Chest/Lungs:  Clear to auscultation, Normal Respiratory Rate    Heart:  Rate: Normal  Rhythm: Regular Rhythm  Sounds: Normal        Anesthesia Plan  Type of Anesthesia, risks & benefits discussed:    Anesthesia Type: Gen Natural Airway  Intra-op Monitoring Plan: Standard ASA Monitors  Induction:  IV  Informed Consent: Informed consent signed with the Patient and all parties understand the risks and agree with anesthesia plan.  All questions answered.   ASA Score: 2    Ready For Surgery From Anesthesia Perspective.     .

## 2023-06-02 NOTE — PROVATION PATIENT INSTRUCTIONS
Discharge Summary/Instructions after an Endoscopic Procedure  Patient Name: Hany Caba  Patient MRN: [id number]  Patient YOB: 1975  Friday, June 2, 2023  Ron Nieto MD  Dear patient,  As a result of recent federal legislation (The Federal Cures Act), you may   receive lab or pathology results from your procedure in your [facility]   account before your physician is able to contact you. Your physician or   their representative will relay the results to you with their   recommendations at their soonest availability.  Thank you,  RESTRICTIONS:  During your procedure today, you received medications for sedation.  These   medications may affect your judgment, balance and coordination.  Therefore,   for 24 hours, you have the following restrictions:   - DO NOT drive a car, operate machinery, make legal/financial decisions,   sign important papers or drink alcohol.    ACTIVITY:  Today: no heavy lifting, straining or running due to procedural   sedation/anesthesia.  The following day: return to full activity including work.  DIET:  Eat and drink normally unless instructed otherwise.     TREATMENT FOR COMMON SIDE EFFECTS:  - Mild abdominal pain, nausea, belching, bloating or excessive gas:  rest,   eat lightly and use a heating pad.  - Sore Throat: treat with throat lozenges and/or gargle with warm salt   water.  - Because air was used during the procedure, expelling large amounts of air   from your rectum or belching is normal.  - If a bowel prep was taken, you may not have a bowel movement for 1-3 days.    This is normal.  SYMPTOMS TO WATCH FOR AND REPORT TO YOUR PHYSICIAN:  1. Abdominal pain or bloating, other than gas cramps.  2. Chest pain.  3. Back pain.  4. Signs of infection such as: chills or fever occurring within 24 hours   after the procedure.  5. Rectal bleeding, which would show as bright red, maroon, or black stools.   (A tablespoon of blood from the rectum is not serious, especially if    hemorrhoids are present.)  6. Vomiting.  7. Weakness or dizziness.  GO DIRECTLY TO THE NEAREST EMERGENCY ROOM IF YOU HAVE ANY OF THE FOLLOWING:      Difficulty breathing              Chills and/or fever over 101 F   Persistent vomiting and/or vomiting blood   Severe abdominal pain   Severe chest pain   Black, tarry stools   Bleeding- more than one tablespoon   Any other symptom or condition that you feel may need urgent attention  Your doctor recommends these additional instructions:  If any biopsies were taken, your doctors clinic will contact you in 1 to 2   weeks with any results.  You are being discharged to home.   Advance your diet as tolerated.   Continue your present medications.   We are waiting for your pathology results.   Take Protonix (pantoprazole) 40 mg by mouth twice a day for eight weeks.   Your physician has recommended a repeat upper endoscopy in eight weeks to   evaluate the response to therapy.  For questions, problems or results please call your physician - Ron Nieto MD at Work:  [phone].  EMERGENCY PHONE NUMBER: [phone], LAB RESULTS: [phone]  IF A COMPLICATION OR EMERGENCY SITUATION ARISES AND YOU ARE UNABLE TO REACH   YOUR PHYSICIAN - GO DIRECTLY TO THE EMERGENCY ROOM.  ___________________________________________  Nurse Signature  ___________________________________________  Patient/Designated Responsible Party Signature  Ron Nieto MD  6/2/2023 10:34:22 AM  This report has been verified and signed electronically.  Dear patient,  As a result of recent federal legislation (The Federal Cures Act), you may   receive lab or pathology results from your procedure in your [facility]   account before your physician is able to contact you. Your physician or   their representative will relay the results to you with their   recommendations at their soonest availability.  Thank you.  PROVATION

## 2023-06-02 NOTE — H&P
History & Physical - Short Stay  Gastroenterology    SUBJECTIVE:     Procedure: EGD    Chief Complaint/Indication for Procedure: Reflux    PTA Medications   Medication Sig    verapamiL (VERELAN) 120 MG C24P TAKE 1 CAPSULE DAILY    pantoprazole (PROTONIX) 40 MG tablet Take 1 tablet (40 mg total) by mouth once daily. (Patient not taking: Reported on 3/22/2023)       Review of patient's allergies indicates:  No Known Allergies     Past Medical History:   Diagnosis Date    AR (allergic rhinitis)     Bigeminy     palpitations - controlled with verapamil    KATELYNN (obstructive sleep apnea)     PPD positive, treated     Skin tag     Right upper eyelid    Unspecified hemorrhoids      Past Surgical History:   Procedure Laterality Date    APPENDECTOMY  1998    Dr. Pacheco    ARTHROSCOPY OF KNEE Left 01/2020    Dr. [facility] Medial Menis[facility]    CLOSED REDUCTION AND PERCUTANEOUS PINNING (CRPP) OF WRIST Right 09/14/2020    Procedure: CLOSED REDUCTION, WRIST, WITH PERCUTANEOUS PINNING;  Surgeon: Malvin Hernández Jr., MD;  Location: [facility] OR;  Service: Orthopedics;  Laterality: Right;    COLONOSCOPY N/A 09/02/2022    Procedure: COLONOSCOPY  with banding possible;  Surgeon: Fred Solis MD;  Location: [facility] ENDO;  Service: Endoscopy;  Laterality: N/A;    FINGER TENDON REPAIR Right     Dr. Marcus, [facility] Rupture    REPAIR OF TORSION OF TESTICLE Left     Dr. Pickens, childhood    ROTATOR CUFF REPAIR Right 04/2018    Yakov Osorio / SAD with distal clavicle resection without RTC repair    ROTATOR CUFF REPAIR Left 07/2016    Yakov Osorio / RTC repair with SAD and distal clavicle resection    SEPTORHINOPLASTY  2006    Dr. Cunningham, [facility]    SEPTORHINOPLASTY  2003    Efrain Morales /  with Cautery assisted palate stiffening    ULNAR [facility] Right 1998    [facility]    VASECTOMY  10/2019    [facility]     Family History   Problem Relation Age of Onset    Diabetes Mother     COPD Father          adair cariac event    Cancer Maternal Grandmother         breast    Macular degeneration Maternal Grandmother     Amblyopia Neg Hx     Blindness Neg Hx     Cataracts Neg Hx     Glaucoma Neg Hx     Hypertension Neg Hx     Retinal detachment Neg Hx     Strabismus Neg Hx     Stroke Neg Hx     Thyroid disease Neg Hx      Social History     Tobacco Use    Smoking status: Never    Smokeless tobacco: Never   Substance Use Topics    Alcohol use: Yes     Comment: Occasional; 1-2 drinks per month    Drug use: No     OBJECTIVE:     Physical Exam:                                                       GENERAL:  Comfortable, in no acute distress.                                 HEENT EXAM:  Nonicteric.  No adenopathy.  Oropharynx is clear.               NECK:  Supple.                                                               LUNGS:  No labored breathing                                                                       ABDOMEN:  Soft, positive bowel sounds, nontender.  No hepatosplenomegaly or masses.  No rebound or guarding.                                             EXTREMITIES:  No edema.     MENTAL STATUS:  Normal, alert and oriented.    ASSESSMENT/PLAN:     Assessment: Reflux    Plan: EGD

## 2023-06-02 NOTE — ANESTHESIA POSTPROCEDURE EVALUATION
Anesthesia Post Evaluation    Patient: Hany Caba    Procedure(s) Performed: Procedure(s) (LRB):  ESOPHAGOGASTRODUODENOSCOPY (EGD) (N/A)    Final Anesthesia Type: general      Patient location during evaluation: PACU  Patient participation: Yes- Able to Participate  Level of consciousness: awake and alert and oriented  Post-procedure vital signs: reviewed and stable  Pain management: adequate  Airway patency: patent    PONV status at discharge: No PONV  Anesthetic complications: no      Cardiovascular status: blood pressure returned to baseline  Respiratory status: unassisted, spontaneous ventilation and room air  Hydration status: euvolemic  Follow-up not needed.          Vitals Value Taken Time   /64 06/02/23 1035   Temp 36.7 °C (98 °F) 06/02/23 1035   Pulse 61 06/02/23 1035   Resp 18 06/02/23 1035   SpO2 98 % 06/02/23 1035         No case tracking events are documented in the log.      Pain/Salbador Score: Salbador Score: 10 (6/2/2023 10:40 AM)

## 2023-06-13 LAB
FINAL PATHOLOGIC DIAGNOSIS: NORMAL
GROSS: NORMAL
Lab: NORMAL
SUPPLEMENTAL DIAGNOSIS: NORMAL

## 2023-10-26 DIAGNOSIS — K21.9 GASTROESOPHAGEAL REFLUX DISEASE, UNSPECIFIED WHETHER ESOPHAGITIS PRESENT: ICD-10-CM

## 2023-10-26 NOTE — TELEPHONE ENCOUNTER
----- Message from Brennen Mackay sent at 10/26/2023  4:36 PM CDT -----  Regarding: refill  Type:  RX Refill Request    Who Called:  Tia with [facility]  Refill or New Rx:  refill    RX Name and Strength:    pantoprazole (PROTONIX) 40 MG tablet 90 tablet 1 6/2/2023 -   Sig - Route: Take 1 tablet (40 mg total) by mouth 2 (two) times daily. - Oral   Sent to pharmacy as: pantoprazole (PROTONIX) 40 MG tablet   Notes to Pharmacy: Please inactivate all prior scripts with same name and strength including on holds.   E-Prescribing Status: Receipt confirmed by pharmacy (6/2/2023 11:31 AM CDT)     How is the patient currently taking it? (ex. 1XDay):  see above  Is this a 30 day or 90 day RX:  see above    Preferred Pharmacy with phone number:    [facility]  for [place], [place] - [facility] [facility] [address]  Phone: [phone] Fax: [phone]    Local or Mail Order:  Mail Order    Ordering Provider:  Dr Nieto    Best Call Back Number:  [phone]    Additional Information:  pharm calling for refill for pt. Make sure to use [facility]

## 2023-10-30 RX ORDER — PANTOPRAZOLE SODIUM 40 MG/1
40 TABLET, DELAYED RELEASE ORAL 2 TIMES DAILY
Qty: 90 TABLET | Refills: 1 | Status: SHIPPED | OUTPATIENT
Start: 2023-10-30

## 2023-11-06 ENCOUNTER — OFFICE VISIT (OUTPATIENT)
Dept: ORTHOPEDICS | Facility: CLINIC | Age: 48
End: 2023-11-06
Payer: OTHER GOVERNMENT

## 2023-11-06 DIAGNOSIS — M17.12 PRIMARY OSTEOARTHRITIS OF LEFT KNEE: ICD-10-CM

## 2023-11-06 DIAGNOSIS — M17.11 PRIMARY OSTEOARTHRITIS OF RIGHT KNEE: Primary | ICD-10-CM

## 2023-11-06 PROCEDURE — 99499 UNLISTED E&M SERVICE: CPT | Mod: S$GLB,,, | Performed by: PHYSICIAN ASSISTANT

## 2023-11-06 PROCEDURE — 20610 LARGE JOINT ASPIRATION/INJECTION: R KNEE: ICD-10-PCS | Mod: 50,S$GLB,, | Performed by: PHYSICIAN ASSISTANT

## 2023-11-06 PROCEDURE — 20610 DRAIN/INJ JOINT/BURSA W/O US: CPT | Mod: 50,S$GLB,, | Performed by: PHYSICIAN ASSISTANT

## 2023-11-06 PROCEDURE — 99499 NO LOS: ICD-10-PCS | Mod: S$GLB,,, | Performed by: PHYSICIAN ASSISTANT

## 2023-11-06 NOTE — PROGRESS NOTES
[facility] ORTHOPEDICS  [address]  [place] [address]  Phone: [phone]   [phone]    Patient's PCP: Dominic Godfrey MD  Referring Provider: No ref. provider found    Subjective:      Chief Complaint: No chief complaint on file.      Past Medical History:   Diagnosis Date    AR (allergic rhinitis)     Bigeminy     palpitations - controlled with verapamil    KATELYNN (obstructive sleep apnea)     PPD positive, treated     Skin tag     Right upper eyelid    Unspecified hemorrhoids        Past Surgical History:   Procedure Laterality Date    APPENDECTOMY  1998    Dr. Pacheco    ARTHROSCOPY OF KNEE Left 01/2020    Dr. Diaz[facility] / Medial Menis[facility]    CLOSED REDUCTION AND PERCUTANEOUS PINNING (CRPP) OF WRIST Right 09/14/2020    Procedure: CLOSED REDUCTION, WRIST, WITH PERCUTANEOUS PINNING;  Surgeon: Malvin Hernández Jr., MD;  Location: [facility];  Service: Orthopedics;  Laterality: Right;    COLONOSCOPY N/A 09/02/2022    Procedure: COLONOSCOPY  with banding possible;  Surgeon: Fred Solis MD;  Location: [facility];  Service: Endoscopy;  Laterality: N/A;    ESOPHAGOGASTRODUODENOSCOPY N/A 6/2/2023    Procedure: ESOPHAGOGASTRODUODENOSCOPY (EGD);  Surgeon: Ron Nieto MD;  Location: [facility];  Service: Endoscopy;  Laterality: N/A;    FINGER TENDON REPAIR Right     Dr. Marcus, [facility] Rupture    REPAIR OF TORSION OF TESTICLE Left     Dr. Pickens, childhood    ROTATOR CUFF REPAIR Right 04/2018    Yakov Osorio / SAD with distal clavicle resection without RTC repair    ROTATOR CUFF REPAIR Left 07/2016    Yakov Osorio / RTC repair with SAD and distal clavicle resection    SEPTORHINOPLASTY  2006    Dr. Cunningham, [facility]    SEPTORHINOPLASTY  2003    Efrain Morales /  with Cautery assisted palate stiffening    ULNAR [facility]SPOS[facility] Right 1998    [facility]    VASECTOMY  10/2019    [facility]       Current Outpatient Medications   Medication Sig     pantoprazole (PROTONIX) 40 MG tablet Take 1 tablet (40 mg total) by mouth 2 (two) times daily.    verapamiL (VERELAN) 120 MG C24P TAKE 1 CAPSULE DAILY     No current facility-administered medications for this visit.     Facility-Administered Medications Ordered in Other Visits   Medication    triamcinolone acetonide injection 40 mg       Review of patient's allergies indicates:  No Known Allergies    Family History   Problem Relation Age of Onset    Diabetes Mother     COPD Father         suden cariac event    Cancer Maternal Grandmother         breast    Macular degeneration Maternal Grandmother     Amblyopia Neg Hx     Blindness Neg Hx     Cataracts Neg Hx     Glaucoma Neg Hx     Hypertension Neg Hx     Retinal detachment Neg Hx     Strabismus Neg Hx     Stroke Neg Hx     Thyroid disease Neg Hx        Social History     Socioeconomic History    Marital status:      Spouse name: Lauryn    Number of children: 3    Years of education: 6    Highest education level: Master's degree (e.g., MA, MS, Rommel, MEd, MSW, MARIZA)   Occupational History    Occupation: Ortho Physician Assistant     Employer: [employer]     Comment: [employer]    Occupation: LTC [employer]     Employer: [employer]     Comment: [employer] Operations   Tobacco Use    Smoking status: Never    Smokeless tobacco: Never   Substance and Sexual Activity    Alcohol use: Yes     Comment: Occasional; 1-2 drinks per month    Drug use: No    Sexual activity: Yes     Partners: Female     Birth control/protection: Surgical     Social Determinants of Health     Financial Resource Strain: Medium Risk (3/3/2023)    Overall Financial Resource Strain (CARDIA)     Difficulty of Paying Living Expenses: Somewhat hard   Food Insecurity: No Food Insecurity (3/3/2023)    Hunger Vital Sign     Worried About Running Out of Food in the Last Year: Never true     Ran Out of Food in the Last Year: Never true   Transportation Needs: No Transportation  Needs (3/3/2023)    PRAPARE - Transportation     Lack of Transportation (Medical): No     Lack of Transportation (Non-Medical): No   Physical Activity: Sufficiently Active (3/3/2023)    Exercise Vital Sign     Days of Exercise per Week: 5 days     Minutes of Exercise per Session: 60 min   Stress: No Stress Concern Present (3/3/2023)    [employer] [place] [employer] - Occupational Stress Questionnaire     Feeling of Stress : Only a little   Social Connections: Unknown (3/3/2023)    Social Connection and Isolation Panel [NHANES]     Frequency of Communication with Friends and Family: Once a week     Frequency of Social Gatherings with Friends and Family: Once a week     Active Member of Clubs or Organizations: Yes     Attends Club or Organization Meetings: More than 4 times per year     Marital Status:    Housing Stability: Low Risk  (3/3/2023)    Housing Stability Vital Sign     Unable to Pay for Housing in the Last Year: No     Number of Places Lived in the Last Year: 1     Unstable Housing in the Last Year: No       History of present illness:  Hany comes in today for follow-up for his bilateral knee osteoarthritis.  He is here today for bilateral viscosupplementation injections.  He has had positive relief with these in the past.  Last injections were 6.5 months ago.    Review of Systems:    Constitutional: Negative for chills, fever and weight loss.   HENT: Negative for congestion.    Eyes: Negative for discharge and redness.   Respiratory: Negative for cough and shortness of breath.    Cardiovascular: Negative for chest pain.   Gastrointestinal: Negative for nausea and vomiting.   Musculoskeletal: See HPI.   Skin: Negative for rash.   Neurological: Negative for headaches.   Endo/Heme/Allergies: Does not bruise/bleed easily.   Psychiatric/Behavioral: The patient is not nervous/anxious.    All other systems reviewed and are negative.       Objective:      Physical Examination:    Vital Signs:   There were no vitals filed for this visit.    There is no height or weight on file to calculate BMI.    This a well-developed, well nourished patient in no acute distress.  They are alert and oriented and cooperative to examination.     Bilateral knee exam: Bilateral knee exam is similar to where he was on his last visit 6.5 months ago. Skin to his bilateral knees is clean dry and intact.  There is no erythema or ecchymosis.  There are no signs or symptoms of infection.  Patient is neurovascularly intact throughout bilateral lower extremities.  Bilateral calves are soft and nontender.  Bilateral knee range of motion is approximately 0-130 degrees.  Both knees are stable to varus and valgus stresses while held in extension.  Patient is diffusely tender along the medial aspect of his bilateral knees.  He does have crepitus with range of motioning of his bilateral knees.  He is able weight bear as tolerated on his bilateral lower extremities with a mildly antalgic gait.  He has a negative straight leg raise bilaterally.     Pertinent New Results:        XRAY Report / Interpretation:   No new radiographs were taken on today's clinic visit.      Assessment:       1. Primary osteoarthritis of right knee    2. Primary osteoarthritis of left knee      Plan:     Primary osteoarthritis of right knee  -     Large Joint Aspiration/Injection: R knee    Primary osteoarthritis of left knee  -     Large Joint Aspiration/Injection: L knee        No follow-ups on file.    I injected his bilateral knees today via an anterolateral approach with Synvisc-One viscosupplementation injections without complication.  He tolerated these well.  He will follow up with us on an as-needed basis for repeat viscosupplementation and/or corticosteroid injections as these have proven to be efficacious in providing relief for his bilateral knee osteoarthritis.        Dez Cohen, EVANS, PA-C    This note was created using MModal voice  recognition software that occasionally misinterprets words or phrases.

## 2023-11-06 NOTE — PROCEDURES
Large Joint Aspiration/Injection: L knee    Date/Time: 11/6/2023 3:45 PM    Performed by: Dez Cohen PA-C  Authorized by: Dez Coehn PA-C    Consent Done?:  Yes (Verbal)  Indications:  Arthritis and pain  Site marked: the procedure site was marked    Timeout: prior to procedure the correct patient, procedure, and site was verified    Prep: patient was prepped and draped in usual sterile fashion      Local anesthesia used?: Yes    Local anesthetic:  Lidocaine 1% without epinephrine    Details:  Needle Size:  25 G  Ultrasonic Guidance for needle placement?: No    Approach:  Anterolateral  Location:  Knee  Site:  L knee  Medications:  48 mg hylan g-f 20 48 mg/6 mL  Patient tolerance:  Patient tolerated the procedure well with no immediate complications

## 2023-11-06 NOTE — PROCEDURES
Large Joint Aspiration/Injection: R knee    Date/Time: 11/6/2023 3:45 PM    Performed by: Dez Cohen PA-C  Authorized by: Dez Cohen PA-C    Consent Done?:  Yes (Verbal)  Indications:  Pain and arthritis  Site marked: the procedure site was marked    Timeout: prior to procedure the correct patient, procedure, and site was verified    Prep: patient was prepped and draped in usual sterile fashion      Local anesthesia used?: Yes    Local anesthetic:  Lidocaine 1% without epinephrine    Details:  Needle Size:  25 G  Ultrasonic Guidance for needle placement?: No    Approach:  Anterolateral  Location:  Knee  Site:  R knee  Medications:  48 mg hylan g-f 20 48 mg/6 mL  Patient tolerance:  Patient tolerated the procedure well with no immediate complications

## 2023-11-13 DIAGNOSIS — M17.11 PRIMARY OSTEOARTHRITIS OF RIGHT KNEE: Primary | ICD-10-CM

## 2023-11-13 DIAGNOSIS — M17.12 PRIMARY OSTEOARTHRITIS OF LEFT KNEE: ICD-10-CM

## 2023-11-13 RX ORDER — DICLOFENAC SODIUM 30 MG/G
GEL TOPICAL
Qty: 100 G | Refills: 6 | Status: SHIPPED | OUTPATIENT
Start: 2023-11-13 | End: 2024-02-07

## 2023-11-16 ENCOUNTER — PATIENT MESSAGE (OUTPATIENT)
Dept: OPTOMETRY | Facility: CLINIC | Age: 48
End: 2023-11-16
Payer: OTHER GOVERNMENT

## 2024-01-25 ENCOUNTER — PATIENT MESSAGE (OUTPATIENT)
Dept: FAMILY MEDICINE | Facility: CLINIC | Age: 49
End: 2024-01-25
Payer: OTHER GOVERNMENT

## 2024-01-25 DIAGNOSIS — G89.29 CHRONIC LEFT SHOULDER PAIN: Primary | ICD-10-CM

## 2024-01-25 DIAGNOSIS — M25.512 CHRONIC LEFT SHOULDER PAIN: Primary | ICD-10-CM

## 2024-01-26 NOTE — TELEPHONE ENCOUNTER
Pt is requesting a referral for the Orthopaedics Clinic at [facility] in [place], [place] for a procedure for his left shoulder. LOV: 3/3/2023. Upcoming appt on 3/8/2024.

## 2024-02-07 ENCOUNTER — OFFICE VISIT (OUTPATIENT)
Dept: ORTHOPEDICS | Facility: CLINIC | Age: 49
End: 2024-02-07
Payer: OTHER GOVERNMENT

## 2024-02-07 VITALS — BODY MASS INDEX: 27.96 KG/M2 | WEIGHT: 211 LBS | HEIGHT: 73 IN

## 2024-02-07 DIAGNOSIS — M43.10 RETROLISTHESIS: ICD-10-CM

## 2024-02-07 DIAGNOSIS — M47.816 FACET ARTHRITIS OF LUMBAR REGION: ICD-10-CM

## 2024-02-07 DIAGNOSIS — M51.36 DDD (DEGENERATIVE DISC DISEASE), LUMBAR: Primary | ICD-10-CM

## 2024-02-07 DIAGNOSIS — M54.16 LUMBAR RADICULOPATHY: ICD-10-CM

## 2024-02-07 DIAGNOSIS — S32.010A CLOSED COMPRESSION FRACTURE OF L1 VERTEBRA, INITIAL ENCOUNTER: ICD-10-CM

## 2024-02-07 PROCEDURE — 99212 OFFICE O/P EST SF 10 MIN: CPT | Mod: S$GLB,,, | Performed by: ORTHOPAEDIC SURGERY

## 2024-02-07 NOTE — PROGRESS NOTES
Subjective:       Patient ID: Hany Caba is a 48 y.o. male.    Chief Complaint: Pain of the Lumbar Spine (Lumbar pain, acute on chronic low back pain, bilateral lumbar sacral with numbness in right buttock, history of injury during  parachute operations Sept 2020)      History of Present Illness    Prior to meeting with the patient I reviewed the medical chart in [facility]. This included reviewing the previous progress notes from our office, review of the patient's last appointment with their primary care provider, review of any visits to the emergency room, and review of any pain management appointments or procedures.   Patient is here for initial evaluation chief complaint today right lumbosacral pain and left paraspinal pain of the lower lumbar region with some left lower extremity radiculitis.  These are chronic issues with intermittent exacerbations.  He is currently in the midst of an exacerbation that started over the last few days.  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  He also sustained a wrist fracture during this mission.    Current Medications  Current Outpatient Medications   Medication Sig Dispense Refill    pantoprazole (PROTONIX) 40 MG tablet Take 1 tablet (40 mg total) by mouth 2 (two) times daily. 90 tablet 1    verapamiL (VERELAN) 120 MG C24P TAKE 1 CAPSULE DAILY 90 capsule 3    diclofenac sodium (SOLARAZE) 3 % gel Apply 2 g to bilateral knees twice a day as needed for inflammation/pain. (Patient not taking: Reported on 2/7/2024) 100 g 6     No current facility-administered medications for this visit.     Facility-Administered Medications Ordered in Other Visits   Medication Dose Route Frequency Provider Last Rate Last Admin    triamcinolone acetonide injection 40 mg  40 mg Intra-articular  Dez Cohen PA-C   40 mg at 10/03/22 1245       Allergies  Review of patient's allergies indicates:  No Known  Allergies    Past Medical History  Past Medical History:   Diagnosis Date    AR (allergic rhinitis)     Bigeminy     palpitations - controlled with verapamil    KATELYNN (obstructive sleep apnea)     PPD positive, treated     Skin tag     Right upper eyelid    Unspecified hemorrhoids        Surgical History  Past Surgical History:   Procedure Laterality Date    APPENDECTOMY  1998    Dr. Pacheco    ARTHROSCOPY OF KNEE Left 01/2020    Dr. Diaz[facility] / Medial Menisectomy    CLOSED REDUCTION AND PERCUTANEOUS PINNING (CRPP) OF WRIST Right 09/14/2020    Procedure: CLOSED REDUCTION, WRIST, WITH PERCUTANEOUS PINNING;  Surgeon: Malvin Hernández Jr., MD;  Location: [facility] OR;  Service: Orthopedics;  Laterality: Right;    COLONOSCOPY N/A 09/02/2022    Procedure: COLONOSCOPY  with banding possible;  Surgeon: Fred Solis MD;  [facility];  Service: Endoscopy;  Laterality: N/A;    ESOPHAGOGASTRODUODENOSCOPY N/A 6/2/2023    Procedure: ESOPHAGOGASTRODUODENOSCOPY (EGD);  Surgeon: Ron Nieto MD;  [facility];  Service: Endoscopy;  Laterality: N/A;    FINGER TENDON REPAIR Right     Dr. Marcus, [facility] Rupture    REPAIR OF TORSION OF TESTICLE Left     Dr. Pickens, childhood    ROTATOR CUFF REPAIR Right 04/2018    Yakov Osorio / SAD with distal clavicle resection without RTC repair    ROTATOR CUFF REPAIR Left 07/2016    Yakov Osorio / RTC repair with SAD and distal clavicle resection    SEPTORHINOPLASTY  2006    Dr. Cunningham, [facility]    SEPTORHINOPLASTY  2003    Efrain Morales /  with Cautery assisted palate stiffening    ULNAR NERVE TRANSPOSITION Right 1998    Dr. Nunn, [facility]    VASECTOMY  10/2019    [facility]       Family History:   Family History   Problem Relation Age of Onset    Diabetes Mother     COPD Father         suden cariac event    Cancer Maternal Grandmother         breast    Macular degeneration Maternal Grandmother     Amblyopia Neg Hx     Blindness Neg Hx     Cataracts  Neg Hx     Glaucoma Neg Hx     Hypertension Neg Hx     Retinal detachment Neg Hx     Strabismus Neg Hx     Stroke Neg Hx     Thyroid disease Neg Hx        Social History:   Social History     Socioeconomic History    Marital status:      Spouse name: Lauryn    Number of children: 3    Years of education: 6    Highest education level: Master's degree (e.g., MA, MS, Rommel, MEd, MSW, MARIZA)   Occupational History    Occupation: Ortho Physician Assistant     Employer: [employer]     Comment: [employer]    Occupation: LTC [employer]     Employer: [employer]     Comment: [employer] Operations   Tobacco Use    Smoking status: Never    Smokeless tobacco: Never   Substance and Sexual Activity    Alcohol use: Yes     Comment: Occasional; 1-2 drinks per month    Drug use: No    Sexual activity: Yes     Partners: Female     Birth control/protection: Surgical     Social Determinants of Health     Financial Resource Strain: Medium Risk (3/3/2023)    Overall Financial Resource Strain (CARDIA)     Difficulty of Paying Living Expenses: Somewhat hard   Food Insecurity: No Food Insecurity (3/3/2023)    Hunger Vital Sign     Worried About Running Out of Food in the Last Year: Never true     Ran Out of Food in the Last Year: Never true   Transportation Needs: No Transportation Needs (3/3/2023)    PRAPARE - Transportation     Lack of Transportation (Medical): No     Lack of Transportation (Non-Medical): No   Physical Activity: Sufficiently Active (3/3/2023)    Exercise Vital Sign     Days of Exercise per Week: 5 days     Minutes of Exercise per Session: 60 min   Stress: No Stress Concern Present (3/3/2023)    [place] [employer] - Occupational Stress Questionnaire     Feeling of Stress : Only a little   Social Connections: Unknown (3/3/2023)    Social Connection and Isolation Panel [NHANES]     Frequency of Communication with Friends and Family: Once a week     Frequency of Social  Gatherings with Friends and Family: Once a week     Active Member of Clubs or Organizations: Yes     Attends Club or Organization Meetings: More than 4 times per year     Marital Status:    Housing Stability: Low Risk  (3/3/2023)    Housing Stability Vital Sign     Unable to Pay for Housing in the Last Year: No     Number of Places Lived in the Last Year: 1     Unstable Housing in the Last Year: No       Hospitalization/Major Diagnostic Procedure:     Review of Systems     General/Constitutional:  Chills denies. Fatigue denies. Fever denies. Weight gain denies. Weight loss denies.    Respiratory:  Shortness of breath denies.    Cardiovascular:  Chest pain denies.    Gastrointestinal:  Constipation denies. Diarrhea denies. Nausea denies. Vomiting denies.     Hematology:  Easy bruising denies. Prolonged bleeding denies.     Genitourinary:  Frequent urination denies. Pain in lower back denies. Painful urination denies.     Musculoskeletal:  See HPI for details    Skin:  Rash denies.    Neurologic:  Dizziness denies. Gait abnormalities denies. Seizures denies. Tingling/Numbess denies.    Psychiatric:  Anxiety denies. Depressed mood denies.     Objective:   Vital Signs: There were no vitals filed for this visit.     Physical Exam      General Examination:     Constitutional: The patient is alert and oriented to lace person and time. Mood is pleasant.     Head/Face: Normal facial features normal eyebrows    Eyes: Normal extraocular motion bilaterally    Lungs: Respirations are equal and unlabored    Gait is coordinated.    Cardiovascular: There are no swelling or varicosities present.    Lymphatic: Negative for adenopathy    Skin: Normal    Neurological: Level of consciousness normal. Oriented to place person and time and situation    Psychiatric: Oriented to time place person and situation    Lumbar exam demonstrates an antalgic gait with decreased weight-bearing left lower extremity.  Significant tenderness  palpation left lumbar paraspinal musculature and right lumbosacral region.  Lumbar extension is limited to about 50% of normal and flexion is limited to about 70% of normal.  It is noted that he has significant pain upon extension from a lumbar flexed position.  Bilateral lower extremities demonstrate full active range of motion with equal symmetric DTRs.  He demonstrates normal strength except for some give-way weakness of all muscle groups on the left secondary to pain.  Negative straight leg raise maneuver bilateral.      XRAY Report/ Interpretation :  Lumbar AP and lateral x-rays taken in the office today reviewed the patient demonstrate what appears to be a chronic or previous L1 mild anterior wedge compression deformity/fracture.  He has some L2-3 grade 1 retrolisthesis and degenerative disc disease.  He has facet sclerosis from L3 to the sacrum.  He has L4-5 and L5-S1 degenerative disc disease.  He also has some lumbar scoliosis that is more than likely posttraumatic.      Assessment:       1. DDD (degenerative disc disease), lumbar    2. Facet arthritis of lumbar region    3. Retrolisthesis    4. Lumbar radiculopathy    5. Closed compression fracture of L1 vertebra, initial encounter        Plan:       Hany was seen today for pain.    Diagnoses and all orders for this visit:    DDD (degenerative disc disease), lumbar  -     X-Ray Lumbar Spine Ap And Lateral  -     MRI Lumbar Spine Without Contrast; Future    Facet arthritis of lumbar region  -     MRI Lumbar Spine Without Contrast; Future    Retrolisthesis  -     MRI Lumbar Spine Without Contrast; Future    Lumbar radiculopathy  -     MRI Lumbar Spine Without Contrast; Future    Closed compression fracture of L1 vertebra, initial encounter  -     MRI Lumbar Spine Without Contrast; Future         No follow-ups on file.    At this point secondary to significant pain and disability that it is causing we recommend an lumbar MRI without contrast.  He has been  through physical therapy in the past with some mixed results.  He was also given a prescription for Pennsaid 2%.    Treatment options were discussed with regards to the nature of the medical condition. Conservative pain intervention and surgical options were discussed in detail. The probability of success of each separate treatment option was discussed. The patient expressed a clear understanding of the treatment options. With regards to surgery, the procedure risk, benefits, complications, and outcomes were discussed. No guarantees were given with regards to surgical outcome.   The risk of complications, morbidity, and mortality of patient management decisions have been made at the time of this visit. These are associated with the patient's problems, diagnostic procedures and treatment options. This includes the possible management options selected and those considered but not selected by the patient after shared medical decision making we discussed with the patient.   This note was created using Dragon voice recognition software that occasionally misinterpreted phrases or words.

## 2024-02-12 ENCOUNTER — PATIENT MESSAGE (OUTPATIENT)
Dept: FAMILY MEDICINE | Facility: CLINIC | Age: 49
End: 2024-02-12
Payer: OTHER GOVERNMENT

## 2024-02-12 ENCOUNTER — TELEPHONE (OUTPATIENT)
Dept: FAMILY MEDICINE | Facility: CLINIC | Age: 49
End: 2024-02-12
Payer: OTHER GOVERNMENT

## 2024-02-16 ENCOUNTER — HOSPITAL ENCOUNTER (OUTPATIENT)
Dept: RADIOLOGY | Facility: HOSPITAL | Age: 49
Discharge: HOME | End: 2024-02-16
Attending: ORTHOPAEDIC SURGERY
Payer: OTHER GOVERNMENT

## 2024-02-16 DIAGNOSIS — S32.010A CLOSED COMPRESSION FRACTURE OF L1 VERTEBRA, INITIAL ENCOUNTER: ICD-10-CM

## 2024-02-16 DIAGNOSIS — M47.816 FACET ARTHRITIS OF LUMBAR REGION: ICD-10-CM

## 2024-02-16 DIAGNOSIS — M43.10 RETROLISTHESIS: ICD-10-CM

## 2024-02-16 DIAGNOSIS — M54.16 LUMBAR RADICULOPATHY: ICD-10-CM

## 2024-02-16 DIAGNOSIS — M51.36 DDD (DEGENERATIVE DISC DISEASE), LUMBAR: ICD-10-CM

## 2024-02-16 PROCEDURE — 72148 MRI LUMBAR SPINE W/O DYE: CPT | Mod: TC

## 2024-02-19 ENCOUNTER — OFFICE VISIT (OUTPATIENT)
Dept: ORTHOPEDICS | Facility: CLINIC | Age: 49
End: 2024-02-19
Payer: OTHER GOVERNMENT

## 2024-02-19 VITALS — BODY MASS INDEX: 27.96 KG/M2 | HEIGHT: 73 IN | WEIGHT: 211 LBS

## 2024-02-19 DIAGNOSIS — M54.16 LUMBAR RADICULOPATHY: ICD-10-CM

## 2024-02-19 DIAGNOSIS — M47.816 FACET ARTHRITIS OF LUMBAR REGION: ICD-10-CM

## 2024-02-19 DIAGNOSIS — M51.36 DDD (DEGENERATIVE DISC DISEASE), LUMBAR: Primary | ICD-10-CM

## 2024-02-19 DIAGNOSIS — S32.010A CLOSED COMPRESSION FRACTURE OF L1 VERTEBRA, INITIAL ENCOUNTER: ICD-10-CM

## 2024-02-19 DIAGNOSIS — M43.10 RETROLISTHESIS: ICD-10-CM

## 2024-02-19 PROCEDURE — 99213 OFFICE O/P EST LOW 20 MIN: CPT | Mod: S$GLB,,, | Performed by: ORTHOPAEDIC SURGERY

## 2024-02-19 NOTE — PROGRESS NOTES
Subjective:       Patient ID: Hany Caba is a 48 y.o. male.    Chief Complaint: Pain of the Spine (F/u to discuss lumbar MRi)      History of Present Illness    Prior to meeting with the patient I reviewed the medical chart in [facility]. This included reviewing the previous progress notes from our office, review of the patient's last appointment with their primary care provider, review of any visits to the emergency room, and review of any pain management appointments or procedures.   Patient is here follow-up for right lumbosacral pain and left paraspinal pain of the lower lumbar region with some left lower extremity radiculitis. These are chronic issues with intermittent exacerbations.  He has an updated lumbar MRI to review today.  The exacerbation that he was having when he was here originally is somewhat improved.    Current Medications  Current Outpatient Medications   Medication Sig Dispense Refill    diclofenac sodium 2 % SoPk Apply 40 mg topically 2 (two) times daily. 112 g 5    pantoprazole (PROTONIX) 40 MG tablet Take 1 tablet (40 mg total) by mouth 2 (two) times daily. 90 tablet 1    verapamiL (VERELAN) 120 MG C24P TAKE 1 CAPSULE DAILY 90 capsule 3     No current facility-administered medications for this visit.     Facility-Administered Medications Ordered in Other Visits   Medication Dose Route Frequency Provider Last Rate Last Admin    triamcinolone acetonide injection 40 mg  40 mg Intra-articular  Dez Cohen PA-C   40 mg at 10/03/22 1245       Allergies  Review of patient's allergies indicates:  No Known Allergies    Past Medical History  Past Medical History:   Diagnosis Date    AR (allergic rhinitis)     Bigeminy     palpitations - controlled with verapamil    KATELYNN (obstructive sleep apnea)     PPD positive, treated     Skin tag     Right upper eyelid    Unspecified hemorrhoids        Surgical History  Past Surgical History:   Procedure Laterality Date    APPENDECTOMY  1998      Junior    ARTHROSCOPY OF KNEE Left 01/2020    Dr. [facility] Med[facility]    CLOSED REDUCTION AND PERCUTANEOUS PINNING (CRPP) OF WRIST Right 09/14/2020    Procedure: CLOSED REDUCTION, WRIST, WITH PERCUTANEOUS PINNING;  Surgeon: Malvin Hernández Jr., MD;  Location: [facility];  Service: Orthopedics;  Laterality: Right;    COLONOSCOPY N/A 09/02/2022    Procedure: COLONOSCOPY  with banding possible;  Surgeon: Fred Solis MD;  Location: [facility];  Service: Endoscopy;  Laterality: N/A;    ESOPHAGOGASTRODUODENOSCOPY N/A 6/2/2023    Procedure: ESOPHAGOGASTRODUODENOSCOPY (EGD);  Surgeon: Ron Nieto MD;  Location: [facility];  Service: Endoscopy;  Laterality: N/A;    FINGER TENDON REPAIR Right     Dr. Marcus, [facility] Rupture    REPAIR OF TORSION OF TESTICLE Left     Dr. Pickens, childhood    ROTATOR CUFF REPAIR Right 04/2018    Yakov Osorio / SAD with distal clavicle resection without RTC repair    ROTATOR CUFF REPAIR Left 07/2016    Yakov Osorio / RTC repair with SAD and distal clavicle resection    SEPTORHINOPLASTY  2006    Dr. Cunningham, [facility]    SEPTORHINOPLASTY  2003    Efrain Morales /  with Cautery assisted palate stiffening    ULNAR [facility] Right [facility]    VASECTOMY  10/2019    [facility]       Family History:   Family History   Problem Relation Age of Onset    Diabetes Mother     COPD Father         suden cariac event    Cancer Maternal Grandmother         breast    Macular degeneration Maternal Grandmother     Amblyopia Neg Hx     Blindness Neg Hx     Cataracts Neg Hx     Glaucoma Neg Hx     Hypertension Neg Hx     Retinal detachment Neg Hx     Strabismus Neg Hx     Stroke Neg Hx     Thyroid disease Neg Hx        Social History:   Social History     Socioeconomic History    Marital status:      Spouse name: Lauryn    Number of children: 3    Years of education: 6    Highest education level: Master's degree (e.g., MS JODY, Rommel,  Bill, MSW, MARIZA)   Occupational History    Occupation: Ortho Physician Assistant     Employer: [employer]     Comment: [employer]    Occupation: LTC [employer]     Employer: [employer]     Comment: [employer]   Tobacco Use    Smoking status: Never    Smokeless tobacco: Never   Substance and Sexual Activity    Alcohol use: Yes     Comment: Occasional; 1-2 drinks per month    Drug use: No    Sexual activity: Yes     Partners: Female     Birth control/protection: Surgical     Social Determinants of Health     Financial Resource Strain: Medium Risk (3/3/2023)    Overall Financial Resource Strain (CARDIA)     Difficulty of Paying Living Expenses: Somewhat hard   Food Insecurity: No Food Insecurity (3/3/2023)    Hunger Vital Sign     Worried About Running Out of Food in the Last Year: Never true     Ran Out of Food in the Last Year: Never true   Transportation Needs: No Transportation Needs (3/3/2023)    PRAPARE - Transportation     Lack of Transportation (Medical): No     Lack of Transportation (Non-Medical): No   Physical Activity: Sufficiently Active (3/3/2023)    Exercise Vital Sign     Days of Exercise per Week: 5 days     Minutes of Exercise per Session: 60 min   Stress: No Stress Concern Present (3/3/2023)    [employer] [place] [employer] - Occupational Stress Questionnaire     Feeling of Stress : Only a little   Social Connections: Unknown (3/3/2023)    Social Connection and Isolation Panel [NHANES]     Frequency of Communication with Friends and Family: Once a week     Frequency of Social Gatherings with Friends and Family: Once a week     Active Member of Clubs or Organizations: Yes     Attends Club or Organization Meetings: More than 4 times per year     Marital Status:    Housing Stability: Low Risk  (3/3/2023)    Housing Stability Vital Sign     Unable to Pay for Housing in the Last Year: No     Number of Places Lived in the Last Year: 1     Unstable Housing  in the Last Year: No       Hospitalization/Major Diagnostic Procedure:     Review of Systems     General/Constitutional:  Chills denies. Fatigue denies. Fever denies. Weight gain denies. Weight loss denies.    Respiratory:  Shortness of breath denies.    Cardiovascular:  Chest pain denies.    Gastrointestinal:  Constipation denies. Diarrhea denies. Nausea denies. Vomiting denies.     Hematology:  Easy bruising denies. Prolonged bleeding denies.     Genitourinary:  Frequent urination denies. Pain in lower back denies. Painful urination denies.     Musculoskeletal:  See HPI for details    Skin:  Rash denies.    Neurologic:  Dizziness denies. Gait abnormalities denies. Seizures denies. Tingling/Numbess denies.    Psychiatric:  Anxiety denies. Depressed mood denies.     Objective:   Vital Signs: There were no vitals filed for this visit.     Physical Exam      General Examination:     Constitutional: The patient is alert and oriented to lace person and time. Mood is pleasant.     Head/Face: Normal facial features normal eyebrows    Eyes: Normal extraocular motion bilaterally    Lungs: Respirations are equal and unlabored    Gait is coordinated.    Cardiovascular: There are no swelling or varicosities present.    Lymphatic: Negative for adenopathy    Skin: Normal    Neurological: Level of consciousness normal. Oriented to place person and time and situation    Psychiatric: Oriented to time place person and situation    Lumbar exam demonstrates an improved gait with no noticeable discrepancies with his weight-bearing of either lower extremity.  Moderate tenderness palpation left lumbar paraspinal musculature and right lumbosacral region with palpable muscle spasm and trigger points noted. Lumbar extension is remains limited to about 50% of normal and flexion is limited to about 70% of normal. It is noted that he has significant pain upon extension from a lumbar flexed position. Bilateral lower extremities demonstrate full  active range of motion with equal symmetric DTRs at 2+. He demonstrates normal strength. Negative straight leg raise maneuver bilateral.     XRAY Report/ Interpretation:  Lumbar MRI reviewed with the patient the office today demonstrates moderate disc desiccation of L2-3 and L4-5.  Mild chronic compression deformity of the L1 vertebral body.  L2-3 broad-based disc bulge combined with facet hypertrophy causing moderate foraminal stenosis; somewhat worse on the left than on the right.  Mild to moderate based disc bulge when combined with facet hypertrophy causing mild foraminal stenosis right greater than left.      Assessment:       1. DDD (degenerative disc disease), lumbar    2. Facet arthritis of lumbar region    3. Retrolisthesis    4. Lumbar radiculopathy    5. Closed compression fracture of L1 vertebra, initial encounter        Plan:       Hany was seen today for pain.    Diagnoses and all orders for this visit:    DDD (degenerative disc disease), lumbar    Facet arthritis of lumbar region    Retrolisthesis    Lumbar radiculopathy    Closed compression fracture of L1 vertebra, initial encounter         No follow-ups on file.  This is to attest that the physician's assistant, Berlin Marroquin served in the capacity as a scribe for this patient's encounter.  This is also verify that I have reviewed the patient's history and help formulate the treatment plan as discussed with the physician's assistant.  I have actively participated in the evaluation and treatment plan for this patient visit.  The treatment plan and medical decision-making is outlined below  Treatment options at this time would include physical therapy or referral to a pain management physician for epidural steroid injections and/or lumbar medial branch blocks with progression towards rhizotomy.  For the most part, the patient chooses to live with the majority of his pain and symptoms.  He will take NSAIDs and use activity modification or  avoidance to help him manage.  He will decide whether not he wants a referral to physical therapy or pain management or maybe even both.    The patient asked us to opine on whether not his  service in the [employer] of [employer] has had some contribution to his back pain and his lumbar radiculopathy and the degenerative changes noted on his diagnostic studies.  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  Despite the multitude of orthopedic injuries that he has incurred and chronic issues that he deals with, he chooses to continue to serve because of an internal duty.    Treatment options were discussed with regards to the nature of the medical condition. Conservative pain intervention and surgical options were discussed in detail. The probability of success of each separate treatment option was discussed. The patient expressed a clear understanding of the treatment options. With regards to surgery, the procedure risk, benefits, complications, and outcomes were discussed. No guarantees were given with regards to surgical outcome.   The risk of complications, morbidity, and mortality of patient management decisions have been made at the time of this visit. These are associated with the patient's problems, diagnostic procedures and treatment options. This includes the possible management options selected and those considered but not selected by the patient after shared medical decision making we discussed with the patient.     This note was created using Dragon voice recognition software that occasionally misinterpreted phrases  or words.

## 2024-03-08 ENCOUNTER — OFFICE VISIT (OUTPATIENT)
Dept: FAMILY MEDICINE | Facility: CLINIC | Age: 49
End: 2024-03-08
Attending: INTERNAL MEDICINE
Payer: OTHER GOVERNMENT

## 2024-03-08 ENCOUNTER — PATIENT MESSAGE (OUTPATIENT)
Dept: FAMILY MEDICINE | Facility: CLINIC | Age: 49
End: 2024-03-08

## 2024-03-08 VITALS
RESPIRATION RATE: 18 BRPM | OXYGEN SATURATION: 96 % | DIASTOLIC BLOOD PRESSURE: 74 MMHG | SYSTOLIC BLOOD PRESSURE: 118 MMHG | HEIGHT: 73 IN | WEIGHT: 216.25 LBS | BODY MASS INDEX: 28.66 KG/M2 | HEART RATE: 73 BPM

## 2024-03-08 DIAGNOSIS — Z00.00 PREVENTATIVE HEALTH CARE: Primary | ICD-10-CM

## 2024-03-08 PROCEDURE — 99213 OFFICE O/P EST LOW 20 MIN: CPT | Mod: PBBFAC,PO | Performed by: FAMILY MEDICINE

## 2024-03-08 PROCEDURE — 99396 PREV VISIT EST AGE 40-64: CPT | Mod: S$PBB,,, | Performed by: FAMILY MEDICINE

## 2024-03-08 PROCEDURE — 99999 PR PBB SHADOW E&M-EST. PATIENT-LVL III: CPT | Mod: PBBFAC,,, | Performed by: FAMILY MEDICINE

## 2024-03-08 RX ORDER — SILDENAFIL 100 MG/1
100 TABLET, FILM COATED ORAL DAILY PRN
Qty: 8 TABLET | Refills: 6 | Status: SHIPPED | OUTPATIENT
Start: 2024-03-08 | End: 2024-04-10 | Stop reason: SDUPTHER

## 2024-03-08 NOTE — PROGRESS NOTES
Subjective:       Patient ID: Hany Caba is a 48 y.o. male.    Chief Complaint: Preventative Health Care (Physical)    Here for annual exam    Bigemy - stable taking Verapamil daily  GERD - taking protonix daily    Exercising most days with running.            Past Medical History:   Diagnosis Date    AR (allergic rhinitis)     Bigeminy     palpitations - controlled with verapamil    GERD (gastroesophageal reflux disease)     KATELYNN (obstructive sleep apnea)     PPD positive, treated     Skin tag     Right upper eyelid    Unspecified hemorrhoids        Past Surgical History:   Procedure Laterality Date    APPENDECTOMY  1998    Dr. Pacheco    ARTHROSCOPY OF KNEE Left 01/2020    [facility]. [facility] Medial [facility]is[facility]    CLOSED REDUCTION AND PERCUTANEOUS PINNING (CRPP) OF WRIST Right 09/14/2020    Procedure: CLOSED REDUCTION, WRIST, WITH PERCUTANEOUS PINNING;  Surgeon: Malvin Hernández Jr., MD;  Location: [facility];  Service: Orthopedics;  Laterality: Right;    COLONOSCOPY N/A 09/02/2022    Procedure: COLONOSCOPY  with banding possible;  Surgeon: Fred Solis MD;  Location: [facility];  Service: Endoscopy;  Laterality: N/A;    ESOPHAGOGASTRODUODENOSCOPY N/A 6/2/2023    Procedure: ESOPHAGOGASTRODUODENOSCOPY (EGD);  Surgeon: Ron Nieto MD;  Location: [facility];  Service: Endoscopy;  Laterality: N/A;    FINGER TENDON REPAIR Right     Dr. Marcus, [facility] Rupture    REPAIR OF TORSION OF TESTICLE Left     Dr. Pickens, childhood    ROTATOR CUFF REPAIR Right 04/2018    Yakov Osorio / SAD with distal clavicle resection without RTC repair    ROTATOR CUFF REPAIR Left 07/2016    Yakov Osorio / RTC repair with SAD and distal clavicle resection    SEPTORHINOPLASTY  2006    Dr. Cunningham, [facility]    SEPTORHINOPLASTY  2003    Efrain Morales /  with Cautery assisted palate stiffening    UL[facility]AR [facility] Right [facility]    [facility]Y  10/2019    [facility]       Review of  patient's allergies indicates:  No Known Allergies    Social History     Socioeconomic History    Marital status:      Spouse name: Lauryn    Number of children: 3    Years of education: 6    Highest education level: Master's degree (e.g., MA, MS, Rommel, MEd, MSW, MARIZA)   Occupational History    Occupation: Ortho Physician Assistant     Employer: [employer]     Comment: [employer]    Occupation: LTC [employer]     Employer: [employer]     Comment: [employer]   Tobacco Use    Smoking status: Never    Smokeless tobacco: Never   Substance and Sexual Activity    Alcohol use: Yes     Comment: Occasional; 1-2 drinks per month    Drug use: No    Sexual activity: Yes     Partners: Female     Birth control/protection: Surgical     Social Determinants of Health     Financial Resource Strain: Low Risk  (3/8/2024)    Overall Financial Resource Strain (CARDIA)     Difficulty of Paying Living Expenses: Not very hard   Food Insecurity: No Food Insecurity (3/8/2024)    Hunger Vital Sign     Worried About Running Out of Food in the Last Year: Never true     Ran Out of Food in the Last Year: Never true   Transportation Needs: No Transportation Needs (3/8/2024)    PRAPARE - Transportation     Lack of Transportation (Medical): No     Lack of Transportation (Non-Medical): No   Physical Activity: Sufficiently Active (3/8/2024)    Exercise Vital Sign     Days of Exercise per Week: 5 days     Minutes of Exercise per Session: 60 min   Stress: No Stress Concern Present (3/8/2024)    [place] [employer] - Occupational Stress Questionnaire     Feeling of Stress : Only a little   Social Connections: Unknown (3/8/2024)    Social Connection and Isolation Panel [NHANES]     Frequency of Communication with Friends and Family: Twice a week     Frequency of Social Gatherings with Friends and Family: Once a week     Active Member of Clubs or Organizations: Yes     Attends Club or  Organization Meetings: More than 4 times per year     Marital Status:    Housing Stability: Low Risk  (3/8/2024)    Housing Stability Vital Sign     Unable to Pay for Housing in the Last Year: No     Number of Places Lived in the Last Year: 1     Unstable Housing in the Last Year: No       Current Outpatient Medications on File Prior to Visit   Medication Sig Dispense Refill    diclofenac sodium 2 % SoPk Apply 40 mg topically 2 (two) times daily. 112 g 5    pantoprazole (PROTONIX) 40 MG tablet Take 1 tablet (40 mg total) by mouth 2 (two) times daily. 90 tablet 1    verapamiL (VERELAN) 120 MG C24P TAKE 1 CAPSULE DAILY 90 capsule 3     Current Facility-Administered Medications on File Prior to Visit   Medication Dose Route Frequency Provider Last Rate Last Admin    triamcinolone acetonide injection 40 mg  40 mg Intra-articular  Dez Cohen PA-C   40 mg at 10/03/22 1245       Family History   Problem Relation Age of Onset    Diabetes Mother     COPD Father         suden cariac event    Cancer Maternal Grandmother         breast    Macular degeneration Maternal Grandmother     Amblyopia Neg Hx     Blindness Neg Hx     Cataracts Neg Hx     Glaucoma Neg Hx     Hypertension Neg Hx     Retinal detachment Neg Hx     Strabismus Neg Hx     Stroke Neg Hx     Thyroid disease Neg Hx        Review of Systems   Constitutional:  Negative for appetite change, chills, fever and unexpected weight change.   HENT:  Negative for sore throat and trouble swallowing.    Eyes:  Negative for pain and visual disturbance.   Respiratory:  Negative for cough, chest tightness, shortness of breath and wheezing.    Cardiovascular:  Negative for chest pain, palpitations and leg swelling.   Gastrointestinal:  Negative for abdominal pain, blood in stool, constipation, diarrhea and nausea.   Genitourinary:  Negative for difficulty urinating, dysuria and hematuria.   Musculoskeletal:  Negative for arthralgias, gait problem and neck pain.  "  Skin:  Negative for rash and wound.   Neurological:  Negative for dizziness, weakness, light-headedness and headaches.   Hematological:  Negative for adenopathy.   Psychiatric/Behavioral:  Negative for dysphoric mood.        Objective:      /74   Pulse 73   Resp 18   Ht 6' 1" (1.854 m)   Wt 98.1 kg (216 lb 4.3 oz)   SpO2 96%   BMI 28.53 kg/m²   Physical Exam  Constitutional:       General: He is not in acute distress.     Appearance: He is well-developed.   HENT:      Head: Normocephalic and atraumatic.      Right Ear: External ear normal.      Left Ear: External ear normal.      Mouth/Throat:      Pharynx: Uvula midline. No oropharyngeal exudate.   Eyes:      General: Lids are normal.      Conjunctiva/sclera: Conjunctivae normal.      Pupils: Pupils are equal, round, and reactive to light.   Neck:      Thyroid: No thyroid mass or thyromegaly.      Trachea: Phonation normal.   Cardiovascular:      Rate and Rhythm: Normal rate and regular rhythm.      Heart sounds: Normal heart sounds. No murmur heard.     No friction rub. No gallop.   Pulmonary:      Effort: Pulmonary effort is normal. No respiratory distress.      Breath sounds: Normal breath sounds. No wheezing or rales.   Abdominal:      General: Abdomen is flat. Bowel sounds are normal.      Palpations: Abdomen is soft.   Musculoskeletal:         General: Normal range of motion.      Cervical back: Full passive range of motion without pain, normal range of motion and neck supple.   Lymphadenopathy:      Cervical: No cervical adenopathy.   Skin:     General: Skin is warm and dry.   Neurological:      Mental Status: He is alert and oriented to person, place, and time.      Cranial Nerves: No cranial nerve deficit.      Coordination: Coordination normal.   Psychiatric:         Speech: Speech normal.         Behavior: Behavior normal.         Thought Content: Thought content normal.         Judgment: Judgment normal.         Assessment:       1. " Preventative health care          Plan:       Preventative health care  -     CBC Auto Differential; Future; Expected date: 03/08/2024  -     Comprehensive Metabolic Panel; Future; Expected date: 03/08/2024  -     Lipid Panel; Future; Expected date: 03/08/2024  -     PSA, Screening; Future; Expected date: 03/08/2024  -     Testosterone; Future; Expected date: 03/08/2024    Other orders  -     sildenafiL (VIAGRA) 100 MG tablet; Take 1 tablet (100 mg total) by mouth daily as needed for Erectile Dysfunction.  Dispense: 8 tablet; Refill: 6        Overall healthy  Labs soon  Counseled on regular exercise, maintenance of a healthy weight, balanced diet rich in fruits/vegetables and lean protein, and avoidance of unhealthy habits like smoking and excessive alcohol intake.

## 2024-03-09 ENCOUNTER — TELEPHONE (OUTPATIENT)
Dept: FAMILY MEDICINE | Facility: CLINIC | Age: 49
End: 2024-03-09

## 2024-03-13 ENCOUNTER — LAB VISIT (OUTPATIENT)
Dept: LAB | Facility: HOSPITAL | Age: 49
End: 2024-03-13
Attending: FAMILY MEDICINE
Payer: OTHER GOVERNMENT

## 2024-03-13 DIAGNOSIS — Z00.00 PREVENTATIVE HEALTH CARE: ICD-10-CM

## 2024-03-13 LAB
ALBUMIN SERPL BCP-MCNC: 3.8 G/DL (ref 3.5–5.2)
ALP SERPL-CCNC: 78 U/L (ref 55–135)
ALT SERPL W/O P-5'-P-CCNC: 25 U/L (ref 10–44)
ANION GAP SERPL CALC-SCNC: 8 MMOL/L (ref 8–16)
AST SERPL-CCNC: 25 U/L (ref 10–40)
BASOPHILS # BLD AUTO: 0.04 K/UL (ref 0–0.2)
BASOPHILS NFR BLD: 0.5 % (ref 0–1.9)
BILIRUB SERPL-MCNC: 0.4 MG/DL (ref 0.1–1)
BUN SERPL-MCNC: 14 MG/DL (ref 6–20)
CALCIUM SERPL-MCNC: 9.2 MG/DL (ref 8.7–10.5)
CHLORIDE SERPL-SCNC: 109 MMOL/L (ref 95–110)
CHOLEST SERPL-MCNC: 179 MG/DL (ref 120–199)
CHOLEST/HDLC SERPL: 4.5 {RATIO} (ref 2–5)
CO2 SERPL-SCNC: 25 MMOL/L (ref 23–29)
COMPLEXED PSA SERPL-MCNC: 0.74 NG/ML (ref 0–4)
CREAT SERPL-MCNC: 1.1 MG/DL (ref 0.5–1.4)
DIFFERENTIAL METHOD BLD: NORMAL
EOSINOPHIL # BLD AUTO: 0.3 K/UL (ref 0–0.5)
EOSINOPHIL NFR BLD: 4.2 % (ref 0–8)
ERYTHROCYTE [DISTWIDTH] IN BLOOD BY AUTOMATED COUNT: 13.2 % (ref 11.5–14.5)
EST. GFR  (NO RACE VARIABLE): >60 ML/MIN/1.73 M^2
GLUCOSE SERPL-MCNC: 97 MG/DL (ref 70–110)
HCT VFR BLD AUTO: 45.5 % (ref 40–54)
HDLC SERPL-MCNC: 40 MG/DL (ref 40–75)
HDLC SERPL: 22.3 % (ref 20–50)
HGB BLD-MCNC: 15.3 G/DL (ref 14–18)
IMM GRANULOCYTES # BLD AUTO: 0.02 K/UL (ref 0–0.04)
IMM GRANULOCYTES NFR BLD AUTO: 0.3 % (ref 0–0.5)
LDLC SERPL CALC-MCNC: 107 MG/DL (ref 63–159)
LYMPHOCYTES # BLD AUTO: 2.3 K/UL (ref 1–4.8)
LYMPHOCYTES NFR BLD: 29.4 % (ref 18–48)
MCH RBC QN AUTO: 29.5 PG (ref 27–31)
MCHC RBC AUTO-ENTMCNC: 33.6 G/DL (ref 32–36)
MCV RBC AUTO: 88 FL (ref 82–98)
MONOCYTES # BLD AUTO: 0.7 K/UL (ref 0.3–1)
MONOCYTES NFR BLD: 9.2 % (ref 4–15)
NEUTROPHILS # BLD AUTO: 4.3 K/UL (ref 1.8–7.7)
NEUTROPHILS NFR BLD: 56.4 % (ref 38–73)
NONHDLC SERPL-MCNC: 139 MG/DL
NRBC BLD-RTO: 0 /100 WBC
PLATELET # BLD AUTO: 246 K/UL (ref 150–450)
PMV BLD AUTO: 10.2 FL (ref 9.2–12.9)
POTASSIUM SERPL-SCNC: 3.9 MMOL/L (ref 3.5–5.1)
PROT SERPL-MCNC: 6.8 G/DL (ref 6–8.4)
RBC # BLD AUTO: 5.19 M/UL (ref 4.6–6.2)
SODIUM SERPL-SCNC: 142 MMOL/L (ref 136–145)
TESTOST SERPL-MCNC: 481 NG/DL (ref 304–1227)
TRIGL SERPL-MCNC: 160 MG/DL (ref 30–150)
WBC # BLD AUTO: 7.69 K/UL (ref 3.9–12.7)

## 2024-03-13 PROCEDURE — 80053 COMPREHEN METABOLIC PANEL: CPT | Performed by: FAMILY MEDICINE

## 2024-03-13 PROCEDURE — 85025 COMPLETE CBC W/AUTO DIFF WBC: CPT | Performed by: FAMILY MEDICINE

## 2024-03-13 PROCEDURE — 80061 LIPID PANEL: CPT | Performed by: FAMILY MEDICINE

## 2024-03-13 PROCEDURE — 36415 COLL VENOUS BLD VENIPUNCTURE: CPT | Mod: PO | Performed by: FAMILY MEDICINE

## 2024-03-13 PROCEDURE — 84403 ASSAY OF TOTAL TESTOSTERONE: CPT | Performed by: FAMILY MEDICINE

## 2024-03-13 PROCEDURE — 84153 ASSAY OF PSA TOTAL: CPT | Performed by: FAMILY MEDICINE

## 2024-04-10 DIAGNOSIS — N52.9 ERECTILE DYSFUNCTION, UNSPECIFIED ERECTILE DYSFUNCTION TYPE: Primary | ICD-10-CM

## 2024-04-10 DIAGNOSIS — K21.9 GASTROESOPHAGEAL REFLUX DISEASE, UNSPECIFIED WHETHER ESOPHAGITIS PRESENT: ICD-10-CM

## 2024-04-10 RX ORDER — SILDENAFIL 100 MG/1
100 TABLET, FILM COATED ORAL DAILY PRN
Qty: 90 TABLET | Refills: 0 | Status: SHIPPED | OUTPATIENT
Start: 2024-04-10 | End: 2024-07-09

## 2024-04-11 RX ORDER — PANTOPRAZOLE SODIUM 40 MG/1
TABLET, DELAYED RELEASE ORAL
Qty: 60 TABLET | Refills: 1 | Status: SHIPPED | OUTPATIENT
Start: 2024-04-11

## 2024-04-11 NOTE — TELEPHONE ENCOUNTER
No care due was identified.  [facility] Embedded Care Due Messages. Reference number: [id number].   4/10/2024 7:13:50 PM CDT

## 2024-05-15 ENCOUNTER — OFFICE VISIT (OUTPATIENT)
Dept: ORTHOPEDICS | Facility: CLINIC | Age: 49
End: 2024-05-15
Payer: OTHER GOVERNMENT

## 2024-05-15 VITALS — WEIGHT: 216.94 LBS | BODY MASS INDEX: 28.75 KG/M2 | HEIGHT: 73 IN

## 2024-05-15 DIAGNOSIS — M17.11 PRIMARY OSTEOARTHRITIS OF RIGHT KNEE: Primary | ICD-10-CM

## 2024-05-15 DIAGNOSIS — M17.12 PRIMARY OSTEOARTHRITIS OF LEFT KNEE: ICD-10-CM

## 2024-05-15 PROCEDURE — 20610 DRAIN/INJ JOINT/BURSA W/O US: CPT | Mod: 50,S$GLB,, | Performed by: ORTHOPAEDIC SURGERY

## 2024-05-15 PROCEDURE — 99499 UNLISTED E&M SERVICE: CPT | Mod: S$GLB,,, | Performed by: ORTHOPAEDIC SURGERY

## 2024-05-15 NOTE — PROCEDURES
Large Joint Aspiration/Injection: R knee    Date/Time: 5/15/2024 10:45 AM    Performed by: Malvin Nance MD  Authorized by: Malvin Nance MD    Consent Done?:  Yes (Verbal)  Indications:  Arthritis and pain  Site marked: the procedure site was marked    Timeout: prior to procedure the correct patient, procedure, and site was verified    Prep: patient was prepped and draped in usual sterile fashion      Local anesthesia used?: Yes    Local anesthetic:  Lidocaine 1% without epinephrine    Details:  Needle Size:  21 G  Ultrasonic Guidance for needle placement?: No    Location:  Knee  Site:  R knee  Medications:  48 mg hylan g-f 20 48 mg/6 mL  Patient tolerance:  Patient tolerated the procedure well with no immediate complications  Large Joint Aspiration/Injection: L knee    Date/Time: 5/15/2024 10:45 AM    Performed by: Malvin Nance MD  Authorized by: Malvin Nance MD    Consent Done?:  Yes (Verbal)  Indications:  Arthritis and pain  Site marked: the procedure site was marked    Timeout: prior to procedure the correct patient, procedure, and site was verified    Prep: patient was prepped and draped in usual sterile fashion      Local anesthesia used?: Yes    Local anesthetic:  Lidocaine 1% without epinephrine    Details:  Needle Size:  21 G  Ultrasonic Guidance for needle placement?: No    Location:  Knee  Site:  L knee  Medications:  48 mg hylan g-f 20 48 mg/6 mL  Patient tolerance:  Patient tolerated the procedure well with no immediate complications

## 2024-05-15 NOTE — PROGRESS NOTES
Subjective:       Patient ID: Hany Caba is a 48 y.o. male.    Chief Complaint: Pain of the Left Knee (Synvisc One injections today) and Pain of the Right Knee      History of Present Illness    Prior to meeting with the patient I reviewed the medical chart in [facility]. This included reviewing the previous progress notes from our office, review of the patient's last appointment with their primary care provider, review of any visits to the emergency room, and review of any pain management appointments or procedures.   Hany comes in today for follow-up his bilateral knees.  He has known degenerative changes in his knees proven on previous arthroscopy and advanced imaging/MRI.  He has treated this in the past with various over-the-counter NSAIDs and intra-articular injections of corticosteroids and viscosupplementation.  His last viscosupplementation injections were just over 6 months ago.  Comes in today for repeat injections.  He denies any new injury or trauma.  Does have some level of achy pain on an almost daily basis.  It is worse with any weight-bearing activities.  It occasionally interferes with some of his ADLs and  duties, yet he does push through.  He usually suffers for several days to up to a week or more after physical activity.    Current Medications  Current Outpatient Medications   Medication Sig Dispense Refill    pantoprazole (PROTONIX) 40 MG tablet TAKE 1 TABLET TWICE A DAY AS DIRECTED 60 tablet 1    sildenafiL (VIAGRA) 100 MG tablet Take 1 tablet (100 mg total) by mouth daily as needed for Erectile Dysfunction. 90 tablet 0    verapamiL (VERELAN) 120 MG C24P Take 1 capsule (120 mg total) by mouth once daily. 90 capsule 3     No current facility-administered medications for this visit.       Allergies  Review of patient's allergies indicates:  No Known Allergies    Past Medical History  Past Medical History:   Diagnosis Date    AR (allergic rhinitis)     Bigeminy     palpitations -  controlled with verapamil    GERD (gastroesophageal reflux disease)     KATELYNN (obstructive sleep apnea)     PPD positive, treated     Skin tag     Right upper eyelid    Unspecified hemorrhoids        Surgical History  Past Surgical History:   Procedure Laterality Date    APPENDECTOMY  1998    Dr. Pacheco    ARTHROSCOPY OF KNEE Left 01/2020    [facility]    CLOSED REDUCTION AND PERCUTANEOUS PINNING (CRPP) OF WRIST Right 09/14/2020    Procedure: CLOSED REDUCTION, WRIST, WITH PERCUTANEOUS PINNING;  Surgeon: Malvin Hernández Jr., MD;  Location: [facility] OR;  Service: Orthopedics;  Laterality: Right;    COLONOSCOPY N/A 09/02/2022    Procedure: COLONOSCOPY  with banding possible;  Surgeon: Fred Solis MD;  Location: [facility] ENDO;  Service: Endoscopy;  Laterality: N/A;    ESOPHAGOGASTRODUODENOSCOPY N/A 6/2/2023    Procedure: ESOPHAGOGASTRODUODENOSCOPY (EGD);  Surgeon: Ron Nieto MD;  Location: [facility];  Service: Endoscopy;  Laterality: N/A;    FINGER TENDON REPAIR Right     Dr. Marcus, [facility] Rupture    REPAIR OF TORSION OF TESTICLE Left     Dr. Pickens, childhood    ROTATOR CUFF REPAIR Right 04/2018    Yakov Osorio / SAD with distal clavicle resection without RTC repair    ROTATOR CUFF REPAIR Left 07/2016    Yakov Osorio / RTC repair with SAD and distal clavicle resection    SEPTORHINOPLASTY  2006    Dr. Cunningham, [facility]    SEPTORHINOPLASTY  2003    Dr. Snow, [facility] /  with Cautery assisted palate stiffening    ULNAR NERVE TRANSPOSITION Right 1998    Dr. Nunn, [facility]    VASECTOMY  10/2019    [facility]       Family History:   Family History   Problem Relation Name Age of Onset    Diabetes Mother      COPD Father          suden cariac event    Cancer Maternal Grandmother          breast    Macular degeneration Maternal Grandmother      Amblyopia Neg Hx      Blindness Neg Hx      Cataracts Neg Hx      Glaucoma Neg Hx      Hypertension Neg Hx      Retinal detachment  Neg Hx      Strabismus Neg Hx      Stroke Neg Hx      Thyroid disease Neg Hx         Social History:   Social History     Socioeconomic History    Marital status:      Spouse name: Lauryn    Number of children: 3    Years of education: 6    Highest education level: Master's degree (e.g., MA, MS, Rommel, MEd, MSW, MARIZA)   Occupational History    Occupation: Ortho Physician Assistant     Employer: [employer]     Comment: [employer]    Occupation: LTC [employer]     Employer: [employer]     Comment: [employer]   Tobacco Use    Smoking status: Never    Smokeless tobacco: Never   Substance and Sexual Activity    Alcohol use: Yes     Comment: Occasional; 1-2 drinks per month    Drug use: No    Sexual activity: Yes     Partners: Female     Birth control/protection: Surgical     Social Determinants of Health     Financial Resource Strain: Low Risk  (3/8/2024)    Overall Financial Resource Strain (CARDIA)     Difficulty of Paying Living Expenses: Not very hard   Food Insecurity: No Food Insecurity (3/8/2024)    Hunger Vital Sign     Worried About Running Out of Food in the Last Year: Never true     Ran Out of Food in the Last Year: Never true   Transportation Needs: No Transportation Needs (3/8/2024)    PRAPARE - Transportation     Lack of Transportation (Medical): No     Lack of Transportation (Non-Medical): No   Physical Activity: Sufficiently Active (3/8/2024)    Exercise Vital Sign     Days of Exercise per Week: 5 days     Minutes of Exercise per Session: 60 min   Stress: No Stress Concern Present (3/8/2024)    [place] [employer] - Occupational Stress Questionnaire     Feeling of Stress : Only a little   Housing Stability: Low Risk  (3/8/2024)    Housing Stability Vital Sign     Unable to Pay for Housing in the Last Year: No     Number of Places Lived in the Last Year: 1     Unstable Housing in the Last Year: No       Hospitalization/Major Diagnostic  Procedure:     Review of Systems     General/Constitutional:  Chills denies. Fatigue denies. Fever denies. Weight gain denies. Weight loss denies.    Respiratory:  Shortness of breath denies.    Cardiovascular:  Chest pain denies.    Gastrointestinal:  Constipation denies. Diarrhea denies. Nausea denies. Vomiting denies.     Hematology:  Easy bruising denies. Prolonged bleeding denies.     Genitourinary:  Frequent urination denies. Pain in lower back denies. Painful urination denies.     Musculoskeletal:  See HPI for details    Skin:  Rash denies.    Neurologic:  Dizziness denies. Gait abnormalities denies. Seizures denies. Tingling/Numbess denies.    Psychiatric:  Anxiety denies. Depressed mood denies.     Objective:   Vital Signs: There were no vitals filed for this visit.     Physical Exam      General Examination:     Constitutional: The patient is alert and oriented to lace person and time. Mood is pleasant.     Head/Face: Normal facial features normal eyebrows    Eyes: Normal extraocular motion bilaterally    Lungs: Respirations are equal and unlabored    Gait is coordinated.    Cardiovascular: There are no swelling or varicosities present.    Lymphatic: Negative for adenopathy    Skin: Normal    Neurological: Level of consciousness normal. Oriented to place person and time and situation    Psychiatric: Oriented to time place person and situation    Bilateral knee exam: Bilateral knee exam is similar to where he was on his last visit about 6 months ago. Skin to his bilateral knees is clean dry and intact.  There is no erythema or ecchymosis bilaterally.  There are no signs or symptoms of infection bilaterally.  Patient is neurovascularly intact throughout bilateral lower extremities.  Bilateral calves are soft and nontender.  Bilateral knee range of motion is approximately 0-130 degrees.  Both knees are stable to varus and valgus stresses while held in extension.  Patient is diffusely tender along the medial  aspect of his bilateral knees.  He does have crepitus with range of motioning of his bilateral knees.  He is able weight bear as tolerated on his bilateral lower extremities with a mildly antalgic gait.       XRAY Report/ Interpretation :  No new radiographs taken on today's clinic visit.    Assessment:       1. Primary osteoarthritis of right knee    2. Primary osteoarthritis of left knee        Plan:       Hany was seen today for pain and pain.    Diagnoses and all orders for this visit:    Primary osteoarthritis of right knee  -     Large Joint Aspiration/Injection: R knee    Primary osteoarthritis of left knee  -     Large Joint Aspiration/Injection: L knee         Follow up if symptoms worsen or fail to improve.  This is to attest that the physician's assistant Gianfranco Cohen  served in the capacity as a scribe for this patient's encounter.  This is also to verify that I have reviewed the patient's history and helped formulate the treatment plan and discussed it with the physician's assistant.  I have actively participated in the evaluation and treatment plan for this patient.  The visit plan and medical decision-making is as outlined below    I anesthetized the anterior lateral aspect of bilateral knees today with approximately 5 cc of 1% lidocaine plain respectively.  I then injected through the anesthetized region Synvisc-One viscosupplementation to each knee.  He tolerated this well.  He will continue to follow up with us on an as-needed basis for repeat corticosteroid injections or viscosupplementation injections as needed for relief of his bilateral knee osteoarthritis.  Did have a discussion with him today about likely long term prognosis due to the degenerative changes in his knees.  He will be a lifelong treatment with conservative measures and potentially ultimately may need surgical intervention.  It is too soon to tell.  He may need some type of activity modification/limitations for some of his   duties at some point in the future.    Treatment options were discussed with regards to the nature of the medical condition. Conservative pain intervention and surgical options were discussed in detail. The probability of success of each separate treatment option was discussed. The patient expressed a clear understanding of the treatment options. With regards to surgery, the procedure risk, benefits, complications, and outcomes were discussed. No guarantees were given with regards to surgical outcome.   The risk of complications, morbidity, and mortality of patient management decisions have been made at the time of this visit. These are associated with the patient's problems, diagnostic procedures and treatment options. This includes the possible management options selected and those considered but not selected by the patient after shared medical decision making we discussed with the patient.   This note was created using Dragon voice recognition software that occasionally misinterpreted phrases or words.

## 2024-06-04 ENCOUNTER — PATIENT MESSAGE (OUTPATIENT)
Dept: FAMILY MEDICINE | Facility: CLINIC | Age: 49
End: 2024-06-04
Payer: OTHER GOVERNMENT

## 2024-06-12 DIAGNOSIS — M17.11 PRIMARY OSTEOARTHRITIS OF RIGHT KNEE: Primary | ICD-10-CM

## 2024-06-12 DIAGNOSIS — M17.12 PRIMARY OSTEOARTHRITIS OF LEFT KNEE: ICD-10-CM

## 2024-06-20 DIAGNOSIS — K21.9 GASTROESOPHAGEAL REFLUX DISEASE, UNSPECIFIED WHETHER ESOPHAGITIS PRESENT: ICD-10-CM

## 2024-06-21 RX ORDER — PANTOPRAZOLE SODIUM 40 MG/1
TABLET, DELAYED RELEASE ORAL
Qty: 60 TABLET | Refills: 11 | Status: SHIPPED | OUTPATIENT
Start: 2024-06-21

## 2024-12-03 DIAGNOSIS — N52.9 ERECTILE DYSFUNCTION, UNSPECIFIED ERECTILE DYSFUNCTION TYPE: ICD-10-CM

## 2024-12-03 RX ORDER — SILDENAFIL 100 MG/1
100 TABLET, FILM COATED ORAL
Qty: 30 TABLET | Refills: 5 | Status: SHIPPED | OUTPATIENT
Start: 2024-12-03

## 2024-12-03 NOTE — TELEPHONE ENCOUNTER
Refill Authorization Note   Hany Caba  is requesting a refill authorization.  Brief Assessment and Rationale for Refill:        Medication Therapy Plan:           Comments:     No Care Gaps recommended.     Note composed:10:58 AM 12/03/2024

## 2024-12-03 NOTE — TELEPHONE ENCOUNTER
No care due was identified.  [facility] Embedded Care Due Messages. Reference number: [id number].   12/03/2024 1:39:43 AM CST

## 2024-12-11 ENCOUNTER — OFFICE VISIT (OUTPATIENT)
Dept: ORTHOPEDICS | Facility: CLINIC | Age: 49
End: 2024-12-11
Payer: OTHER GOVERNMENT

## 2024-12-11 ENCOUNTER — HOSPITAL ENCOUNTER (OUTPATIENT)
Dept: RADIOLOGY | Facility: HOSPITAL | Age: 49
Discharge: HOME | End: 2024-12-11
Attending: ORTHOPAEDIC SURGERY
Payer: OTHER GOVERNMENT

## 2024-12-11 DIAGNOSIS — M17.12 PRIMARY OSTEOARTHRITIS OF LEFT KNEE: ICD-10-CM

## 2024-12-11 DIAGNOSIS — M17.11 PRIMARY OSTEOARTHRITIS OF RIGHT KNEE: Primary | ICD-10-CM

## 2024-12-11 PROCEDURE — 99999 PR PBB SHADOW E&M-EST. PATIENT-LVL II: CPT | Mod: PBBFAC,,, | Performed by: ORTHOPAEDIC SURGERY

## 2024-12-11 PROCEDURE — 73562 X-RAY EXAM OF KNEE 3: CPT | Mod: TC,50,PN

## 2024-12-11 PROCEDURE — 20610 DRAIN/INJ JOINT/BURSA W/O US: CPT | Mod: PBBFAC,PN | Performed by: ORTHOPAEDIC SURGERY

## 2024-12-11 PROCEDURE — 99999PBSHW PR PBB SHADOW TECHNICAL ONLY FILED TO HB: Mod: JZ,PBBFAC,,

## 2024-12-11 PROCEDURE — 99213 OFFICE O/P EST LOW 20 MIN: CPT | Mod: 25,S$PBB,, | Performed by: ORTHOPAEDIC SURGERY

## 2024-12-11 PROCEDURE — 73562 X-RAY EXAM OF KNEE 3: CPT | Mod: 26,50,, | Performed by: RADIOLOGY

## 2024-12-11 PROCEDURE — 99212 OFFICE O/P EST SF 10 MIN: CPT | Mod: PBBFAC,25,PN | Performed by: ORTHOPAEDIC SURGERY

## 2024-12-11 RX ADMIN — Medication 48 MG: at 02:12

## 2024-12-11 NOTE — PROCEDURES
Large Joint Aspiration/Injection: L knee    Date/Time: 12/11/2024 2:45 PM    Performed by: Malvin Nance MD  Authorized by: Malvin Nance MD    Consent Done?:  Yes (Verbal)  Indications:  Pain  Site marked: the procedure site was marked    Timeout: prior to procedure the correct patient, procedure, and site was verified    Prep: patient was prepped and draped in usual sterile fashion      Local anesthesia used?: Yes      Details:  Needle Size:  22 G  Ultrasonic Guidance for needle placement?: No    Approach:  Anterolateral  Location:  Knee  Site:  L knee  Medications:  48 mg hylan g-f 20 48 mg/6 mL  Patient tolerance:  Patient tolerated the procedure well with no immediate complications

## 2024-12-11 NOTE — PROGRESS NOTES
Subjective:       Patient ID: Hany Caba is a 49 y.o. male.    Chief Complaint: Pain of the Left Knee (Bilat knee injections ) and Pain of the Right Knee      History of Present Illness    Prior to meeting with the patient I reviewed the medical chart in [facility]. This included reviewing the previous progress notes from our office, review of the patient's last appointment with their primary care provider, review of any visits to the emergency room, and review of any pain management appointments or procedures.   Hany comes in today for follow-up for his bilateral knee arthrosis. He has known degenerative changes in his knees proven on previous arthroscopy and advanced imaging/MRI. He has treated this in the past with various over-the-counter NSAIDs and intra-articular injections of corticosteroids and viscosupplementation. His last viscosupplementation injections were just over 6 months ago. Comes in today for repeat injections. He denies any new injury or trauma. Does have some level of achy pain on an almost daily basis. It is worse with any weight-bearing activities. It occasionally interferes with some of his ADLs and  duties, yet he does push through. He usually suffers for several days to up to a week or more after physical activity.  He does manage this from time to time with NSAIDs as well.    Current Medications  Current Outpatient Medications   Medication Sig Dispense Refill    pantoprazole (PROTONIX) 40 MG tablet TAKE 1 TABLET TWICE A DAY AS DIRECTED 60 tablet 11    sildenafiL (VIAGRA) 100 MG tablet TAKE 1 TABLET DAILY AS NEEDED FOR ERECTILE DYSFUNCTION 30 tablet 5    verapamiL (VERELAN) 120 MG C24P Take 1 capsule (120 mg total) by mouth once daily. 90 capsule 3     No current facility-administered medications for this visit.       Allergies  Review of patient's allergies indicates:  No Known Allergies    Past Medical History  Past Medical History:   Diagnosis Date    AR (allergic rhinitis)      Bigeminy     palpitations - controlled with verapamil    GERD (gastroesophageal reflux disease)     KATELYNN (obstructive sleep apnea)     PPD positive, treated     Skin tag     Right upper eyelid    Unspecified hemorrhoids        Surgical History  Past Surgical History:   Procedure Laterality Date    APPENDECTOMY  1998    Dr. Pacheco    ARTHROSCOPY OF KNEE Left 01/2020    [facility] Med[facility]    CLOSED REDUCTION AND PERCUTANEOUS PINNING (CRPP) OF WRIST Right 09/14/2020    Procedure: CLOSED REDUCTION, WRIST, WITH PERCUTANEOUS PINNING;  Surgeon: Malvin Hernández Jr., MD;  Location: [facility] OR;  Service: Orthopedics;  Laterality: Right;    COLONOSCOPY N/A 09/02/2022    Procedure: COLONOSCOPY  with banding possible;  Surgeon: Fred Solis MD;  Location: [facility];  Service: Endoscopy;  Laterality: N/A;    ESOPHAGOGASTRODUODENOSCOPY N/A 6/2/2023    Procedure: ESOPHAGOGASTRODUODENOSCOPY (EGD);  Surgeon: Ron Nieto MD;  Location: [facility];  Service: Endoscopy;  Laterality: N/A;    FINGER TENDON REPAIR Right     Dr. Marcus, [facility] Rupture    REPAIR OF TORSION OF TESTICLE Left     Dr. Pickens, childhood    ROTATOR CUFF REPAIR Right 04/2018    Yakov Osorio / SAD with distal clavicle resection without RTC repair    ROTATOR CUFF REPAIR Left 07/2016    Yakov Osorio / RTC repair with SAD and distal clavicle resection    SEPTORHINOPLASTY  2006    Dr. Cunningham, [facility]    SEPTORHINOPLASTY  2003    [facility] /  with Cautery assisted palate stiffening    ULNAR NERVE TRANSPOSITION Right 1998    [facility]    VASECTOMY  10/2019    [facility]       Family History:   Family History   Problem Relation Name Age of Onset    Diabetes Mother      COPD Father          suden cariac event    Cancer Maternal Grandmother          breast    Macular degeneration Maternal Grandmother      Amblyopia Neg Hx      Blindness Neg Hx      Cataracts Neg Hx      Glaucoma Neg Hx      Hypertension  Neg Hx      Retinal detachment Neg Hx      Strabismus Neg Hx      Stroke Neg Hx      Thyroid disease Neg Hx         Social History:   Social History     Socioeconomic History    Marital status:      Spouse name: Lauryn    Number of children: 3    Years of education: 6    Highest education level: Master's degree (e.g., MA, MS, Rommel, MEd, MSW, MARIZA)   Occupational History    Occupation: Ortho Physician Assistant     Employer: [employer]     Comment: [employer]    Occupation: LTC [employer]     Employer: [employer]     Comment: [employer]   Tobacco Use    Smoking status: Never    Smokeless tobacco: Never   Substance and Sexual Activity    Alcohol use: Yes     Comment: Occasional; 1-2 drinks per month    Drug use: No    Sexual activity: Yes     Partners: Female     Birth control/protection: Surgical     Social Drivers of Health     Financial Resource Strain: Low Risk  (8/21/2024)    Received from [place]    Overall Financial Resource Strain (CARDIA)     Difficulty of Paying Living Expenses: Not very hard   Food Insecurity: No Food Insecurity (8/21/2024)    Received from [place]    Hunger Vital Sign     Worried About Running Out of Food in the Last Year: Never true     Ran Out of Food in the Last Year: Never true   Transportation Needs: No Transportation Needs (8/21/2024)    Received from [place]    PRAPARE - Transportation     Lack of Transportation (Medical): No     Lack of Transportation (Non-Medical): No   Physical Activity: Sufficiently Active (8/21/2024)    Received from [place]    Exercise Vital Sign     Days of Exercise per Week: 4 days     Minutes of Exercise per Session: 60 min   Stress: No Stress Concern Present (8/21/2024)    [place] [facility] [employer]    [employer] [place] - Occupational Stress Questionnaire     Feeling of Stress : Only a little   Housing Stability: Low Risk  (8/21/2024)    [place] [facility] [employer]    Housing  Stability Vital Sign     Unable to Pay for Housing in the Last Year: No     Number of Places Lived in the Last Year: 1     Unstable Housing in the Last Year: No       Hospitalization/Major Diagnostic Procedure:     Review of Systems     General/Constitutional:  Chills denies. Fatigue denies. Fever denies. Weight gain denies. Weight loss denies.    Respiratory:  Shortness of breath denies.    Cardiovascular:  Chest pain denies.    Gastrointestinal:  Constipation denies. Diarrhea denies. Nausea denies. Vomiting denies.     Hematology:  Easy bruising denies. Prolonged bleeding denies.     Genitourinary:  Frequent urination denies. Pain in lower back denies. Painful urination denies.     Musculoskeletal:  See HPI for details    Skin:  Rash denies.    Neurologic:  Dizziness denies. Gait abnormalities denies. Seizures denies. Tingling/Numbess denies.    Psychiatric:  Anxiety denies. Depressed mood denies.     Objective:   Vital Signs: There were no vitals filed for this visit.     Physical Exam      General Examination:     Constitutional: The patient is alert and oriented to lace person and time. Mood is pleasant.     Head/Face: Normal facial features normal eyebrows    Eyes: Normal extraocular motion bilaterally    Lungs: Respirations are equal and unlabored    Gait is coordinated.    Cardiovascular: There are no swelling or varicosities present.    Lymphatic: Negative for adenopathy    Skin: Normal    Neurological: Level of consciousness normal. Oriented to place person and time and situation    Psychiatric: Oriented to time place person and situation    Bilateral knee exam: Bilateral knee exam is similar to where he was on his last visit about 6 months ago. Skin to his bilateral knees is clean dry and intact.  There is no erythema or ecchymosis bilaterally.  There are no signs or symptoms of infection bilaterally.  Patient is neurovascularly intact throughout bilateral lower extremities.  Negative Homans bilaterally.   Bilateral knee range of motion is approximately 0-130 degrees.  Both knees are stable to varus and valgus stresses while held in extension.  Patient is diffusely tender along the medial aspect of his bilateral knees.  He does have crepitus with range of motioning of his bilateral knees.  He is able weight bear as tolerated on his bilateral lower extremities with a mildly antalgic gait.   Occasionally they do swell, his left is more symptomatic than the right and swells more frequently.  The left knee has been previously scoped.    XRAY Report/ Interpretation:  Three views taken of bilateral knees today: AP, lateral, and sunrise views.  They reveal no acute fractures or dislocations.  He does have degenerative change in the patellofemoral compartment bilaterally with early osteophytic changes.    Assessment:       1. Primary osteoarthritis of right knee    2. Primary osteoarthritis of left knee        Plan:       Hany was seen today for pain and pain.    Diagnoses and all orders for this visit:    Primary osteoarthritis of right knee  -     X-Ray Knee 3 View Bilateral    Primary osteoarthritis of left knee  -     X-Ray Knee 3 View Bilateral         Follow up if symptoms worsen or fail to improve.  This is to attest that the medical assistant, Gianfranco Cohen served in the capacity as a scribe for this patient's encounter.  This is also verify that I have reviewed the patient's history and  formulated the treatment plan for this patient.  I have  evaluated this patient  and formulated a treatment plan for this patient visit.  The treatment plan and medical decision-making is outlined below  I injected bilateral knees today via an anterolateral approach with Synvisc One viscosupplementation without complication.  He tolerated this well.  He will follow up with us on an as-needed basis for repeat corticosteroid injections or viscosupplementation.  Due to his known arthrosis in relatively young age, that has high  likelihood he will need surgical intervention at some point in the future, specifically total knee arthroplasty.  Hopefully we can with prolonged/delay that as long as possible.    Treatment options were discussed with regards to the nature of the medical condition. Conservative pain intervention and surgical options were discussed in detail. The probability of success of each separate treatment option was discussed. The patient expressed a clear understanding of the treatment options. With regards to surgery, the procedure risk, benefits, complications, and outcomes were discussed. No guarantees were given with regards to surgical outcome.   The risk of complications, morbidity, and mortality of patient management decisions have been made at the time of this visit. These are associated with the patient's problems, diagnostic procedures and treatment options. This includes the possible management options selected and those considered but not selected by the patient after shared medical decision making we discussed with the patient.     This note was created using Dragon voice recognition software that occasionally misinterpreted phrases or words.

## 2024-12-11 NOTE — PROCEDURES
Addended by: AASHISH MORA on: 8/4/2021 08:27 AM     Modules accepted: Orders     Large Joint Aspiration/Injection: R knee    Date/Time: 12/11/2024 2:45 PM    Performed by: Malvin Nance MD  Authorized by: Malvin Nance MD    Consent Done?:  Yes (Verbal)  Indications:  Pain  Site marked: the procedure site was marked    Timeout: prior to procedure the correct patient, procedure, and site was verified    Prep: patient was prepped and draped in usual sterile fashion      Local anesthesia used?: Yes      Details:  Needle Size:  22 G  Ultrasonic Guidance for needle placement?: No    Approach:  Anterolateral  Location:  Knee  Site:  R knee  Medications:  48 mg hylan g-f 20 48 mg/6 mL  Patient tolerance:  Patient tolerated the procedure well with no immediate complications

## 2024-12-24 ENCOUNTER — OFFICE VISIT (OUTPATIENT)
Dept: OPTOMETRY | Facility: CLINIC | Age: 49
End: 2024-12-24
Payer: OTHER GOVERNMENT

## 2024-12-24 DIAGNOSIS — D31.31 CHOROIDAL NEVUS, RIGHT: ICD-10-CM

## 2024-12-24 DIAGNOSIS — H52.13 MYOPIA WITH ASTIGMATISM AND PRESBYOPIA, BILATERAL: ICD-10-CM

## 2024-12-24 DIAGNOSIS — Z13.5 GLAUCOMA SCREENING: ICD-10-CM

## 2024-12-24 DIAGNOSIS — H52.4 MYOPIA WITH ASTIGMATISM AND PRESBYOPIA, BILATERAL: ICD-10-CM

## 2024-12-24 DIAGNOSIS — Z01.00 EXAMINATION OF EYES AND VISION: Primary | ICD-10-CM

## 2024-12-24 DIAGNOSIS — H52.203 MYOPIA WITH ASTIGMATISM AND PRESBYOPIA, BILATERAL: ICD-10-CM

## 2024-12-24 DIAGNOSIS — H43.393 VITREOUS FLOATERS, BILATERAL: ICD-10-CM

## 2024-12-24 PROCEDURE — 92015 DETERMINE REFRACTIVE STATE: CPT | Mod: ,,, | Performed by: OPTOMETRIST

## 2024-12-24 PROCEDURE — 99999 PR PBB SHADOW E&M-EST. PATIENT-LVL III: CPT | Mod: PBBFAC,,, | Performed by: OPTOMETRIST

## 2024-12-24 PROCEDURE — 92014 COMPRE OPH EXAM EST PT 1/>: CPT | Mod: S$PBB,,, | Performed by: OPTOMETRIST

## 2024-12-24 PROCEDURE — 99213 OFFICE O/P EST LOW 20 MIN: CPT | Mod: PBBFAC,PO | Performed by: OPTOMETRIST

## 2024-12-24 PROCEDURE — 92250 FUNDUS PHOTOGRAPHY W/I&R: CPT | Mod: PBBFAC,PO | Performed by: OPTOMETRIST

## 2024-12-24 NOTE — PROGRESS NOTES
HPI    Pt presents today for a dfe.  Pt states no changes in va.  Pt wears  gls only.  No gtts.  Denies ocular pain/disc.  Denies f/f.      Last edited by Silvia Fermin on 2024  9:01 AM.            Assessment /Plan     For exam results, see Encounter Report.    Examination of eyes and vision    Vitreous floaters, bilateral    Choroidal nevus, right  -     Color Fundus Photography - OU - Both Eyes    Glaucoma screening    Myopia with astigmatism and presbyopia, bilateral      Ocular health exam OU   RD precautions given and reviewed. Patient knows to call/ message if any further changes in symptoms occur.  OPTOS 2024 baseline   Longstanding -- Large, flat pigmented area sup / nasal OD     4.   Not suspect   5.   Updated specs rx gave copy, discussed options // fill prn --ok continue with current     Discussed and educated patient on current findings /plan.  RTC 1 year w/ OPTOS, prn if any changes / issues

## 2025-03-14 ENCOUNTER — OFFICE VISIT (OUTPATIENT)
Dept: FAMILY MEDICINE | Facility: CLINIC | Age: 50
End: 2025-03-14
Payer: OTHER GOVERNMENT

## 2025-03-14 VITALS
DIASTOLIC BLOOD PRESSURE: 70 MMHG | BODY MASS INDEX: 27.43 KG/M2 | OXYGEN SATURATION: 99 % | HEART RATE: 66 BPM | HEIGHT: 73 IN | WEIGHT: 207 LBS | SYSTOLIC BLOOD PRESSURE: 120 MMHG

## 2025-03-14 DIAGNOSIS — N52.9 ERECTILE DYSFUNCTION, UNSPECIFIED ERECTILE DYSFUNCTION TYPE: ICD-10-CM

## 2025-03-14 DIAGNOSIS — Z00.00 PREVENTATIVE HEALTH CARE: Primary | ICD-10-CM

## 2025-03-14 DIAGNOSIS — S86.811A STRAIN OF CALF MUSCLE, RIGHT, INITIAL ENCOUNTER: ICD-10-CM

## 2025-03-14 PROCEDURE — 99396 PREV VISIT EST AGE 40-64: CPT | Mod: S$PBB,,, | Performed by: FAMILY MEDICINE

## 2025-03-14 PROCEDURE — 99213 OFFICE O/P EST LOW 20 MIN: CPT | Mod: PBBFAC,PO | Performed by: FAMILY MEDICINE

## 2025-03-14 PROCEDURE — 99999 PR PBB SHADOW E&M-EST. PATIENT-LVL III: CPT | Mod: PBBFAC,,, | Performed by: FAMILY MEDICINE

## 2025-03-14 RX ORDER — TADALAFIL 20 MG/1
20 TABLET ORAL
Qty: 20 TABLET | Refills: 11 | Status: SHIPPED | OUTPATIENT
Start: 2025-03-14 | End: 2026-03-14

## 2025-03-14 NOTE — PROGRESS NOTES
Subjective:       Patient ID: Hany Caba is a 49 y.o. male.    Chief Complaint: Follow-up (Annual Follow up)    Here for annual exam    Bigemy - stable taking Verapamil daily  GERD - taking protonix daily    Exercising most days with running. (20 miles per week)    Injured right calf while skiing last week. He felt a pop.  It became swollen and bruised.            Past Medical History:   Diagnosis Date    AR (allergic rhinitis)     Bigeminy     palpitations - controlled with verapamil    GERD (gastroesophageal reflux disease)     KATELYNN (obstructive sleep apnea)     PPD positive, treated     Skin tag     Right upper eyelid    Unspecified hemorrhoids        Past Surgical History:   Procedure Laterality Date    APPENDECTOMY  1998    Dr. Pacheco    ARTHROSCOPY OF KNEE Left 01/2020    [facility]. [facility]    CLOSED REDUCTION AND PERCUTANEOUS PINNING (CRPP) OF WRIST Right 09/14/2020    Procedure: CLOSED REDUCTION, WRIST, WITH PERCUTANEOUS PINNING;  Surgeon: Malvin Hernández Jr., MD;  Location: [facility];  Service: Orthopedics;  Laterality: Right;    COLONOSCOPY N/A 09/02/2022    Procedure: COLONOSCOPY  with banding possible;  Surgeon: Fred Solis MD;  Location: [facility];  Service: Endoscopy;  Laterality: N/A;    ESOPHAGOGASTRODUODENOSCOPY N/A 6/2/2023    Procedure: ESOPHAGOGASTRODUODENOSCOPY (EGD);  Surgeon: Ron Nieto MD;  Location: [facility];  Service: Endoscopy;  Laterality: N/A;    FINGER TENDON REPAIR Right     Dr. Marcus, [facility] Rupture    REPAIR OF TORSION OF TESTICLE Left     Dr. Pickens, [facility]    ROTATOR CUFF REPAIR Right 04/2018    Yakov Osorio / SAD with distal clavicle resection without RTC repair    ROTATOR CUFF REPAIR Left 07/2016    Yakov Osorio / RTC repair with SAD and distal clavicle resection    SEPTORHINOPLASTY  2006    Dr. Cunningham, [facility]    SEPTORHINOPLASTY  2003    Efrain Morales /  with Cautery assisted palate stiffening     ULNAR [facility]ERV[facility] TRANSPOS[facility] Right [facility]    VASECTOMY  10/2019    [facility]       Review of patient's allergies indicates:  No Known Allergies    Social History     Socioeconomic History    Marital status:      Spouse name: Lauryn    Number of children: 3    Years of education: 6    Highest education level: Master's degree (e.g., MA, MS, Rommel, MEd, MSW, MARIZA)   Occupational History    Occupation: Ortho Physician Assistant     Employer: [employer]     Comment: [employer]    Occupation: LTC [employer]     Employer: [employer]     Comment: [employer]   Tobacco Use    Smoking status: Never    Smokeless tobacco: Never   Substance and Sexual Activity    Alcohol use: Yes     Comment: Occasional; 1-2 drinks per month    Drug use: No    Sexual activity: Yes     Partners: Female     Birth control/protection: Surgical     Social Drivers of Health     Financial Resource Strain: Low Risk  (8/21/2024)    Received from [facility]    Overall Financial Resource Strain (CARDIA)     Difficulty of Paying Living Expenses: Not very hard   Food Insecurity: No Food Insecurity (8/21/2024)    Received from [facility]    Hunger Vital Sign     Worried About Running Out of Food in the Last Year: Never true     Ran Out of Food in the Last Year: Never true   Transportation Needs: No Transportation Needs (8/21/2024)    Received from [facility]    PRAPARE - Transportation     Lack of Transportation (Medical): No     Lack of Transportation (Non-Medical): No   Physical Activity: Sufficiently Active (8/21/2024)    Received from [facility]    Exercise Vital Sign     Days of Exercise per Week: 4 days     Minutes of Exercise per Session: 60 min   Stress: No Stress Concern Present (8/21/2024)    Received from [facility]    [place] [employer] - Occupational Stress Questionnaire     Feeling of Stress : Only a little   Housing Stability: Low Risk  (8/21/2024)    [facility] from  [facility]    Housing Stability Vital Sign     Unable to Pay for Housing in the Last Year: No     Number of Places Lived in the Last Year: 1     Unstable Housing in the Last Year: No       Current Outpatient Medications on File Prior to Visit   Medication Sig Dispense Refill    pantoprazole (PROTONIX) 40 MG tablet TAKE 1 TABLET TWICE A DAY AS DIRECTED 60 tablet 11    verapamiL (VERELAN) 120 MG C24P Take 1 capsule (120 mg total) by mouth once daily. 90 capsule 3     No current facility-administered medications on file prior to visit.       Family History   Problem Relation Name Age of Onset    Diabetes Mother      COPD Father          adair pitt event    Cancer Maternal Grandmother          breast    Macular degeneration Maternal Grandmother      Amblyopia Neg Hx      Blindness Neg Hx      Cataracts Neg Hx      Glaucoma Neg Hx      Hypertension Neg Hx      Retinal detachment Neg Hx      Strabismus Neg Hx      Stroke Neg Hx      Thyroid disease Neg Hx         Review of Systems   Constitutional:  Negative for appetite change, chills, fever and unexpected weight change.   HENT:  Negative for sore throat and trouble swallowing.    Eyes:  Negative for pain and visual disturbance.   Respiratory:  Negative for cough, chest tightness, shortness of breath and wheezing.    Cardiovascular:  Negative for chest pain, palpitations and leg swelling.   Gastrointestinal:  Negative for abdominal pain, blood in stool, constipation, diarrhea and nausea.   Genitourinary:  Negative for difficulty urinating, dysuria and hematuria.   Musculoskeletal:  Positive for myalgias. Negative for arthralgias, gait problem and neck pain.   Skin:  Negative for rash and wound.   Neurological:  Negative for dizziness, weakness, light-headedness and headaches.   Hematological:  Negative for adenopathy.   Psychiatric/Behavioral:  Negative for dysphoric mood.        Objective:      /70   Pulse 66   Ht 6' 1" (1.854 m)   Wt 93.9 kg (207 lb 0.2 oz) "   SpO2 99%   BMI 27.31 kg/m²   Physical Exam  Constitutional:       General: He is not in acute distress.     Appearance: He is well-developed.   HENT:      Head: Normocephalic and atraumatic.      Right Ear: External ear normal.      Left Ear: External ear normal.      Mouth/Throat:      Pharynx: Uvula midline. No oropharyngeal exudate.   Eyes:      General: Lids are normal.      Conjunctiva/sclera: Conjunctivae normal.      Pupils: Pupils are equal, round, and reactive to light.   Neck:      Thyroid: No thyroid mass or thyromegaly.      Trachea: Phonation normal.   Cardiovascular:      Rate and Rhythm: Normal rate and regular rhythm.      Heart sounds: Normal heart sounds. No murmur heard.     No friction rub. No gallop.   Pulmonary:      Effort: Pulmonary effort is normal. No respiratory distress.      Breath sounds: Normal breath sounds. No wheezing or rales.   Abdominal:      General: Abdomen is flat. Bowel sounds are normal.      Palpations: Abdomen is soft.   Musculoskeletal:         General: Normal range of motion.      Cervical back: Full passive range of motion without pain, normal range of motion and neck supple.        Legs:    Lymphadenopathy:      Cervical: No cervical adenopathy.   Skin:     General: Skin is warm and dry.   Neurological:      Mental Status: He is alert and oriented to person, place, and time.      Cranial Nerves: No cranial nerve deficit.      Coordination: Coordination normal.   Psychiatric:         Speech: Speech normal.         Behavior: Behavior normal.         Thought Content: Thought content normal.         Judgment: Judgment normal.         Assessment:       1. Preventative health care    2. Erectile dysfunction, unspecified erectile dysfunction type    3. Strain of calf muscle, right, initial encounter            Plan:       Preventative health care  -     PSA, Screening; Future; Expected date: 03/14/2025  -     Lipid Panel; Future; Expected date: 03/14/2025  -      Comprehensive Metabolic Panel; Future; Expected date: 03/14/2025  -     CBC Auto Differential; Future; Expected date: 03/14/2025  -     Testosterone; Future; Expected date: 03/14/2025    Erectile dysfunction, unspecified erectile dysfunction type  -     tadalafiL (CIALIS) 20 MG Tab; Take 1 tablet (20 mg total) by mouth every 36 (thirty-six) hours.  Dispense: 20 tablet; Refill: 11    Strain of calf muscle, right, initial encounter          Overall healthy  Labs soon  Reassurance regarding calf injury; basic stretching and relative rest; ortho f/u PRN  Counseled on regular exercise, maintenance of a healthy weight, balanced diet rich in fruits/vegetables and lean protein, and avoidance of unhealthy habits like smoking and excessive alcohol intake.

## 2025-03-15 ENCOUNTER — LAB VISIT (OUTPATIENT)
Dept: LAB | Facility: HOSPITAL | Age: 50
End: 2025-03-15
Attending: FAMILY MEDICINE
Payer: OTHER GOVERNMENT

## 2025-03-15 DIAGNOSIS — Z00.00 PREVENTATIVE HEALTH CARE: ICD-10-CM

## 2025-03-15 LAB
ALBUMIN SERPL BCP-MCNC: 3.9 G/DL (ref 3.5–5.2)
ALP SERPL-CCNC: 86 U/L (ref 40–150)
ALT SERPL W/O P-5'-P-CCNC: 30 U/L (ref 10–44)
ANION GAP SERPL CALC-SCNC: 9 MMOL/L (ref 8–16)
AST SERPL-CCNC: 25 U/L (ref 10–40)
BASOPHILS # BLD AUTO: 0.06 K/UL (ref 0–0.2)
BASOPHILS NFR BLD: 1 % (ref 0–1.9)
BILIRUB SERPL-MCNC: 0.4 MG/DL (ref 0.1–1)
BUN SERPL-MCNC: 15 MG/DL (ref 6–20)
CALCIUM SERPL-MCNC: 8.7 MG/DL (ref 8.7–10.5)
CHLORIDE SERPL-SCNC: 108 MMOL/L (ref 95–110)
CHOLEST SERPL-MCNC: 132 MG/DL (ref 120–199)
CHOLEST/HDLC SERPL: 3 {RATIO} (ref 2–5)
CO2 SERPL-SCNC: 23 MMOL/L (ref 23–29)
COMPLEXED PSA SERPL-MCNC: 0.75 NG/ML (ref 0–4)
CREAT SERPL-MCNC: 1 MG/DL (ref 0.5–1.4)
DIFFERENTIAL METHOD BLD: ABNORMAL
EOSINOPHIL # BLD AUTO: 0.3 K/UL (ref 0–0.5)
EOSINOPHIL NFR BLD: 4.1 % (ref 0–8)
ERYTHROCYTE [DISTWIDTH] IN BLOOD BY AUTOMATED COUNT: 12.6 % (ref 11.5–14.5)
EST. GFR  (NO RACE VARIABLE): >60 ML/MIN/1.73 M^2
GLUCOSE SERPL-MCNC: 99 MG/DL (ref 70–110)
HCT VFR BLD AUTO: 45.2 % (ref 40–54)
HDLC SERPL-MCNC: 44 MG/DL (ref 40–75)
HDLC SERPL: 33.3 % (ref 20–50)
HGB BLD-MCNC: 15.1 G/DL (ref 14–18)
IMM GRANULOCYTES # BLD AUTO: 0.03 K/UL (ref 0–0.04)
IMM GRANULOCYTES NFR BLD AUTO: 0.5 % (ref 0–0.5)
LDLC SERPL CALC-MCNC: 58.2 MG/DL (ref 63–159)
LYMPHOCYTES # BLD AUTO: 1.5 K/UL (ref 1–4.8)
LYMPHOCYTES NFR BLD: 24.9 % (ref 18–48)
MCH RBC QN AUTO: 29.4 PG (ref 27–31)
MCHC RBC AUTO-ENTMCNC: 33.4 G/DL (ref 32–36)
MCV RBC AUTO: 88 FL (ref 82–98)
MONOCYTES # BLD AUTO: 0.6 K/UL (ref 0.3–1)
MONOCYTES NFR BLD: 10.4 % (ref 4–15)
NEUTROPHILS # BLD AUTO: 3.6 K/UL (ref 1.8–7.7)
NEUTROPHILS NFR BLD: 59.1 % (ref 38–73)
NONHDLC SERPL-MCNC: 88 MG/DL
NRBC BLD-RTO: 0 /100 WBC
PLATELET # BLD AUTO: 289 K/UL (ref 150–450)
PMV BLD AUTO: 9.1 FL (ref 9.2–12.9)
POTASSIUM SERPL-SCNC: 4.1 MMOL/L (ref 3.5–5.1)
PROT SERPL-MCNC: 6.8 G/DL (ref 6–8.4)
RBC # BLD AUTO: 5.14 M/UL (ref 4.6–6.2)
SODIUM SERPL-SCNC: 140 MMOL/L (ref 136–145)
TESTOST SERPL-MCNC: 452 NG/DL (ref 304–1227)
TRIGL SERPL-MCNC: 149 MG/DL (ref 30–150)
WBC # BLD AUTO: 6.03 K/UL (ref 3.9–12.7)

## 2025-03-15 PROCEDURE — 84153 ASSAY OF PSA TOTAL: CPT | Performed by: FAMILY MEDICINE

## 2025-03-15 PROCEDURE — 84403 ASSAY OF TOTAL TESTOSTERONE: CPT | Performed by: FAMILY MEDICINE

## 2025-03-15 PROCEDURE — 36415 COLL VENOUS BLD VENIPUNCTURE: CPT | Mod: PO | Performed by: FAMILY MEDICINE

## 2025-03-15 PROCEDURE — 85025 COMPLETE CBC W/AUTO DIFF WBC: CPT | Performed by: FAMILY MEDICINE

## 2025-03-15 PROCEDURE — 80061 LIPID PANEL: CPT | Performed by: FAMILY MEDICINE

## 2025-03-15 PROCEDURE — 80053 COMPREHEN METABOLIC PANEL: CPT | Performed by: FAMILY MEDICINE

## 2025-03-31 ENCOUNTER — RESULTS FOLLOW-UP (OUTPATIENT)
Dept: FAMILY MEDICINE | Facility: CLINIC | Age: 50
End: 2025-03-31

## 2025-06-16 ENCOUNTER — OFFICE VISIT (OUTPATIENT)
Dept: ORTHOPEDICS | Facility: CLINIC | Age: 50
End: 2025-06-16
Payer: OTHER GOVERNMENT

## 2025-06-16 DIAGNOSIS — M17.11 PRIMARY OSTEOARTHRITIS OF RIGHT KNEE: Primary | ICD-10-CM

## 2025-06-16 DIAGNOSIS — M17.12 PRIMARY OSTEOARTHRITIS OF LEFT KNEE: ICD-10-CM

## 2025-06-16 PROCEDURE — 99999 PR PBB SHADOW E&M-EST. PATIENT-LVL II: CPT | Mod: PBBFAC,,, | Performed by: ORTHOPAEDIC SURGERY

## 2025-06-16 PROCEDURE — 99999PBSHW PR PBB SHADOW TECHNICAL ONLY FILED TO HB: Mod: JZ,PBBFAC,,

## 2025-06-16 PROCEDURE — 99212 OFFICE O/P EST SF 10 MIN: CPT | Mod: PBBFAC,PN | Performed by: ORTHOPAEDIC SURGERY

## 2025-06-16 PROCEDURE — 20610 DRAIN/INJ JOINT/BURSA W/O US: CPT | Mod: PBBFAC,PN,LT | Performed by: ORTHOPAEDIC SURGERY

## 2025-06-16 PROCEDURE — 20610 DRAIN/INJ JOINT/BURSA W/O US: CPT | Mod: PBBFAC,PN,RT | Performed by: ORTHOPAEDIC SURGERY

## 2025-06-16 PROCEDURE — 99213 OFFICE O/P EST LOW 20 MIN: CPT | Mod: S$PBB,25,, | Performed by: ORTHOPAEDIC SURGERY

## 2025-06-16 RX ADMIN — Medication 48 MG: at 02:06

## 2025-06-16 NOTE — PROCEDURES
Large Joint Aspiration/Injection: R knee    Date/Time: 6/16/2025 2:30 PM    Performed by: Malvin Nance MD  Authorized by: Malvin Nance MD    Consent Done?:  Yes (Verbal)  Indications:  Pain  Site marked: the procedure site was marked    Timeout: prior to procedure the correct patient, procedure, and site was verified    Prep: patient was prepped and draped in usual sterile fashion      Details:  Needle Size:  22 G  Ultrasonic Guidance for needle placement?: No    Location:  Knee  Site:  R knee  Medications:  48 mg hylan g-f 20 48 mg/6 mL  Patient tolerance:  Patient tolerated the procedure well with no immediate complications  Large Joint Aspiration/Injection: L knee    Date/Time: 6/16/2025 2:30 PM    Performed by: Malvin Nance MD  Authorized by: Malvin Nance MD    Consent Done?:  Yes (Verbal)  Indications:  Pain  Site marked: the procedure site was marked    Timeout: prior to procedure the correct patient, procedure, and site was verified    Prep: patient was prepped and draped in usual sterile fashion      Details:  Needle Size:  22 G  Ultrasonic Guidance for needle placement?: No    Location:  Knee  Site:  L knee  Medications:  48 mg hylan g-f 20 48 mg/6 mL  Patient tolerance:  Patient tolerated the procedure well with no immediate complications

## 2025-06-16 NOTE — PROGRESS NOTES
Subjective:       Patient ID: Hany Caba is a 49 y.o. male.    Chief Complaint: Pain of the Left Knee (Here for bilat knee synvisc one injection) and Pain of the Right Knee      History of Present Illness    Prior to meeting with the patient I reviewed the medical chart in [facility]. This included reviewing the previous progress notes from our office, review of the patient's last appointment with their primary care provider, review of any visits to the emergency room, and review of any pain management appointments or procedures.   History of degenerative arthritis both knees with periodic Synvisc injections afforded him some palliative treatment of his symptoms patient here to repeat injection    Current Medications  Current Medications[1]    Allergies  Review of patient's allergies indicates:  No Known Allergies    Past Medical History  Past Medical History:   Diagnosis Date    AR (allergic rhinitis)     Bigeminy     palpitations - controlled with verapamil    GERD (gastroesophageal reflux disease)     KATELYNN (obstructive sleep apnea)     PPD positive, treated     Skin tag     Right upper eyelid    Unspecified hemorrhoids        Surgical History  Past Surgical History:   Procedure Laterality Date    APPENDECTOMY  1998    Dr. Pacheco    ARTHROSCOPY OF KNEE Left 01/2020    [facility]. [facility] Medial [facility]is[facility]    CLOSED REDUCTION AND PERCUTANEOUS PINNING (CRPP) OF WRIST Right 09/14/2020    Procedure: CLOSED REDUCTION, WRIST, WITH PERCUTANEOUS PINNING;  Surgeon: Malvin Hernández Jr., MD;  [facility]  Service: Orthopedics;  Laterality: Right;    COLONOSCOPY N/A 09/02/2022    Procedure: COLONOSCOPY  with banding possible;  Surgeon: Fred Solis MD;  Location: [facility];  Service: Endoscopy;  Laterality: N/A;    ESOPHAGOGASTRODUODENOSCOPY N/A 6/2/2023    Procedure: ESOPHAGOGASTRODUODENOSCOPY (EGD);  Surgeon: Ron Nieto MD;  Location: [facility];  Service: Endoscopy;  Laterality: N/A;    FINGER  TENDON REPAIR Right     Dr. Marcus, [facility] Rupture    REPAIR OF TORSION OF TESTICLE Left     Dr. Pickens, [facility]    ROTATOR CUFF REPAIR Right 04/2018    Yakov Osorio / SAD with distal clavicle resection without RTC repair    ROTATOR CUFF REPAIR Left 07/2016    Yakov Osorio / RTC repair with SAD and distal clavicle resection    SEPTORHINOPLASTY  2006    Dr. Cunningham, [facility]    SEPTORHINOPLASTY  2003    Dr. Snow, [facility] /  with Cautery assisted palate stiffening    ULNAR NERVE TRANSPOSITION Right 1998    Dr. Nunn, [facility]    VASECTOMY  10/2019    [facility]       Family History:   Family History   Problem Relation Name Age of Onset    Diabetes Mother      COPD Father          suden cariac event    Cancer Maternal Grandmother          breast    Macular degeneration Maternal Grandmother      Amblyopia Neg Hx      Blindness Neg Hx      Cataracts Neg Hx      Glaucoma Neg Hx      Hypertension Neg Hx      Retinal detachment Neg Hx      Strabismus Neg Hx      Stroke Neg Hx      Thyroid disease Neg Hx         Social History:   Social History[2]    Hospitalization/Major Diagnostic Procedure:     Review of Systems     General/Constitutional:  Chills denies. Fatigue denies. Fever denies. Weight gain denies. Weight loss denies.    Respiratory:  Shortness of breath denies.    Cardiovascular:  Chest pain denies.    Gastrointestinal:  Constipation denies. Diarrhea denies. Nausea denies. Vomiting denies.     Hematology:  Easy bruising denies. Prolonged bleeding denies.     Genitourinary:  Frequent urination denies. Pain in lower back denies. Painful urination denies.     Musculoskeletal:  See HPI for details    Skin:  Rash denies.    Neurologic:  Dizziness denies. Gait abnormalities denies. Seizures denies. Tingling/Numbess denies.    Psychiatric:  Anxiety denies. Depressed mood denies.     Objective:   Vital Signs: There were no vitals filed for this visit.     Physical Exam      General Examination:      Constitutional: The patient is alert and oriented to lace person and time. Mood is pleasant.     Head/Face: Normal facial features normal eyebrows    Eyes: Normal extraocular motion bilaterally    Lungs: Respirations are equal and unlabored    Gait is coordinated.    Cardiovascular: There are no swelling or varicosities present.    Lymphatic: Negative for adenopathy    Skin: Normal    Neurological: Level of consciousness normal. Oriented to place person and time and situation    Psychiatric: Oriented to time place person and situation    Patellofemoral crepitus noted and pain with varus valgus stress testing  XRAY Report/ Interpretation:  Prior diagnostic studies were reviewed      Assessment:       1. Primary osteoarthritis of right knee    2. Primary osteoarthritis of left knee        Plan:       Hany was seen today for pain and pain.    Diagnoses and all orders for this visit:    Primary osteoarthritis of right knee  -     Large Joint Aspiration/Injection: R knee    Primary osteoarthritis of left knee  -     Large Joint Aspiration/Injection: L knee         Follow up if symptoms worsen or fail to improve.    The patient elected to proceed with viscosupplementation injections under sterile conditions Synvisc was injected intra-articular left and right knees without any side effects    Return 6 months or as needed  Treatment options were discussed with regards to the nature of the medical condition. Conservative pain intervention and surgical options were discussed in detail. The probability of success of each separate treatment option was discussed. The patient expressed a clear understanding of the treatment options. With regards to surgery, the procedure risk, benefits, complications, and outcomes were discussed. No guarantees were given with regards to surgical outcome.   The risk of complications, morbidity, and mortality of patient management decisions have been made at the time of this visit. These are  associated with the patient's problems, diagnostic procedures and treatment options. This includes the possible management options selected and those considered but not selected by the patient after shared medical decision making we discussed with the patient.     This note was created using Dragon voice recognition software that occasionally misinterpreted phrases or words.         [1]   Current Outpatient Medications   Medication Sig Dispense Refill    pantoprazole (PROTONIX) 40 MG tablet TAKE 1 TABLET TWICE A DAY AS DIRECTED 60 tablet 11    tadalafiL (CIALIS) 20 MG Tab Take 1 tablet (20 mg total) by mouth every 36 (thirty-six) hours. 20 tablet 11    verapamiL (VERELAN) 120 MG C24P TAKE 1 CAPSULE DAILY 90 capsule 3     No current facility-administered medications for this visit.   [2]   Social History  Socioeconomic History    Marital status:      Spouse name: Lauryn    Number of children: 3    Years of education: 6    Highest education level: Master's degree (e.g., MA, MS, Rommel, MEd, MSW, MARIZA)   Occupational History    Occupation: Ortho Physician Assistant     Employer: [employer]     Comment: [employer]    Occupation: LTC [employer]     Employer: [employer]     Comment: [employer]   Tobacco Use    Smoking status: Never    Smokeless tobacco: Never   Substance and Sexual Activity    Alcohol use: Yes     Comment: Occasional; 1-2 drinks per month    Drug use: No    Sexual activity: Yes     Partners: Female     Birth control/protection: Surgical     Social Drivers of Health     Financial Resource Strain: Low Risk  (8/21/2024)    Received from [facility]    Overall Financial Resource Strain (CARDIA)     Difficulty of Paying Living Expenses: Not very hard   Food Insecurity: No Food Insecurity (8/21/2024)    Received from [facility]    Hunger Vital Sign     Worried About Running Out of Food in the Last Year: Never true     Ran Out of Food in the Last Year: Never true    Transportation Needs: No Transportation Needs (8/21/2024)    Received from [facility]    PRAPARE - Transportation     Lack of Transportation (Medical): No     Lack of Transportation (Non-Medical): No   Physical Activity: Sufficiently Active (8/21/2024)    Received from [facility]    Exercise Vital Sign     Days of Exercise per Week: 4 days     Minutes of Exercise per Session: 60 min   Stress: No Stress Concern Present (8/21/2024)    Received from [facility]    [place] [employer] - Occupational Stress Questionnaire     Feeling of Stress : Only a little   Housing Stability: Low Risk  (8/21/2024)    Received from [facility]    Housing Stability Vital Sign     Unable to Pay for Housing in the Last Year: No     Number of Places Lived in the Last Year: 1     Unstable Housing in the Last Year: No

## 2025-08-06 ENCOUNTER — PATIENT MESSAGE (OUTPATIENT)
Dept: FAMILY MEDICINE | Facility: CLINIC | Age: 50
End: 2025-08-06
Payer: OTHER GOVERNMENT

## 2025-08-06 DIAGNOSIS — L57.0 SK (SOLAR KERATOSIS): ICD-10-CM

## 2025-08-06 DIAGNOSIS — Z12.83 SKIN EXAM FOR MALIGNANT NEOPLASM: Primary | ICD-10-CM

## 2025-08-25 ENCOUNTER — PATIENT MESSAGE (OUTPATIENT)
Dept: OTOLARYNGOLOGY | Facility: CLINIC | Age: 50
End: 2025-08-25
Payer: OTHER GOVERNMENT

## (undated) DEVICE — SEE MEDLINE ITEM 157131

## (undated) DEVICE — TOURNIQUET SB QC DP 18X4IN

## (undated) DEVICE — SCRUB 10% POVIDONE IODINE 4OZ

## (undated) DEVICE — DRAPE C ARM 42 X 120 10/BX

## (undated) DEVICE — SEE MEDLINE ITEM 157117

## (undated) DEVICE — GLOVE SURG ULTRA TOUCH 7

## (undated) DEVICE — SEE MEDLINE ITEM 157116

## (undated) DEVICE — COVER SURG LIGHT HANDLE

## (undated) DEVICE — SPONGE GAUZE 16PLY 4X4

## (undated) DEVICE — ELECTRODE REM PLYHSV RETURN 9

## (undated) DEVICE — GLOVE SURG ULTRA TOUCH 8

## (undated) DEVICE — SEE MEDLINE ITEM 152487

## (undated) DEVICE — STRAP OR TABLE 5IN X 72IN

## (undated) DEVICE — SLING ORTHOPEDIC LARGE

## (undated) DEVICE — SPONGE SUPER KERLIX 6X6.75IN

## (undated) DEVICE — PADDING CAST 4IN SPECIALIST

## (undated) DEVICE — PACK BASIC

## (undated) DEVICE — UNDERGLOVES BIOGEL PI SZ 7 LF

## (undated) DEVICE — DRAPE PLASTIC U 60X72

## (undated) DEVICE — SKINMARKER W/RULER DEVON

## (undated) DEVICE — GLOVE SURG ULTRA TOUCH 8.5

## (undated) DEVICE — SEE MEDLINE ITEM 146270

## (undated) DEVICE — TRAY DRY SPONGE SCRUB W FOAM

## (undated) DEVICE — SCRUB HIBICLENS 4% CHG 4OZ

## (undated) DEVICE — DRESSING N ADH OIL EMUL 3X3

## (undated) DEVICE — BRUSH SCRUB HIBICLENS 4%

## (undated) DEVICE — PAD CAST SPECIALIST STRL 4

## (undated) DEVICE — SEE MEDLINE ITEM 157171

## (undated) DEVICE — BLADE SURG #15 CARBON STEEL

## (undated) DEVICE — SEE MEDLINE ITEM 146308

## (undated) DEVICE — ALCOHOL 70% ISOP RUBBING 4OZ